# Patient Record
Sex: FEMALE | Race: WHITE | NOT HISPANIC OR LATINO | Employment: OTHER | URBAN - METROPOLITAN AREA
[De-identification: names, ages, dates, MRNs, and addresses within clinical notes are randomized per-mention and may not be internally consistent; named-entity substitution may affect disease eponyms.]

---

## 2017-01-23 RX ORDER — GLIMEPIRIDE 4 MG/1
TABLET ORAL
Qty: 90 TABLET | Refills: 0 | Status: SHIPPED | OUTPATIENT
Start: 2017-01-23 | End: 2017-04-24 | Stop reason: SDUPTHER

## 2017-01-23 RX ORDER — POTASSIUM CHLORIDE 750 MG/1
TABLET, FILM COATED, EXTENDED RELEASE ORAL
Qty: 90 TABLET | Refills: 0 | Status: SHIPPED | OUTPATIENT
Start: 2017-01-23 | End: 2018-01-16 | Stop reason: SDUPTHER

## 2017-02-07 ENCOUNTER — OFFICE VISIT (OUTPATIENT)
Dept: ENDOCRINOLOGY | Facility: CLINIC | Age: 72
End: 2017-02-07

## 2017-02-07 VITALS
BODY MASS INDEX: 29.25 KG/M2 | OXYGEN SATURATION: 98 % | WEIGHT: 182 LBS | HEART RATE: 67 BPM | SYSTOLIC BLOOD PRESSURE: 124 MMHG | DIASTOLIC BLOOD PRESSURE: 68 MMHG | HEIGHT: 66 IN

## 2017-02-07 DIAGNOSIS — E78.00 PURE HYPERCHOLESTEROLEMIA: ICD-10-CM

## 2017-02-07 DIAGNOSIS — Z79.4 TYPE 2 DIABETES MELLITUS WITHOUT COMPLICATION, WITH LONG-TERM CURRENT USE OF INSULIN (HCC): Primary | ICD-10-CM

## 2017-02-07 DIAGNOSIS — R79.89 ABNORMAL LIVER FUNCTION TESTS: ICD-10-CM

## 2017-02-07 DIAGNOSIS — E55.9 VITAMIN D DEFICIENCY: ICD-10-CM

## 2017-02-07 DIAGNOSIS — E11.9 TYPE 2 DIABETES MELLITUS WITHOUT COMPLICATION, WITH LONG-TERM CURRENT USE OF INSULIN (HCC): Primary | ICD-10-CM

## 2017-02-07 DIAGNOSIS — I10 BENIGN ESSENTIAL HYPERTENSION: ICD-10-CM

## 2017-02-07 LAB
ALBUMIN SERPL-MCNC: 4.7 G/DL (ref 3.2–4.8)
ALBUMIN/GLOB SERPL: 1.9 G/DL (ref 1.5–2.5)
ALP SERPL-CCNC: 117 U/L (ref 25–100)
ALT SERPL W P-5'-P-CCNC: 31 U/L (ref 7–40)
ANION GAP SERPL CALCULATED.3IONS-SCNC: 7 MMOL/L (ref 3–11)
ARTICHOKE IGE QN: 56 MG/DL (ref 0–130)
AST SERPL-CCNC: 29 U/L (ref 0–33)
BILIRUB SERPL-MCNC: 0.5 MG/DL (ref 0.3–1.2)
BUN BLD-MCNC: 25 MG/DL (ref 9–23)
BUN/CREAT SERPL: 20.8 (ref 7–25)
CALCIUM SPEC-SCNC: 10.8 MG/DL (ref 8.7–10.4)
CHLORIDE SERPL-SCNC: 105 MMOL/L (ref 99–109)
CHOLEST SERPL-MCNC: 130 MG/DL (ref 0–200)
CO2 SERPL-SCNC: 31 MMOL/L (ref 20–31)
CREAT BLD-MCNC: 1.2 MG/DL (ref 0.6–1.3)
EXPIRATION DATE: NORMAL
GFR SERPL CREATININE-BSD FRML MDRD: 44 ML/MIN/1.73
GLOBULIN UR ELPH-MCNC: 2.5 GM/DL
GLUCOSE BLD-MCNC: 54 MG/DL (ref 70–100)
GLUCOSE BLDC GLUCOMTR-MCNC: 99 MG/DL (ref 70–130)
HBA1C MFR BLD: 7.4 %
HDLC SERPL-MCNC: 48 MG/DL (ref 40–60)
Lab: NORMAL
POTASSIUM BLD-SCNC: 4.6 MMOL/L (ref 3.5–5.5)
PROT SERPL-MCNC: 7.2 G/DL (ref 5.7–8.2)
SODIUM BLD-SCNC: 143 MMOL/L (ref 132–146)
T4 FREE SERPL-MCNC: 1.23 NG/DL (ref 0.89–1.76)
TRIGL SERPL-MCNC: 112 MG/DL (ref 0–150)
TSH SERPL DL<=0.05 MIU/L-ACNC: 1.66 MIU/ML (ref 0.35–5.35)

## 2017-02-07 PROCEDURE — 84443 ASSAY THYROID STIM HORMONE: CPT | Performed by: INTERNAL MEDICINE

## 2017-02-07 PROCEDURE — 80053 COMPREHEN METABOLIC PANEL: CPT | Performed by: INTERNAL MEDICINE

## 2017-02-07 PROCEDURE — 99214 OFFICE O/P EST MOD 30 MIN: CPT | Performed by: INTERNAL MEDICINE

## 2017-02-07 PROCEDURE — 82947 ASSAY GLUCOSE BLOOD QUANT: CPT | Performed by: INTERNAL MEDICINE

## 2017-02-07 PROCEDURE — 83036 HEMOGLOBIN GLYCOSYLATED A1C: CPT | Performed by: INTERNAL MEDICINE

## 2017-02-07 PROCEDURE — 80061 LIPID PANEL: CPT | Performed by: INTERNAL MEDICINE

## 2017-02-07 PROCEDURE — 84439 ASSAY OF FREE THYROXINE: CPT | Performed by: INTERNAL MEDICINE

## 2017-02-07 RX ORDER — ATORVASTATIN CALCIUM 10 MG/1
TABLET, FILM COATED ORAL
Qty: 90 TABLET | Refills: 1 | Status: SHIPPED | OUTPATIENT
Start: 2017-02-07 | End: 2017-07-27 | Stop reason: SDUPTHER

## 2017-02-07 NOTE — ASSESSMENT & PLAN NOTE
Blood sugar and 90 day average sugar discussed  Results for orders placed or performed in visit on 02/07/17   POC Glucose Fingerstick   Result Value Ref Range    Glucose 99 70 - 130 mg/dL   POC Glycated Hemoglobin, Total   Result Value Ref Range    Hemoglobin A1C 7.4 %    Lot Number [id number]     Expiration Date 9/2018      Average sugar 160   Is up to date with eye exam, foot exam   Has neuropathy with aashish- discussed good choice of footwear with kamari soares due   F/u 3-4 months

## 2017-02-07 NOTE — PROGRESS NOTES
"Chinyere Olvera 71 y.o.  [facility]:Follow-up; Diabetes (Type II); Hyperlipidemia; and Hypothyroidism    [place]: Follow-up; Diabetes (Type II); Hyperlipidemia; and Hypothyroidism    Blood sugar and 90 day average sugar reviewed  Results for orders placed or performed in visit on 02/07/17   POC Glucose Fingerstick   Result Value Ref Range    Glucose 99 70 - 130 mg/dL   POC Glycated Hemoglobin, Total   Result Value Ref Range    Hemoglobin A1C 7.4 %    Lot Number [id number]     Expiration Date 9/2018      Average sugar 160   Higher sugars over holidays  She is up to date with eye exam  Neuropathy is stable  Ur alb due  She is working on diet  She has in interim celebrated 50th wedding anniversary  Going to [facility] this spring  She is using vaporub on feet for fungal nail infection    Allergies   Allergen Reactions   • Penicillins        Current Outpatient Prescriptions:   •  amLODIPine (NORVASC) 5 MG tablet, TAKE ONE TABLET BY MOUTH DAILY, Disp: 30 tablet, Rfl: 2  •  aspirin 81 MG tablet, Take  by mouth., Disp: , Rfl:   •  atorvastatin (LIPITOR) 10 MG tablet, TAKE ONE TABLET BY MOUTH DAILY, Disp: 90 tablet, Rfl: 1  •  buPROPion XL (WELLBUTRIN XL) 300 MG 24 hr tablet, Take  by mouth daily., Disp: , Rfl:   •  Ferrous Fumarate (IRON) 18 MG tablet controlled-release, Take  by mouth daily., Disp: , Rfl:   •  glimepiride (AMARYL) 4 MG tablet, TAKE ONE TABLET BY MOUTH DAILY, Disp: 90 tablet, Rfl: 0  •  glucose blood (ONE TOUCH ULTRA TEST) test strip, Test BID, Disp: 100 each, Rfl: 5  •  Insulin Lispro (HUMALOG KWIKPEN) 100 UNIT/ML solution pen-injector, Inject 10 Units under the skin 3 (Three) Times a Day With Meals., Disp: 15 pen, Rfl: 1  •  Insulin Pen Needle (B-D ULTRAFINE III SHORT PEN) 31G X 8 MM misc, Use QID or as directed, Disp: 100 each, Rfl: 5  •  Insulin Syringe-Needle U-100 (BD INSULIN SYRINGE ULTRAFINE) 31G X 5/16\" 0.5 ML misc, , Disp: , Rfl:   •  lansoprazole (PREVACID) 30 MG capsule, Take  by mouth., Disp: , Rfl:   •  LANTUS " SOLOSTAR 100 UNIT/ML injection pen, INJECT 30 UNITS UNDER THE SKIN AT BEDTIME, Disp: 10 pen, Rfl: 5  •  losartan-hydrochlorothiazide (HYZAAR) 100-12.5 MG per tablet, TAKE ONE TABLET BY MOUTH DAILY, Disp: 30 tablet, Rfl: 2  •  metoprolol tartrate (LOPRESSOR) 25 MG tablet, Take 0.25 tablets by mouth 2 (two) times a day., Disp: , Rfl:   •  Omega-3 Fatty Acids (FISH OIL) 1000 MG capsule capsule, Take  by mouth daily., Disp: , Rfl:   •  ONETOUCH DELICA LANCETS 33G misc, , Disp: , Rfl:   •  potassium chloride (K-DUR) 10 MEQ CR tablet, TAKE ONE TABLET BY MOUTH DAILY WITH FOOD, Disp: 90 tablet, Rfl: 0  •  potassium chloride (KLOR-CON) 10 MEQ CR tablet, Take  by mouth daily., Disp: , Rfl:   Patient Active Problem List    Diagnosis   • Obstructive sleep apnea [G47.33]   • Peripheral neuropathy, idiopathic [G60.9]   • Abnormal liver function tests [R79.89]     Overview Note:     Impression: 11/12/2015 - check cmp  Impression: 03/24/2015 - check cmp  Impression: 08/29/2014 - reviewed last lab work - good results  Impression: 05/09/2014 - update; Description: viral- resolved 3/13  normal hepatic u/s 3/13     • Anemia [D64.9]     Overview Note:     Impression: 03/24/2015 - check cbc  Impression: 11/28/2014 - check cbc;      • Benign essential hypertension [I10]     Overview Note:     Impression: 02/26/2016 - bp high- titrate medication  Impression: 11/12/2015 - bp is good  Impression: 06/25/2015 - bp is good  Impression: 03/24/2015 - bp is good  Impression: 11/28/2014 - bp is good  Impression: 08/29/2014 - bp is good   continue to monitor  Impression: 05/09/2014 - bp is good  Impression: 02/05/2014 - Blood pressure is good, continue to monitor and continue current medication.; Description: normal renal u/s, no evidence of renal artery stenosis 3/13     • Diabetes mellitus [E11.9]     Overview Note:     Impression: 02/26/2016 - blood sugar 113, hgn a1c 7.4%   is up to date with eye exam    neuropathy stable - callus better   ur  alb  - titrate losartan  Impression: 11/12/2015 - blood sugar is 136, hgn a1c 6.6%  doing well overall   is up to date with preventive care   bp is good   is stepping up diet and exercise, discussed reducing glimepiride by 1/2  Impression: 06/25/2015 - blood sugar is 151, hgn a1c 6.9%  average sugar is 150  is up to date with eye exam  ur alb 3/15 - on arb  neuropathy and hammertoe with callus stable  f/u 4 months or prn  Impression: 03/24/2015 - blood sugar 147, hgn a1c 7.3% (average 160)  is up to date with eye exam  neuropathy stable- healed blister right gt toe  ur alb due  Impression: 11/28/2014 - blood sugar and hgn a1c both in range- reviewed blood sugar average 6.8% (average 140)  is not haviing significant lows  is up to date with preventive care- commended efforts  will f/u when back from [place]  Impression: 08/29/2014 - blood sugar 207, hgn a1c 6.9%  average 150  is up to date with preventive care  good shoes for foot/risk of callus and ulcer discussed  f/u 3-4 months, no change in medication currently  Impression: 05/09/2014 - blood sugar is 256, hgn a1c 6.7% (good control overall)  is up to date on eye exam, no neuropathy but foot deformity and hammertoe - discussed [employer] for better foot wear  f/u 3-4 months  Impression: 02/05/2014 - II Diabetes Mellitus- blood sugars are better overall.  current blood sugar is 129, hgn a1c 7.4 (up from 6.9).  Is not having low blood sugars, is up to date on eye exam, no neuropathy.  Has nephropathy, bp is good and she is on an ace/arb.  Continue to monitor blood sugars and watch diet, stay active.  Discussed foot care and the need for her to get arch supports due to high arch.;      • Gastroesophageal reflux disease [K21.9]   • Hyperlipidemia [E78.5]     Overview Note:     Impression: 02/26/2016 - good ldl last check  Impression: 11/12/2015 - check flp  Impression: 06/25/2015 - recent flp good  Impression: 03/24/2015 - check flp  Impression:  11/28/2014 - check flp  Impression: 08/29/2014 - reviewed last lab work, update next ov  good results last visit  Impression: 05/09/2014 - check flp  Impression: 02/05/2014 - good lipid control on lipitor. Update next visit.;      • Hypokalemia [E87.6]   • Calculus of kidney [N20.0]   • Cyst of ovary [N83.209]     Overview Note:     Description: calcification 2/13- by u/s 0.92x0.95cm  thought to be calcified cyst     • Renal insufficiency [N28.9]     Overview Note:     Impression: 11/12/2015 - check cmp  Impression: 06/25/2015 - improved function overall  Impression: 03/24/2015 - check cmp  Impression: 11/28/2014 - stable last check- update today  Impression: 08/29/2014 - stable function;      • Solitary pulmonary nodule [R91.1]     Overview Note:     Description: [facility] CAT scan 2/13 stable repeat yearly     • Vitamin D deficiency [E55.9]     Overview Note:     Impression: 11/12/2015 - last check good   f/u yearly  Impression: 06/25/2015 - on supplement  Impression: 03/24/2015 - check vitamin D level  Impression: 11/28/2014 - update levels;      • Abnormal weight loss [R63.4]     Review of Systems   Constitutional: Negative for activity change, appetite change, chills, diaphoresis, fatigue, fever and unexpected weight change.   HENT: Negative for congestion, dental problem, drooling, ear discharge, ear pain, facial swelling, hearing loss, mouth sores, nosebleeds, postnasal drip, rhinorrhea, sinus pressure, sneezing, sore throat, tinnitus, trouble swallowing and voice change.    Eyes: Negative for photophobia, pain, discharge, redness, itching and visual disturbance.   Respiratory: Negative for apnea, cough, choking, chest tightness, shortness of breath, wheezing and stridor.    Cardiovascular: Negative for chest pain, palpitations and leg swelling.   Gastrointestinal: Negative for abdominal distention, abdominal pain, anal bleeding, blood in stool, constipation, diarrhea, nausea, rectal pain and vomiting.  "  Endocrine: Negative for cold intolerance, heat intolerance, polydipsia, polyphagia and polyuria.   Genitourinary: Negative for decreased urine volume, difficulty urinating, dysuria, enuresis, flank pain, frequency, genital sores, hematuria and urgency.   Musculoskeletal: Negative for arthralgias, back pain, gait problem, joint swelling, myalgias, neck pain and neck stiffness.   Skin: Negative for color change, pallor, rash and wound.   Allergic/Immunologic: Negative for environmental allergies, food allergies and immunocompromised state.   Neurological: Negative for dizziness, tremors, seizures, syncope, facial asymmetry, speech difficulty, weakness, light-headedness, numbness and headaches.   Hematological: Negative for adenopathy. Does not bruise/bleed easily.   Psychiatric/Behavioral: Negative for agitation, behavioral problems, confusion, decreased concentration, dysphoric mood, hallucinations, self-injury, sleep disturbance and suicidal ideas. The patient is not nervous/anxious and is not hyperactive.      Social History     Social History   • Marital status:      Spouse name: N/A   • Number of children: N/A   • Years of education: N/A     Occupational History   • Not on file.     Social History Main Topics   • Smoking status: Former Smoker   • Smokeless tobacco: Not on file   • Alcohol use 4.2 oz/week     7 Glasses of wine per week   • Drug use: No   • Sexual activity: Defer     Other Topics Concern   • Not on file     Social History Narrative     Family History   Problem Relation Age of Onset   • Diabetes Father    • Diabetes Maternal Grandfather    • Coronary artery disease Other      Visit Vitals   • /68   • Pulse 67   • Ht 66\" (167.6 cm)   • Wt 182 lb (82.6 kg)   • SpO2 98%   • BMI 29.38 kg/m2     Physical Exam   Constitutional: She is oriented to person, place, and time. She appears well-developed and well-nourished.   HENT:   Head: Normocephalic and atraumatic.   Nose: Nose normal. "   Mouth/Throat: Oropharynx is clear and moist.   Eyes: Conjunctivae, EOM and lids are normal. Pupils are equal, round, and reactive to light.   Neck: Trachea normal and normal range of motion. Neck supple. Carotid bruit is not present. No tracheal deviation present. No thyroid mass and no thyromegaly present.   Cardiovascular: Normal rate, regular rhythm, normal heart sounds and intact distal pulses.  Exam reveals no gallop and no friction rub.    No murmur heard.  Pulmonary/Chest: Effort normal and breath sounds normal. No respiratory distress. She has no wheezes.   Musculoskeletal: Normal range of motion. She exhibits no edema or deformity.   Lymphadenopathy:     She has no cervical adenopathy.   Neurological: She is alert and oriented to person, place, and time. She has normal reflexes. She displays normal reflexes. No cranial nerve deficit.   Skin: Skin is warm and dry. No rash noted. No cyanosis or erythema. Nails show no clubbing.   Psychiatric: She has a normal mood and affect. Her speech is normal and behavior is normal. Judgment and thought content normal. Cognition and memory are normal.   Nursing note and vitals reviewed.    Results for orders placed or performed in visit on 10/03/16   POC Glucose Fingerstick   Result Value Ref Range    Glucose 117 70 - 130 mg/dL   POC Glycosylated Hemoglobin (Hb A1C)   Result Value Ref Range    Hemoglobin A1C 6.9 %     Problem List Items Addressed This Visit        Cardiovascular and Mediastinum    Benign essential hypertension     bp is good          Hyperlipidemia     Check flp          Relevant Orders    TSH    T4, Free    Comprehensive Metabolic Panel    Lipid Panel       Digestive    Vitamin D deficiency     Continue supplement, check level             Endocrine    Diabetes mellitus - Primary     Blood sugar and 90 day average sugar discussed  Results for orders placed or performed in visit on 02/07/17   POC Glucose Fingerstick   Result Value Ref Range    Glucose 99  70 - 130 mg/dL   POC Glycated Hemoglobin, Total   Result Value Ref Range    Hemoglobin A1C 7.4 %    Lot Number [id number]     Expiration Date 9/2018      Average sugar 160   Is up to date with eye exam, foot exam   Has neuropathy with aashish- discussed good choice of footwear with kamari soares due   F/u 3-4 months          Relevant Medications    glucose blood (ONE TOUCH ULTRA TEST) test strip    Other Relevant Orders    POC Glucose Fingerstick (Completed)    POC Glycated Hemoglobin, Total (Completed)    Microalbumin / Creatinine Urine Ratio       Other    Abnormal liver function tests     Update cmp            due for colonoscopy  8/16 mamm  dexa same time- stable  Has had pneumovax and prevnar- not sure where  No chest pain or pressure  Some martinez (feels like she is out of shape )   Return in about 4 months (around 6/7/2017) for Recheck 30 min .      Renetta Rosario MA

## 2017-03-27 RX ORDER — LOSARTAN POTASSIUM AND HYDROCHLOROTHIAZIDE 12.5; 1 MG/1; MG/1
TABLET ORAL
Qty: 30 TABLET | Refills: 1 | Status: SHIPPED | OUTPATIENT
Start: 2017-03-27 | End: 2017-05-26 | Stop reason: SDUPTHER

## 2017-03-27 RX ORDER — AMLODIPINE BESYLATE 5 MG/1
TABLET ORAL
Qty: 30 TABLET | Refills: 1 | Status: SHIPPED | OUTPATIENT
Start: 2017-03-27 | End: 2017-05-26 | Stop reason: SDUPTHER

## 2017-04-21 RX ORDER — PEN NEEDLE, DIABETIC 31 GX5/16"
NEEDLE, DISPOSABLE MISCELLANEOUS
Qty: 200 EACH | Refills: 5 | Status: SHIPPED | OUTPATIENT
Start: 2017-04-21 | End: 2018-05-04 | Stop reason: SDUPTHER

## 2017-04-24 RX ORDER — POTASSIUM CHLORIDE 750 MG/1
TABLET, FILM COATED, EXTENDED RELEASE ORAL
Qty: 90 TABLET | Refills: 0 | Status: SHIPPED | OUTPATIENT
Start: 2017-04-24 | End: 2017-06-08

## 2017-04-24 RX ORDER — GLIMEPIRIDE 4 MG/1
TABLET ORAL
Qty: 90 TABLET | Refills: 0 | Status: SHIPPED | OUTPATIENT
Start: 2017-04-24 | End: 2017-07-27 | Stop reason: SDUPTHER

## 2017-05-26 RX ORDER — AMLODIPINE BESYLATE 5 MG/1
TABLET ORAL
Qty: 30 TABLET | Refills: 3 | Status: SHIPPED | OUTPATIENT
Start: 2017-05-26 | End: 2017-09-29 | Stop reason: SDUPTHER

## 2017-05-26 RX ORDER — LOSARTAN POTASSIUM AND HYDROCHLOROTHIAZIDE 12.5; 1 MG/1; MG/1
TABLET ORAL
Qty: 30 TABLET | Refills: 3 | Status: SHIPPED | OUTPATIENT
Start: 2017-05-26 | End: 2017-10-01 | Stop reason: SDUPTHER

## 2017-06-08 ENCOUNTER — OFFICE VISIT (OUTPATIENT)
Dept: ENDOCRINOLOGY | Facility: CLINIC | Age: 72
End: 2017-06-08

## 2017-06-08 VITALS
BODY MASS INDEX: 27.16 KG/M2 | WEIGHT: 169 LBS | OXYGEN SATURATION: 98 % | DIASTOLIC BLOOD PRESSURE: 60 MMHG | SYSTOLIC BLOOD PRESSURE: 118 MMHG | HEIGHT: 66 IN | HEART RATE: 60 BPM

## 2017-06-08 DIAGNOSIS — G60.9 PERIPHERAL NEUROPATHY, IDIOPATHIC: ICD-10-CM

## 2017-06-08 DIAGNOSIS — I10 BENIGN ESSENTIAL HYPERTENSION: ICD-10-CM

## 2017-06-08 DIAGNOSIS — E11.9 TYPE 2 DIABETES MELLITUS WITHOUT COMPLICATION, UNSPECIFIED LONG TERM INSULIN USE STATUS: Primary | ICD-10-CM

## 2017-06-08 DIAGNOSIS — E78.00 PURE HYPERCHOLESTEROLEMIA: ICD-10-CM

## 2017-06-08 LAB
GLUCOSE BLDC GLUCOMTR-MCNC: 139 MG/DL (ref 70–130)
HBA1C MFR BLD: 6.9 %

## 2017-06-08 PROCEDURE — 82947 ASSAY GLUCOSE BLOOD QUANT: CPT | Performed by: INTERNAL MEDICINE

## 2017-06-08 PROCEDURE — 83036 HEMOGLOBIN GLYCOSYLATED A1C: CPT | Performed by: INTERNAL MEDICINE

## 2017-06-08 PROCEDURE — 99214 OFFICE O/P EST MOD 30 MIN: CPT | Performed by: INTERNAL MEDICINE

## 2017-06-08 RX ORDER — POTASSIUM CHLORIDE 750 MG/1
TABLET, EXTENDED RELEASE ORAL
COMMUNITY
Start: 2017-04-24 | End: 2018-01-16 | Stop reason: SDUPTHER

## 2017-06-08 RX ORDER — BLOOD-GLUCOSE METER
EACH MISCELLANEOUS
Qty: 1 EACH | Refills: 0 | Status: SHIPPED | OUTPATIENT
Start: 2017-06-08 | End: 2019-06-18 | Stop reason: CLARIF

## 2017-06-08 NOTE — PROGRESS NOTES
Chinyere Olvera 72 y.o.  CC:Follow-up; Diabetes (Type II, last eye exam was March 2017, by Dr Fink, also sees Dr Cruz); Hyperlipidemia; and Hypothyroidism (weight loss of 13 lbs, intentional)    [place]: Follow-up; Diabetes (Type II, last eye exam was March 2017, by Dr Fink, also sees Dr Cruz); Hyperlipidemia; and Hypothyroidism (weight loss of 13 lbs, intentional)    Blood sugar and 90 day average sugar reviewed  Results for orders placed or performed in visit on 06/08/17   POC Glycosylated Hemoglobin (Hb A1C)   Result Value Ref Range    Hemoglobin A1C 6.9 %   POC Glucose Fingerstick   Result Value Ref Range    Glucose 139 (A) 70 - 130 mg/dL       She is up to date with eye exam  Neuropathy stable with foot deformity/ hammer toes   Due ur alb next visit  Reviewed recent lab work with her - ldl and kidney function, thyroid function normal  Goal to lose 25 lbs    Allergies   Allergen Reactions   • Penicillins        Current Outpatient Prescriptions:   •  amLODIPine (NORVASC) 5 MG tablet, TAKE ONE TABLET BY MOUTH DAILY, Disp: 30 tablet, Rfl: 3  •  aspirin 81 MG tablet, Take  by mouth., Disp: , Rfl:   •  atorvastatin (LIPITOR) 10 MG tablet, TAKE ONE TABLET BY MOUTH DAILY, Disp: 90 tablet, Rfl: 1  •  buPROPion XL (WELLBUTRIN XL) 300 MG 24 hr tablet, Take  by mouth daily., Disp: , Rfl:   •  Ferrous Fumarate (IRON) 18 MG tablet controlled-release, Take  by mouth daily., Disp: , Rfl:   •  glimepiride (AMARYL) 4 MG tablet, TAKE ONE TABLET BY MOUTH DAILY, Disp: 90 tablet, Rfl: 0  •  losartan-hydrochlorothiazide (HYZAAR) 100-12.5 MG per tablet, TAKE ONE TABLET BY MOUTH DAILY, Disp: 30 tablet, Rfl: 3  •  metoprolol tartrate (LOPRESSOR) 25 MG tablet, Take 0.25 tablets by mouth 2 (two) times a day., Disp: , Rfl:   •  Omega-3 Fatty Acids (FISH OIL) 1000 MG capsule capsule, Take  by mouth daily., Disp: , Rfl:   •  potassium chloride (K-DUR) 10 MEQ CR tablet, TAKE ONE TABLET BY MOUTH DAILY WITH FOOD, Disp: 90 tablet, Rfl:  "0  •  B-D ULTRAFINE III SHORT PEN 31G X 8 MM misc, USE FOUR TIMES A DAY OR AS DIRECTED, Disp: 200 each, Rfl: 5  •  glucose blood (ONE TOUCH ULTRA TEST) test strip, Test TID DX:  E11.9, Disp: 100 each, Rfl: 11  •  Insulin Lispro (HUMALOG KWIKPEN) 100 UNIT/ML solution pen-injector, Inject 10 Units under the skin 3 (Three) Times a Day With Meals. (Patient taking differently: Inject  under the skin 3 (Three) Times a Day With Meals. Inject 8 TID with meals), Disp: 15 pen, Rfl: 1  •  Insulin Syringe-Needle U-100 (BD INSULIN SYRINGE ULTRAFINE) 31G X 5/16\" 0.5 ML misc, , Disp: , Rfl:   •  lansoprazole (PREVACID) 30 MG capsule, Take  by mouth., Disp: , Rfl:   •  LANTUS SOLOSTAR 100 UNIT/ML injection pen, INJECT 30 UNITS UNDER THE SKIN AT BEDTIME (Patient taking differently: Injec 28 UNITS UNDER THE SKIN AT BEDTIME), Disp: 10 pen, Rfl: 5  •  ONETOUCH DELICA LANCETS 33G misc, , Disp: , Rfl:   Patient Active Problem List    Diagnosis   • Obstructive sleep apnea [G47.33]   • Peripheral neuropathy, idiopathic [G60.9]   • Abnormal liver function tests [R79.89]     Overview Note:     Impression: 11/12/2015 - check cmp  Impression: 03/24/2015 - check cmp  Impression: 08/29/2014 - reviewed last lab work - good results  Impression: 05/09/2014 - update; Description: viral- resolved 3/13  normal hepatic u/s 3/13     • Anemia [D64.9]     Overview Note:     Impression: 03/24/2015 - check cbc  Impression: 11/28/2014 - check cbc;      • Benign essential hypertension [I10]     Overview Note:     Impression: 02/26/2016 - bp high- titrate medication  Impression: 11/12/2015 - bp is good  Impression: 06/25/2015 - bp is good  Impression: 03/24/2015 - bp is good  Impression: 11/28/2014 - bp is good  Impression: 08/29/2014 - bp is good   continue to monitor  Impression: 05/09/2014 - bp is good  Impression: 02/05/2014 - Blood pressure is good, continue to monitor and continue current medication.; Description: normal renal u/s, no evidence of renal " artery stenosis 3/13     • Diabetes mellitus [E11.9]     Overview Note:     Impression: 02/26/2016 - blood sugar 113, hgn a1c 7.4%   is up to date with eye exam    neuropathy stable - callus better   ur alb  - titrate losartan  Impression: 11/12/2015 - blood sugar is 136, hgn a1c 6.6%  doing well overall   is up to date with preventive care   bp is good   is stepping up diet and exercise, discussed reducing glimepiride by 1/2  Impression: 06/25/2015 - blood sugar is 151, hgn a1c 6.9%  average sugar is 150  is up to date with eye exam  ur alb 3/15 - on arb  neuropathy and hammertoe with callus stable  f/u 4 months or prn  Impression: 03/24/2015 - blood sugar 147, hgn a1c 7.3% (average 160)  is up to date with eye exam  neuropathy stable- healed blister right gt toe  ur alb due  Impression: 11/28/2014 - blood sugar and hgn a1c both in range- reviewed blood sugar average 6.8% (average 140)  is not haviing significant lows  is up to date with preventive care- commended efforts  will f/u when back from [place]  Impression: 08/29/2014 - blood sugar 207, hgn a1c 6.9%  average 150  is up to date with preventive care  good shoes for foot/risk of callus and ulcer discussed  f/u 3-4 months, no change in medication currently  Impression: 05/09/2014 - blood sugar is 256, hgn a1c 6.7% (good control overall)  is up to date on eye exam, no neuropathy but foot deformity and hammertoe - discussed [employer] for better foot wear  f/u 3-4 months  Impression: 02/05/2014 - II Diabetes Mellitus- blood sugars are better overall.  current blood sugar is 129, hgn a1c 7.4 (up from 6.9).  Is not having low blood sugars, is up to date on eye exam, no neuropathy.  Has nephropathy, bp is good and she is on an ace/arb.  Continue to monitor blood sugars and watch diet, stay active.  Discussed foot care and the need for her to get arch supports due to high arch.;      • Gastroesophageal reflux disease [K21.9]   • Hyperlipidemia  [E78.5]     Overview Note:     Impression: 02/26/2016 - good ldl last check  Impression: 11/12/2015 - check flp  Impression: 06/25/2015 - recent flp good  Impression: 03/24/2015 - check flp  Impression: 11/28/2014 - check flp  Impression: 08/29/2014 - reviewed last lab work, update next ov  good results last visit  Impression: 05/09/2014 - check flp  Impression: 02/05/2014 - good lipid control on lipitor. Update next visit.;      • Hypokalemia [E87.6]   • Calculus of kidney [N20.0]   • Cyst of ovary [N83.209]     Overview Note:     Description: calcification 2/13- by u/s 0.92x0.95cm  thought to be calcified cyst     • Renal insufficiency [N28.9]     Overview Note:     Impression: 11/12/2015 - check cmp  Impression: 06/25/2015 - improved function overall  Impression: 03/24/2015 - check cmp  Impression: 11/28/2014 - stable last check- update today  Impression: 08/29/2014 - stable function;      • Solitary pulmonary nodule [R91.1]     Overview Note:     Description: [facility] CAT scan 2/13 stable repeat yearly     • Vitamin D deficiency [E55.9]     Overview Note:     Impression: 11/12/2015 - last check good   f/u yearly  Impression: 06/25/2015 - on supplement  Impression: 03/24/2015 - check vitamin D level  Impression: 11/28/2014 - update levels;      • Abnormal weight loss [R63.4]     Review of Systems   Constitutional: Negative for activity change, appetite change, chills, diaphoresis, fatigue, fever and unexpected weight change.   HENT: Negative for congestion, dental problem, drooling, ear discharge, ear pain, facial swelling, hearing loss, mouth sores, nosebleeds, postnasal drip, rhinorrhea, sinus pressure, sneezing, sore throat, tinnitus, trouble swallowing and voice change.    Eyes: Negative for photophobia, pain, discharge, redness, itching and visual disturbance.   Respiratory: Negative for apnea, cough, choking, chest tightness, shortness of breath, wheezing and stridor.    Cardiovascular: Negative for  "chest pain, palpitations and leg swelling.   Gastrointestinal: Negative for abdominal distention, abdominal pain, anal bleeding, blood in stool, constipation, diarrhea, nausea, rectal pain and vomiting.   Endocrine: Negative for cold intolerance, heat intolerance, polydipsia, polyphagia and polyuria.   Genitourinary: Negative for decreased urine volume, difficulty urinating, dysuria, enuresis, flank pain, frequency, genital sores, hematuria and urgency.   Musculoskeletal: Negative for arthralgias, back pain, gait problem, joint swelling, myalgias, neck pain and neck stiffness.   Skin: Negative for color change, pallor, rash and wound.   Allergic/Immunologic: Negative for environmental allergies, food allergies and immunocompromised state.   Neurological: Negative for dizziness, tremors, seizures, syncope, facial asymmetry, speech difficulty, weakness, light-headedness, numbness and headaches.   Hematological: Negative for adenopathy. Does not bruise/bleed easily.   Psychiatric/Behavioral: Negative for agitation, behavioral problems, confusion, decreased concentration, dysphoric mood, hallucinations, self-injury, sleep disturbance and suicidal ideas. The patient is not nervous/anxious and is not hyperactive.      Social History     Social History   • Marital status:      Spouse name: N/A   • Number of children: N/A   • Years of education: N/A     Occupational History   • Not on file.     Social History Main Topics   • Smoking status: Former Smoker   • Smokeless tobacco: Not on file   • Alcohol use 4.2 oz/week     7 Glasses of wine per week   • Drug use: No   • Sexual activity: Defer     Other Topics Concern   • Not on file     Social History Narrative     Family History   Problem Relation Age of Onset   • Diabetes Father    • Diabetes Maternal Grandfather    • Coronary artery disease Other      /60  Pulse 60  Ht 66\" (167.6 cm)  Wt 169 lb (76.7 kg)  SpO2 98%  BMI 27.28 kg/m2  Physical Exam "   Constitutional: She is oriented to person, place, and time. She appears well-developed and well-nourished.   HENT:   Head: Normocephalic and atraumatic.   Nose: Nose normal.   Mouth/Throat: Oropharynx is clear and moist.   Eyes: Conjunctivae, EOM and lids are normal. Pupils are equal, round, and reactive to light.   Neck: Trachea normal and normal range of motion. Neck supple. Carotid bruit is not present. No tracheal deviation present. No thyroid mass and no thyromegaly present.   Cardiovascular: Normal rate, regular rhythm, normal heart sounds and intact distal pulses.  Exam reveals no gallop and no friction rub.    No murmur heard.  Pulmonary/Chest: Effort normal and breath sounds normal. No respiratory distress. She has no wheezes.   Musculoskeletal: Normal range of motion. She exhibits no edema or deformity.   Bilateral hammertoes        Neurological Sensory Findings - Altered hot/cold right ankle/foot discrimination and altered hot/cold left ankle/foot discrimination. Altered sharp/dull right ankle/foot discrimination and altered sharp/dull left ankle/foot discrimination. Right ankle/foot altered proprioception and left ankle/foot altered proprioception.    Vascular Status -  Her exam exhibits right foot vasculature normal. Her exam exhibits no right foot edema. Her exam exhibits left foot vasculature normal. Her exam exhibits no left foot edema.   Skin Integrity  -  Her right foot skin is intact.     Chinyere 's left foot skin is intact. .  Lymphadenopathy:     She has no cervical adenopathy.   Neurological: She is alert and oriented to person, place, and time. She has normal reflexes. She displays normal reflexes. No cranial nerve deficit.   Skin: Skin is warm and dry. No rash noted. No cyanosis or erythema. Nails show no clubbing.   Psychiatric: She has a normal mood and affect. Her speech is normal and behavior is normal. Judgment and thought content normal. Cognition and memory are normal.   Nursing note and  vitals reviewed.    Results for orders placed or performed in visit on 02/07/17   TSH   Result Value Ref Range    TSH 1.657 0.350 - 5.350 mIU/mL   T4, Free   Result Value Ref Range    Free T4 1.23 0.89 - 1.76 ng/dL   Comprehensive Metabolic Panel   Result Value Ref Range    Glucose 54 (L) 70 - 100 mg/dL    BUN 25 (H) 9 - 23 mg/dL    Creatinine 1.20 0.60 - 1.30 mg/dL    Sodium 143 132 - 146 mmol/L    Potassium 4.6 3.5 - 5.5 mmol/L    Chloride 105 99 - 109 mmol/L    CO2 31.0 20.0 - 31.0 mmol/L    Calcium 10.8 (H) 8.7 - 10.4 mg/dL    Total Protein 7.2 5.7 - 8.2 g/dL    Albumin 4.70 3.20 - 4.80 g/dL    ALT (SGPT) 31 7 - 40 U/L    AST (SGOT) 29 0 - 33 U/L    Alkaline Phosphatase 117 (H) 25 - 100 U/L    Total Bilirubin 0.5 0.3 - 1.2 mg/dL    eGFR Non African Amer 44 (L) >60 mL/min/1.73    Globulin 2.5 gm/dL    A/G Ratio 1.9 1.5 - 2.5 g/dL    BUN/Creatinine Ratio 20.8 7.0 - 25.0    Anion Gap 7.0 3.0 - 11.0 mmol/L   Lipid Panel   Result Value Ref Range    Total Cholesterol 130 0 - 200 mg/dL    Triglycerides 112 0 - 150 mg/dL    HDL Cholesterol 48 40 - 60 mg/dL    LDL Cholesterol  56 0 - 130 mg/dL   POC Glucose Fingerstick   Result Value Ref Range    Glucose 99 70 - 130 mg/dL   POC Glycated Hemoglobin, Total   Result Value Ref Range    Hemoglobin A1C 7.4 %    Lot Number [id number]     Expiration Date 9/2018      Chinyere was seen today for follow-up, diabetes, hyperlipidemia and hypothyroidism.    Diagnoses and all orders for this visit:    Type 2 diabetes mellitus without complication, unspecified long term insulin use status  -     POC Glycosylated Hemoglobin (Hb A1C)  -     POC Glucose Fingerstick      Problem List Items Addressed This Visit        Cardiovascular and Mediastinum    Benign essential hypertension     bp is good- on arb          Hyperlipidemia     Ldl is normal             Endocrine    Diabetes mellitus - Primary     Blood sugar and 90 day average sugar reviewed  Results for orders placed or performed in visit on  06/08/17   POC Glycosylated Hemoglobin (Hb A1C)   Result Value Ref Range    Hemoglobin A1C 6.9 %   POC Glucose Fingerstick   Result Value Ref Range    Glucose 139 (A) 70 - 130 mg/dL     Doing well overall- no changes recommended  She has lost 13 lbs   Commended weight loss, current efforts   Is up to date with preventive care   Ur alb due next ov          Relevant Orders    POC Glycosylated Hemoglobin (Hb A1C) (Completed)    POC Glucose Fingerstick (Completed)       Nervous and Auditory    Peripheral neuropathy, idiopathic     No foot lesion              Return in about 3 months (around 9/8/2017) for Recheck 30 min .    Renetta Rosario MA  Signed Maria Eugenia Lawrence MD

## 2017-06-08 NOTE — ASSESSMENT & PLAN NOTE
Blood sugar and 90 day average sugar reviewed  Results for orders placed or performed in visit on 06/08/17   POC Glycosylated Hemoglobin (Hb A1C)   Result Value Ref Range    Hemoglobin A1C 6.9 %   POC Glucose Fingerstick   Result Value Ref Range    Glucose 139 (A) 70 - 130 mg/dL     Doing well overall- no changes recommended  She has lost 13 lbs   Commended weight loss, current efforts   Is up to date with preventive care   Ur alb due next ov

## 2017-07-24 RX ORDER — INSULIN GLARGINE 100 [IU]/ML
INJECTION, SOLUTION SUBCUTANEOUS
Qty: 15 ML | Refills: 4 | Status: SHIPPED | OUTPATIENT
Start: 2017-07-24 | End: 2018-04-27 | Stop reason: SDUPTHER

## 2017-07-28 RX ORDER — POTASSIUM CHLORIDE 750 MG/1
TABLET, EXTENDED RELEASE ORAL
Qty: 90 TABLET | Refills: 0 | Status: SHIPPED | OUTPATIENT
Start: 2017-07-28 | End: 2018-01-16 | Stop reason: SDUPTHER

## 2017-07-28 RX ORDER — GLIMEPIRIDE 4 MG/1
TABLET ORAL
Qty: 90 TABLET | Refills: 0 | Status: SHIPPED | OUTPATIENT
Start: 2017-07-28 | End: 2017-10-29 | Stop reason: SDUPTHER

## 2017-07-28 RX ORDER — ATORVASTATIN CALCIUM 10 MG/1
TABLET, FILM COATED ORAL
Qty: 90 TABLET | Refills: 0 | Status: SHIPPED | OUTPATIENT
Start: 2017-07-28 | End: 2017-11-14 | Stop reason: SDUPTHER

## 2017-08-01 ENCOUNTER — TELEPHONE (OUTPATIENT)
Dept: INTERNAL MEDICINE | Facility: CLINIC | Age: 72
End: 2017-08-01

## 2017-08-22 RX ORDER — INSULIN LISPRO 100 [IU]/ML
INJECTION, SOLUTION INTRAVENOUS; SUBCUTANEOUS
Qty: 1 PEN | Refills: 4 | Status: SHIPPED | OUTPATIENT
Start: 2017-08-22 | End: 2018-06-15 | Stop reason: SDUPTHER

## 2017-09-29 RX ORDER — AMLODIPINE BESYLATE 5 MG/1
TABLET ORAL
Qty: 30 TABLET | Refills: 2 | Status: SHIPPED | OUTPATIENT
Start: 2017-09-29 | End: 2018-01-01 | Stop reason: SDUPTHER

## 2017-10-01 RX ORDER — LOSARTAN POTASSIUM AND HYDROCHLOROTHIAZIDE 12.5; 1 MG/1; MG/1
TABLET ORAL
Qty: 30 TABLET | Refills: 2 | Status: SHIPPED | OUTPATIENT
Start: 2017-10-01 | End: 2018-01-01 | Stop reason: SDUPTHER

## 2017-10-02 ENCOUNTER — OFFICE VISIT (OUTPATIENT)
Dept: ENDOCRINOLOGY | Facility: CLINIC | Age: 72
End: 2017-10-02

## 2017-10-02 VITALS
DIASTOLIC BLOOD PRESSURE: 70 MMHG | OXYGEN SATURATION: 99 % | WEIGHT: 173 LBS | SYSTOLIC BLOOD PRESSURE: 124 MMHG | BODY MASS INDEX: 30.65 KG/M2 | HEART RATE: 74 BPM | HEIGHT: 63 IN

## 2017-10-02 DIAGNOSIS — G60.9 PERIPHERAL NEUROPATHY, IDIOPATHIC: ICD-10-CM

## 2017-10-02 DIAGNOSIS — E11.9 TYPE 2 DIABETES MELLITUS WITHOUT COMPLICATION, UNSPECIFIED LONG TERM INSULIN USE STATUS: Primary | ICD-10-CM

## 2017-10-02 DIAGNOSIS — E55.9 VITAMIN D DEFICIENCY: ICD-10-CM

## 2017-10-02 DIAGNOSIS — I10 BENIGN ESSENTIAL HYPERTENSION: ICD-10-CM

## 2017-10-02 DIAGNOSIS — R79.89 ABNORMAL LIVER FUNCTION TESTS: ICD-10-CM

## 2017-10-02 DIAGNOSIS — D64.9 ANEMIA, UNSPECIFIED TYPE: ICD-10-CM

## 2017-10-02 DIAGNOSIS — E78.00 PURE HYPERCHOLESTEROLEMIA: ICD-10-CM

## 2017-10-02 DIAGNOSIS — N28.9 RENAL INSUFFICIENCY: ICD-10-CM

## 2017-10-02 LAB
ALBUMIN SERPL-MCNC: 4.8 G/DL (ref 3.2–4.8)
ALBUMIN/GLOB SERPL: 2 G/DL (ref 1.5–2.5)
ALP SERPL-CCNC: 110 U/L (ref 25–100)
ALT SERPL W P-5'-P-CCNC: 36 U/L (ref 7–40)
ANION GAP SERPL CALCULATED.3IONS-SCNC: 5 MMOL/L (ref 3–11)
ARTICHOKE IGE QN: 79 MG/DL (ref 0–130)
AST SERPL-CCNC: 28 U/L (ref 0–33)
BASOPHILS # BLD AUTO: 0.03 10*3/MM3 (ref 0–0.2)
BASOPHILS NFR BLD AUTO: 0.4 % (ref 0–1)
BILIRUB SERPL-MCNC: 0.7 MG/DL (ref 0.3–1.2)
BUN BLD-MCNC: 24 MG/DL (ref 9–23)
BUN/CREAT SERPL: 21.8 (ref 7–25)
CALCIUM SPEC-SCNC: 10.4 MG/DL (ref 8.7–10.4)
CHLORIDE SERPL-SCNC: 108 MMOL/L (ref 99–109)
CHOLEST SERPL-MCNC: 145 MG/DL (ref 0–200)
CO2 SERPL-SCNC: 31 MMOL/L (ref 20–31)
CREAT BLD-MCNC: 1.1 MG/DL (ref 0.6–1.3)
DEPRECATED RDW RBC AUTO: 43.1 FL (ref 37–54)
EOSINOPHIL # BLD AUTO: 0.07 10*3/MM3 (ref 0–0.3)
EOSINOPHIL NFR BLD AUTO: 1 % (ref 0–3)
ERYTHROCYTE [DISTWIDTH] IN BLOOD BY AUTOMATED COUNT: 12.7 % (ref 11.3–14.5)
GFR SERPL CREATININE-BSD FRML MDRD: 49 ML/MIN/1.73
GLOBULIN UR ELPH-MCNC: 2.4 GM/DL
GLUCOSE BLD-MCNC: 81 MG/DL (ref 70–100)
GLUCOSE BLDC GLUCOMTR-MCNC: 139 MG/DL (ref 70–130)
HBA1C MFR BLD: 7.5 %
HCT VFR BLD AUTO: 39.8 % (ref 34.5–44)
HCV AB SER DONR QL: NORMAL
HDLC SERPL-MCNC: 55 MG/DL (ref 40–60)
HGB BLD-MCNC: 13.3 G/DL (ref 11.5–15.5)
IMM GRANULOCYTES # BLD: 0.01 10*3/MM3 (ref 0–0.03)
IMM GRANULOCYTES NFR BLD: 0.1 % (ref 0–0.6)
LYMPHOCYTES # BLD AUTO: 1.69 10*3/MM3 (ref 0.6–4.8)
LYMPHOCYTES NFR BLD AUTO: 24 % (ref 24–44)
MCH RBC QN AUTO: 30.8 PG (ref 27–31)
MCHC RBC AUTO-ENTMCNC: 33.4 G/DL (ref 32–36)
MCV RBC AUTO: 92.1 FL (ref 80–99)
MONOCYTES # BLD AUTO: 0.44 10*3/MM3 (ref 0–1)
MONOCYTES NFR BLD AUTO: 6.2 % (ref 0–12)
NEUTROPHILS # BLD AUTO: 4.81 10*3/MM3 (ref 1.5–8.3)
NEUTROPHILS NFR BLD AUTO: 68.3 % (ref 41–71)
PLATELET # BLD AUTO: 239 10*3/MM3 (ref 150–450)
PMV BLD AUTO: 10.6 FL (ref 6–12)
POTASSIUM BLD-SCNC: 4.7 MMOL/L (ref 3.5–5.5)
PROT SERPL-MCNC: 7.2 G/DL (ref 5.7–8.2)
RBC # BLD AUTO: 4.32 10*6/MM3 (ref 3.89–5.14)
SODIUM BLD-SCNC: 144 MMOL/L (ref 132–146)
T4 FREE SERPL-MCNC: 1.22 NG/DL (ref 0.89–1.76)
TRIGL SERPL-MCNC: 85 MG/DL (ref 0–150)
TSH SERPL DL<=0.05 MIU/L-ACNC: 1.54 MIU/ML (ref 0.35–5.35)
WBC NRBC COR # BLD: 7.05 10*3/MM3 (ref 3.5–10.8)

## 2017-10-02 PROCEDURE — 99214 OFFICE O/P EST MOD 30 MIN: CPT | Performed by: INTERNAL MEDICINE

## 2017-10-02 PROCEDURE — 83036 HEMOGLOBIN GLYCOSYLATED A1C: CPT | Performed by: INTERNAL MEDICINE

## 2017-10-02 PROCEDURE — 90662 IIV NO PRSV INCREASED AG IM: CPT | Performed by: INTERNAL MEDICINE

## 2017-10-02 PROCEDURE — 85025 COMPLETE CBC W/AUTO DIFF WBC: CPT | Performed by: INTERNAL MEDICINE

## 2017-10-02 PROCEDURE — 84439 ASSAY OF FREE THYROXINE: CPT | Performed by: INTERNAL MEDICINE

## 2017-10-02 PROCEDURE — 82570 ASSAY OF URINE CREATININE: CPT | Performed by: INTERNAL MEDICINE

## 2017-10-02 PROCEDURE — 82043 UR ALBUMIN QUANTITATIVE: CPT | Performed by: INTERNAL MEDICINE

## 2017-10-02 PROCEDURE — 80053 COMPREHEN METABOLIC PANEL: CPT | Performed by: INTERNAL MEDICINE

## 2017-10-02 PROCEDURE — G0008 ADMIN INFLUENZA VIRUS VAC: HCPCS | Performed by: INTERNAL MEDICINE

## 2017-10-02 PROCEDURE — 86803 HEPATITIS C AB TEST: CPT | Performed by: INTERNAL MEDICINE

## 2017-10-02 PROCEDURE — 84443 ASSAY THYROID STIM HORMONE: CPT | Performed by: INTERNAL MEDICINE

## 2017-10-02 PROCEDURE — 82947 ASSAY GLUCOSE BLOOD QUANT: CPT | Performed by: INTERNAL MEDICINE

## 2017-10-02 PROCEDURE — 80061 LIPID PANEL: CPT | Performed by: INTERNAL MEDICINE

## 2017-10-02 NOTE — PROGRESS NOTES
Chinyere Olvera 72 y.o.  CC:Follow-up; Diabetes (TYpe II, last eye exam was March 2017 by Dr Fink); Hyperlipidemia; and Hypothyroidism    [place]: Follow-up; Diabetes (TYpe II, last eye exam was March 2017 by Dr Fink); Hyperlipidemia; and Hypothyroidism    Blood sugar and 90 day average sugar reviewed  Results for orders placed or performed in visit on 10/02/17   POC Glycosylated Hemoglobin (Hb A1C)   Result Value Ref Range    Hemoglobin A1C 7.5 %   POC Glucose Fingerstick   Result Value Ref Range    Glucose 139 (A) 70 - 130 mg/dL     Average sugar is 165  Is following a low fat diet   utd eye exam- 3/17 Dr Fink  Energy is good overall  Ur alb and hep c due today    ran over toes with roller chair  Has been on vacation for 3 weeks    Allergies   Allergen Reactions   • Penicillins        Current Outpatient Prescriptions:   •  amLODIPine (NORVASC) 5 MG tablet, TAKE ONE TABLET BY MOUTH DAILY, Disp: 30 tablet, Rfl: 2  •  aspirin 81 MG tablet, Take  by mouth., Disp: , Rfl:   •  atorvastatin (LIPITOR) 10 MG tablet, TAKE ONE TABLET BY MOUTH DAILY, Disp: 90 tablet, Rfl: 0  •  B-D ULTRAFINE III SHORT PEN 31G X 8 MM misc, USE FOUR TIMES A DAY OR AS DIRECTED, Disp: 200 each, Rfl: 5  •  Blood Glucose Monitoring Suppl (ONE TOUCH ULTRA 2) W/DEVICE kit, Use daily to test blood sugars  Dx:  E11.9, Disp: 1 each, Rfl: 0  •  buPROPion XL (WELLBUTRIN XL) 300 MG 24 hr tablet, Take  by mouth daily., Disp: , Rfl:   •  Ferrous Fumarate (IRON) 18 MG tablet controlled-release, Take  by mouth daily., Disp: , Rfl:   •  glimepiride (AMARYL) 4 MG tablet, TAKE ONE TABLET BY MOUTH DAILY, Disp: 90 tablet, Rfl: 0  •  glucose blood (ONE TOUCH ULTRA TEST) test strip, Test TID DX:  E11.9, Disp: 100 each, Rfl: 11  •  HUMALOG KWIKPEN 100 UNIT/ML solution pen-injector, INJECT 10 UNITS UNDER THE SKIN THREE TIMES A DAY WITH MEALS OR AS DIRECTED, Disp: 1 pen, Rfl: 4  •  Insulin Lispro (HUMALOG KWIKPEN) 100 UNIT/ML solution pen-injector, Inject 10  "Units under the skin 3 (Three) Times a Day With Meals. (Patient taking differently: Inject  under the skin 3 (Three) Times a Day With Meals. Inject 8 TID with meals), Disp: 15 pen, Rfl: 1  •  Insulin Syringe-Needle U-100 (BD INSULIN SYRINGE ULTRAFINE) 31G X 5/16\" 0.5 ML misc, , Disp: , Rfl:   •  KLOR-CON 10 MEQ CR tablet, , Disp: , Rfl:   •  KLOR-CON 10 MEQ CR tablet, TAKE ONE TABLET BY MOUTH DAILY WITH FOOD, Disp: 90 tablet, Rfl: 0  •  lansoprazole (PREVACID) 30 MG capsule, Take  by mouth., Disp: , Rfl:   •  LANTUS SOLOSTAR 100 UNIT/ML injection pen, INJECT 30 UNITS UNDER THE SKIN EVERY NIGHT AT BEDTIME, Disp: 15 mL, Rfl: 4  •  losartan-hydrochlorothiazide (HYZAAR) 100-12.5 MG per tablet, TAKE ONE TABLET BY MOUTH DAILY, Disp: 30 tablet, Rfl: 2  •  metoprolol tartrate (LOPRESSOR) 25 MG tablet, Take 0.25 tablets by mouth 2 (two) times a day., Disp: , Rfl:   •  Omega-3 Fatty Acids (FISH OIL) 1000 MG capsule capsule, Take  by mouth daily., Disp: , Rfl:   •  ONETOUCH DELICA LANCETS 33G misc, , Disp: , Rfl:   •  potassium chloride (K-DUR) 10 MEQ CR tablet, TAKE ONE TABLET BY MOUTH DAILY WITH FOOD, Disp: 90 tablet, Rfl: 0  Patient Active Problem List    Diagnosis   • Obstructive sleep apnea [G47.33]   • Peripheral neuropathy, idiopathic [G60.9]   • Abnormal liver function tests [R79.89]     Overview Note:     Impression: 11/12/2015 - check cmp  Impression: 03/24/2015 - check cmp  Impression: 08/29/2014 - reviewed last lab work - good results  Impression: 05/09/2014 - update; Description: viral- resolved 3/13  normal hepatic u/s 3/13     • Anemia [D64.9]     Overview Note:     Impression: 03/24/2015 - check cbc  Impression: 11/28/2014 - check cbc;      • Benign essential hypertension [I10]     Overview Note:     Impression: 02/26/2016 - bp high- titrate medication  Impression: 11/12/2015 - bp is good  Impression: 06/25/2015 - bp is good  Impression: 03/24/2015 - bp is good  Impression: 11/28/2014 - bp is good  Impression: " 08/29/2014 - bp is good   continue to monitor  Impression: 05/09/2014 - bp is good  Impression: 02/05/2014 - Blood pressure is good, continue to monitor and continue current medication.; Description: normal renal u/s, no evidence of renal artery stenosis 3/13     • Diabetes mellitus [E11.9]     Overview Note:     Impression: 02/26/2016 - blood sugar 113, hgn a1c 7.4%   is up to date with eye exam    neuropathy stable - callus better   ur alb  - titrate losartan  Impression: 11/12/2015 - blood sugar is 136, hgn a1c 6.6%  doing well overall   is up to date with preventive care   bp is good   is stepping up diet and exercise, discussed reducing glimepiride by 1/2  Impression: 06/25/2015 - blood sugar is 151, hgn a1c 6.9%  average sugar is 150  is up to date with eye exam  ur alb 3/15 - on arb  neuropathy and hammertoe with callus stable  f/u 4 months or prn  Impression: 03/24/2015 - blood sugar 147, hgn a1c 7.3% (average 160)  is up to date with eye exam  neuropathy stable- healed blister right gt toe  ur alb due  Impression: 11/28/2014 - blood sugar and hgn a1c both in range- reviewed blood sugar average 6.8% (average 140)  is not haviing significant lows  is up to date with preventive care- commended efforts  will f/u when back from [place]  Impression: 08/29/2014 - blood sugar 207, hgn a1c 6.9%  average 150  is up to date with preventive care  good shoes for foot/risk of callus and ulcer discussed  f/u 3-4 months, no change in medication currently  Impression: 05/09/2014 - blood sugar is 256, hgn a1c 6.7% (good control overall)  is up to date on eye exam, no neuropathy but foot deformity and hammertoe - discussed [employer] for better foot wear  f/u 3-4 months  Impression: 02/05/2014 - II Diabetes Mellitus- blood sugars are better overall.  current blood sugar is 129, hgn a1c 7.4 (up from 6.9).  Is not having low blood sugars, is up to date on eye exam, no neuropathy.  Has nephropathy, bp is  good and she is on an ace/arb.  Continue to monitor blood sugars and watch diet, stay active.  Discussed foot care and the need for her to get arch supports due to high arch.;      • Gastroesophageal reflux disease [K21.9]   • Hyperlipidemia [E78.5]     Overview Note:     Impression: 02/26/2016 - good ldl last check  Impression: 11/12/2015 - check flp  Impression: 06/25/2015 - recent flp good  Impression: 03/24/2015 - check flp  Impression: 11/28/2014 - check flp  Impression: 08/29/2014 - reviewed last lab work, update next ov  good results last visit  Impression: 05/09/2014 - check flp  Impression: 02/05/2014 - good lipid control on lipitor. Update next visit.;      • Hypokalemia [E87.6]   • Calculus of kidney [N20.0]   • Cyst of ovary [N83.209]     Overview Note:     Description: calcification 2/13- by u/s 0.92x0.95cm  thought to be calcified cyst     • Renal insufficiency [N28.9]     Overview Note:     Impression: 11/12/2015 - check cmp  Impression: 06/25/2015 - improved function overall  Impression: 03/24/2015 - check cmp  Impression: 11/28/2014 - stable last check- update today  Impression: 08/29/2014 - stable function;      • Solitary pulmonary nodule [R91.1]     Overview Note:     Description: [facility] CAT scan 2/13 stable repeat yearly     • Vitamin D deficiency [E55.9]     Overview Note:     Impression: 11/12/2015 - last check good   f/u yearly  Impression: 06/25/2015 - on supplement  Impression: 03/24/2015 - check vitamin D level  Impression: 11/28/2014 - update levels;      • Abnormal weight loss [R63.4]     Review of Systems   Constitutional: Negative for activity change, appetite change, chills, diaphoresis, fatigue, fever and unexpected weight change.   HENT: Negative for congestion, dental problem, drooling, ear discharge, ear pain, facial swelling, hearing loss, mouth sores, nosebleeds, postnasal drip, rhinorrhea, sinus pressure, sneezing, sore throat, tinnitus, trouble swallowing and voice  change.    Eyes: Negative for photophobia, pain, discharge, redness, itching and visual disturbance.   Respiratory: Negative for apnea, cough, choking, chest tightness, shortness of breath, wheezing and stridor.    Cardiovascular: Negative for chest pain, palpitations and leg swelling.   Gastrointestinal: Negative for abdominal distention, abdominal pain, anal bleeding, blood in stool, constipation, diarrhea, nausea, rectal pain and vomiting.   Endocrine: Negative for cold intolerance, heat intolerance, polydipsia, polyphagia and polyuria.   Genitourinary: Negative for decreased urine volume, difficulty urinating, dysuria, enuresis, flank pain, frequency, genital sores, hematuria and urgency.   Musculoskeletal: Negative for arthralgias, back pain, gait problem, joint swelling, myalgias, neck pain and neck stiffness.   Skin: Negative for color change, pallor, rash and wound.        Change in mole upper back    Allergic/Immunologic: Negative for environmental allergies, food allergies and immunocompromised state.   Neurological: Negative for dizziness, tremors, seizures, syncope, facial asymmetry, speech difficulty, weakness, light-headedness, numbness and headaches.   Hematological: Negative for adenopathy. Does not bruise/bleed easily.   Psychiatric/Behavioral: Negative for agitation, behavioral problems, confusion, decreased concentration, dysphoric mood, hallucinations, self-injury, sleep disturbance and suicidal ideas. The patient is not nervous/anxious and is not hyperactive.      Social History     Social History   • Marital status:      Spouse name: N/A   • Number of children: N/A   • Years of education: N/A     Occupational History   • Not on file.     Social History Main Topics   • Smoking status: Former Smoker   • Smokeless tobacco: Not on file   • Alcohol use 4.2 oz/week     7 Glasses of wine per week   • Drug use: No   • Sexual activity: Defer     Other Topics Concern   • Not on file     Social  "History Narrative     Family History   Problem Relation Age of Onset   • Diabetes Father    • Diabetes Maternal Grandfather    • Coronary artery disease Other      /70  Pulse 74  Ht 63\" (160 cm)  Wt 173 lb (78.5 kg)  SpO2 99%  BMI 30.65 kg/m2  Physical Exam   Constitutional: She is oriented to person, place, and time. She appears well-developed and well-nourished.   HENT:   Head: Normocephalic and atraumatic.   Nose: Nose normal.   Mouth/Throat: Oropharynx is clear and moist.   Eyes: Conjunctivae, EOM and lids are normal. Pupils are equal, round, and reactive to light.   Neck: Trachea normal and normal range of motion. Neck supple. Carotid bruit is not present. No tracheal deviation present. No thyroid mass and no thyromegaly present.   Cardiovascular: Normal rate, regular rhythm, normal heart sounds and intact distal pulses.  Exam reveals no gallop and no friction rub.    No murmur heard.  Pulmonary/Chest: Effort normal and breath sounds normal. No respiratory distress. She has no wheezes.   Musculoskeletal: Normal range of motion. She exhibits no edema or deformity.   Bilateral hammer toes 2nd toe   oa changes   Callus medial gt toe    Lymphadenopathy:     She has no cervical adenopathy.   Neurological: She is alert and oriented to person, place, and time. She has normal reflexes. She displays normal reflexes. No cranial nerve deficit.   Skin: Skin is warm and dry. No rash noted. No cyanosis or erythema. Nails show no clubbing.   Psychiatric: She has a normal mood and affect. Her speech is normal and behavior is normal. Judgment and thought content normal. Cognition and memory are normal.   Nursing note and vitals reviewed.    Results for orders placed or performed in visit on 06/08/17   POC Glycosylated Hemoglobin (Hb A1C)   Result Value Ref Range    Hemoglobin A1C 6.9 %   POC Glucose Fingerstick   Result Value Ref Range    Glucose 139 (A) 70 - 130 mg/dL     Problem List Items Addressed This Visit     "    Cardiovascular and Mediastinum    Benign essential hypertension     bp is good          Hyperlipidemia     Check flp             Digestive    Vitamin D deficiency     Continue supplement and check yearly             Endocrine    Diabetes mellitus - Primary     Blood sugar and 90 day average sugar reviewed  Results for orders placed or performed in visit on 10/02/17   POC Glycosylated Hemoglobin (Hb A1C)   Result Value Ref Range    Hemoglobin A1C 7.5 %   POC Glucose Fingerstick   Result Value Ref Range    Glucose 139 (A) 70 - 130 mg/dL     Average sugar 165 - has been travelling and eating more  Is up to date with eye exam  preulcerative callus - medial gt toe   Bilateral foot deformity   Ur alb due today   F/u 3-4 months          Relevant Orders    POC Glycosylated Hemoglobin (Hb A1C) (Completed)    POC Glucose Fingerstick (Completed)       Nervous and Auditory    Peripheral neuropathy, idiopathic     See note above             Hematopoietic and Hemostatic    Anemia     Check cbc             Other    Abnormal liver function tests     Check cmp          Renal insufficiency     Check cmp            change in appearance of mole on back= recommended prompt evaluation via derm   Return in about 3 months (around 1/2/2018) for Recheck 30 min .    Renetta Rosario MA  Signed Maria Eugenia Lawrence MD

## 2017-10-02 NOTE — ASSESSMENT & PLAN NOTE
Blood sugar and 90 day average sugar reviewed  Results for orders placed or performed in visit on 10/02/17   POC Glycosylated Hemoglobin (Hb A1C)   Result Value Ref Range    Hemoglobin A1C 7.5 %   POC Glucose Fingerstick   Result Value Ref Range    Glucose 139 (A) 70 - 130 mg/dL     Average sugar 165 - has been travelling and eating more  Is up to date with eye exam  preulcerative callus - medial gt toe   Bilateral foot deformity   Ur alb due today   F/u 3-4 months

## 2017-10-03 LAB
CREAT 24H UR-MCNC: 64.2 MG/DL
MICROALBUMIN UR-MCNC: 12.4 UG/ML
MICROALBUMIN/CREAT UR: 19.3 MG/G CREAT (ref 0–30)

## 2017-10-30 RX ORDER — GLIMEPIRIDE 4 MG/1
TABLET ORAL
Qty: 90 TABLET | Refills: 0 | Status: SHIPPED | OUTPATIENT
Start: 2017-10-30 | End: 2018-01-27 | Stop reason: SDUPTHER

## 2017-10-30 RX ORDER — POTASSIUM CHLORIDE 750 MG/1
TABLET, EXTENDED RELEASE ORAL
Qty: 90 TABLET | Refills: 0 | Status: SHIPPED | OUTPATIENT
Start: 2017-10-30 | End: 2018-01-27 | Stop reason: SDUPTHER

## 2017-11-14 RX ORDER — ATORVASTATIN CALCIUM 10 MG/1
TABLET, FILM COATED ORAL
Qty: 90 TABLET | Refills: 0 | Status: SHIPPED | OUTPATIENT
Start: 2017-11-14 | End: 2018-02-26 | Stop reason: SDUPTHER

## 2018-01-01 RX ORDER — AMLODIPINE BESYLATE 5 MG/1
TABLET ORAL
Qty: 30 TABLET | Refills: 1 | Status: SHIPPED | OUTPATIENT
Start: 2018-01-01 | End: 2018-03-05 | Stop reason: SDUPTHER

## 2018-01-01 RX ORDER — LOSARTAN POTASSIUM AND HYDROCHLOROTHIAZIDE 12.5; 1 MG/1; MG/1
TABLET ORAL
Qty: 30 TABLET | Refills: 1 | Status: SHIPPED | OUTPATIENT
Start: 2018-01-01 | End: 2018-02-27 | Stop reason: SDUPTHER

## 2018-01-16 ENCOUNTER — OFFICE VISIT (OUTPATIENT)
Dept: ENDOCRINOLOGY | Facility: CLINIC | Age: 73
End: 2018-01-16

## 2018-01-16 VITALS
DIASTOLIC BLOOD PRESSURE: 70 MMHG | HEIGHT: 66 IN | BODY MASS INDEX: 28.93 KG/M2 | SYSTOLIC BLOOD PRESSURE: 118 MMHG | OXYGEN SATURATION: 98 % | WEIGHT: 180 LBS | HEART RATE: 71 BPM

## 2018-01-16 DIAGNOSIS — R79.89 ABNORMAL LIVER FUNCTION TESTS: ICD-10-CM

## 2018-01-16 DIAGNOSIS — E10.42 TYPE 1 DIABETES MELLITUS WITH DIABETIC POLYNEUROPATHY (HCC): ICD-10-CM

## 2018-01-16 DIAGNOSIS — E55.9 VITAMIN D DEFICIENCY: ICD-10-CM

## 2018-01-16 DIAGNOSIS — I10 BENIGN ESSENTIAL HYPERTENSION: ICD-10-CM

## 2018-01-16 DIAGNOSIS — E78.00 PURE HYPERCHOLESTEROLEMIA: ICD-10-CM

## 2018-01-16 DIAGNOSIS — E11.9 TYPE 2 DIABETES MELLITUS WITHOUT COMPLICATION, UNSPECIFIED LONG TERM INSULIN USE STATUS: Primary | ICD-10-CM

## 2018-01-16 LAB
GLUCOSE BLDC GLUCOMTR-MCNC: 93 MG/DL (ref 70–130)
HBA1C MFR BLD: 8 %

## 2018-01-16 PROCEDURE — 83036 HEMOGLOBIN GLYCOSYLATED A1C: CPT | Performed by: INTERNAL MEDICINE

## 2018-01-16 PROCEDURE — 82947 ASSAY GLUCOSE BLOOD QUANT: CPT | Performed by: INTERNAL MEDICINE

## 2018-01-16 PROCEDURE — 99214 OFFICE O/P EST MOD 30 MIN: CPT | Performed by: INTERNAL MEDICINE

## 2018-01-16 NOTE — ASSESSMENT & PLAN NOTE
Blood sugar and 90 day average sugar reviewed  Results for orders placed or performed in visit on 01/16/18   POC Glycosylated Hemoglobin (Hb A1C)   Result Value Ref Range    Hemoglobin A1C 8.0 %   POC Glucose Fingerstick   Result Value Ref Range    Glucose 93 70 - 130 mg/dL     Average sugar is higher- discussed with patient and she will get back on track with diet  Not active due to fracture  Is up to date with eye exam  No neuropathy   Ur alb neg 10/17   F/u 3-4 months

## 2018-01-16 NOTE — PROGRESS NOTES
Chinyere Olvera 72 y.o.  CC:Follow-up; Diabetes (Type II, eye exam was 3/2017 by Dr Fink); Hyperlipidemia; and Hypothyroidism      [place]: Follow-up; Diabetes (Type II, eye exam was 3/2017 by Dr Fink); Hyperlipidemia; and Hypothyroidism    Blood sugar and 90 day average sugar reviewed  Results for orders placed or performed in visit on 01/16/18   POC Glycosylated Hemoglobin (Hb A1C)   Result Value Ref Range    Hemoglobin A1C 8.0 %   POC Glucose Fingerstick   Result Value Ref Range    Glucose 93 70 - 130 mg/dL     Discussed average sugar, higher compared to last ov   Is on lantus and humalog, also on amaryl daily  Broke foot right before xmas- same break as on other foot (transverse fracture base of 5th metatarsal  Has had dexa and is effective, is taking vitamin D   She has not been watching diet  No neuropathy   utd with eye exam3/17  Callus right gt toe with bleeding underneath - new lesion   Ur alb neg 10/17    Allergies   Allergen Reactions   • Penicillins        Current Outpatient Prescriptions:   •  amLODIPine (NORVASC) 5 MG tablet, TAKE ONE TABLET BY MOUTH DAILY, Disp: 30 tablet, Rfl: 1  •  aspirin 81 MG tablet, Take  by mouth., Disp: , Rfl:   •  atorvastatin (LIPITOR) 10 MG tablet, TAKE ONE TABLET BY MOUTH DAILY, Disp: 90 tablet, Rfl: 0  •  B-D ULTRAFINE III SHORT PEN 31G X 8 MM misc, USE FOUR TIMES A DAY OR AS DIRECTED, Disp: 200 each, Rfl: 5  •  Blood Glucose Monitoring Suppl (ONE TOUCH ULTRA 2) W/DEVICE kit, Use daily to test blood sugars  Dx:  E11.9, Disp: 1 each, Rfl: 0  •  Ferrous Fumarate (IRON) 18 MG tablet controlled-release, Take  by mouth daily., Disp: , Rfl:   •  glimepiride (AMARYL) 4 MG tablet, TAKE ONE TABLET BY MOUTH DAILY, Disp: 90 tablet, Rfl: 0  •  glucose blood (ONE TOUCH ULTRA TEST) test strip, Test TID DX:  E11.9, Disp: 100 each, Rfl: 11  •  HUMALOG KWIKPEN 100 UNIT/ML solution pen-injector, INJECT 10 UNITS UNDER THE SKIN THREE TIMES A DAY WITH MEALS OR AS DIRECTED (Patient taking  "differently: INJECT 10-12 UNITS UNDER THE SKIN THREE TIMES A DAY WITH MEALS OR AS DIRECTED), Disp: 1 pen, Rfl: 4  •  Insulin Syringe-Needle U-100 (BD INSULIN SYRINGE ULTRAFINE) 31G X 5/16\" 0.5 ML misc, , Disp: , Rfl:   •  KLOR-CON 10 MEQ CR tablet, TAKE ONE TABLET BY MOUTH DAILY WITH FOOD, Disp: 90 tablet, Rfl: 0  •  lansoprazole (PREVACID) 30 MG capsule, Take  by mouth., Disp: , Rfl:   •  LANTUS SOLOSTAR 100 UNIT/ML injection pen, INJECT 30 UNITS UNDER THE SKIN EVERY NIGHT AT BEDTIME, Disp: 15 mL, Rfl: 4  •  losartan-hydrochlorothiazide (HYZAAR) 100-12.5 MG per tablet, TAKE ONE TABLET BY MOUTH DAILY, Disp: 30 tablet, Rfl: 1  •  metoprolol tartrate (LOPRESSOR) 25 MG tablet, Take 0.25 tablets by mouth 2 (two) times a day., Disp: , Rfl:   •  Omega-3 Fatty Acids (FISH OIL) 1000 MG capsule capsule, Take  by mouth daily., Disp: , Rfl:   •  ONETOUCH DELICA LANCETS 33G misc, , Disp: , Rfl:   Patient Active Problem List    Diagnosis   • Obstructive sleep apnea [G47.33]   • Peripheral neuropathy, idiopathic [G60.9]   • Abnormal liver function tests [R79.89]     Overview Note:     Impression: 11/12/2015 - check cmp  Impression: 03/24/2015 - check cmp  Impression: 08/29/2014 - reviewed last lab work - good results  Impression: 05/09/2014 - update; Description: viral- resolved 3/13  normal hepatic u/s 3/13     • Anemia [D64.9]     Overview Note:     Impression: 03/24/2015 - check cbc  Impression: 11/28/2014 - check cbc;      • Benign essential hypertension [I10]     Overview Note:     Impression: 02/26/2016 - bp high- titrate medication  Impression: 11/12/2015 - bp is good  Impression: 06/25/2015 - bp is good  Impression: 03/24/2015 - bp is good  Impression: 11/28/2014 - bp is good  Impression: 08/29/2014 - bp is good   continue to monitor  Impression: 05/09/2014 - bp is good  Impression: 02/05/2014 - Blood pressure is good, continue to monitor and continue current medication.; Description: normal renal u/s, no evidence of " renal artery stenosis 3/13     • Diabetes mellitus [E11.9]     Overview Note:     Impression: 02/26/2016 - blood sugar 113, hgn a1c 7.4%   is up to date with eye exam    neuropathy stable - callus better   ur alb  - titrate losartan  Impression: 11/12/2015 - blood sugar is 136, hgn a1c 6.6%  doing well overall   is up to date with preventive care   bp is good   is stepping up diet and exercise, discussed reducing glimepiride by 1/2  Impression: 06/25/2015 - blood sugar is 151, hgn a1c 6.9%  average sugar is 150  is up to date with eye exam  ur alb 3/15 - on arb  neuropathy and hammertoe with callus stable  f/u 4 months or prn  Impression: 03/24/2015 - blood sugar 147, hgn a1c 7.3% (average 160)  is up to date with eye exam  neuropathy stable- healed blister right gt toe  ur alb due  Impression: 11/28/2014 - blood sugar and hgn a1c both in range- reviewed blood sugar average 6.8% (average 140)  is not haviing significant lows  is up to date with preventive care- commended efforts  will f/u when back from [place]  Impression: 08/29/2014 - blood sugar 207, hgn a1c 6.9%  average 150  is up to date with preventive care  good shoes for foot/risk of callus and ulcer discussed  f/u 3-4 months, no change in medication currently  Impression: 05/09/2014 - blood sugar is 256, hgn a1c 6.7% (good control overall)  is up to date on eye exam, no neuropathy but foot deformity and hammertoe - discussed [employer] for better foot wear  f/u 3-4 months  Impression: 02/05/2014 - II Diabetes Mellitus- blood sugars are better overall.  current blood sugar is 129, hgn a1c 7.4 (up from 6.9).  Is not having low blood sugars, is up to date on eye exam, no neuropathy.  Has nephropathy, bp is good and she is on an ace/arb.  Continue to monitor blood sugars and watch diet, stay active.  Discussed foot care and the need for her to get arch supports due to high arch.;      • Gastroesophageal reflux disease [K21.9]   •  Hyperlipidemia [E78.5]     Overview Note:     Impression: 02/26/2016 - good ldl last check  Impression: 11/12/2015 - check flp  Impression: 06/25/2015 - recent flp good  Impression: 03/24/2015 - check flp  Impression: 11/28/2014 - check flp  Impression: 08/29/2014 - reviewed last lab work, update next ov  good results last visit  Impression: 05/09/2014 - check flp  Impression: 02/05/2014 - good lipid control on lipitor. Update next visit.;      • Hypokalemia [E87.6]   • Calculus of kidney [N20.0]   • Cyst of ovary [N83.209]     Overview Note:     Description: calcification 2/13- by u/s 0.92x0.95cm  thought to be calcified cyst     • Renal insufficiency [N28.9]     Overview Note:     Impression: 11/12/2015 - check cmp  Impression: 06/25/2015 - improved function overall  Impression: 03/24/2015 - check cmp  Impression: 11/28/2014 - stable last check- update today  Impression: 08/29/2014 - stable function;      • Solitary pulmonary nodule [R91.1]     Overview Note:     Description: [facility] CAT scan 2/13 stable repeat yearly     • Vitamin D deficiency [E55.9]     Overview Note:     Impression: 11/12/2015 - last check good   f/u yearly  Impression: 06/25/2015 - on supplement  Impression: 03/24/2015 - check vitamin D level  Impression: 11/28/2014 - update levels;      • Abnormal weight loss [R63.4]     Review of Systems   Constitutional: Negative for activity change, appetite change, chills, diaphoresis, fatigue, fever and unexpected weight change.   HENT: Negative for congestion, dental problem, drooling, ear discharge, ear pain, facial swelling, hearing loss, mouth sores, nosebleeds, postnasal drip, rhinorrhea, sinus pressure, sneezing, sore throat, tinnitus, trouble swallowing and voice change.    Eyes: Negative for photophobia, pain, discharge, redness, itching and visual disturbance.   Respiratory: Negative for apnea, cough, choking, chest tightness, shortness of breath, wheezing and stridor.    Cardiovascular:  "Negative for chest pain, palpitations and leg swelling.   Gastrointestinal: Negative for abdominal distention, abdominal pain, anal bleeding, blood in stool, constipation, diarrhea, nausea, rectal pain and vomiting.   Endocrine: Negative for cold intolerance, heat intolerance, polydipsia, polyphagia and polyuria.   Genitourinary: Negative for decreased urine volume, difficulty urinating, dysuria, enuresis, flank pain, frequency, genital sores, hematuria and urgency.   Musculoskeletal: Positive for arthralgias. Negative for back pain, gait problem, joint swelling, myalgias, neck pain and neck stiffness.        Foot fracture   Skin: Negative for color change, pallor, rash and wound.   Allergic/Immunologic: Negative for environmental allergies, food allergies and immunocompromised state.   Neurological: Negative for dizziness, tremors, seizures, syncope, facial asymmetry, speech difficulty, weakness, light-headedness, numbness and headaches.   Hematological: Negative for adenopathy. Does not bruise/bleed easily.   Psychiatric/Behavioral: Negative for agitation, behavioral problems, confusion, decreased concentration, dysphoric mood, hallucinations, self-injury, sleep disturbance and suicidal ideas. The patient is not nervous/anxious and is not hyperactive.      Social History     Social History   • Marital status:      Spouse name: N/A   • Number of children: N/A   • Years of education: N/A     Occupational History   • Not on file.     Social History Main Topics   • Smoking status: Former Smoker   • Smokeless tobacco: Never Used   • Alcohol use 4.2 oz/week     7 Glasses of wine per week   • Drug use: No   • Sexual activity: Defer     Other Topics Concern   • Not on file     Social History Narrative     Family History   Problem Relation Age of Onset   • Diabetes Father    • Diabetes Maternal Grandfather    • Coronary artery disease Other      /70  Pulse 71  Ht 167.6 cm (66\")  Wt 81.6 kg (180 lb)  SpO2 " 98%  BMI 29.05 kg/m2  Physical Exam   Constitutional: She is oriented to person, place, and time. She appears well-developed and well-nourished.   HENT:   Head: Normocephalic and atraumatic.   Nose: Nose normal.   Mouth/Throat: Oropharynx is clear and moist.   Eyes: Conjunctivae, EOM and lids are normal. Pupils are equal, round, and reactive to light.   Neck: Trachea normal and normal range of motion. Neck supple. Carotid bruit is not present. No tracheal deviation present. No thyroid mass and no thyromegaly present.   Cardiovascular: Normal rate, regular rhythm, normal heart sounds and intact distal pulses.  Exam reveals no gallop and no friction rub.    No murmur heard.  Pulmonary/Chest: Effort normal and breath sounds normal. No respiratory distress. She has no wheezes.   Musculoskeletal: Normal range of motion. She exhibits no edema or deformity.    Chinyere had a diabetic foot exam performed today.   During the foot exam she had a monofilament test performed.    Neurological Sensory Findings - Unaltered hot/cold right ankle/foot discrimination and unaltered hot/cold left ankle/foot discrimination. Unaltered sharp/dull right ankle/foot discrimination and unaltered sharp/dull left ankle/foot discrimination. Right ankle/foot altered proprioception and left ankle/foot altered proprioception.    Vascular Status -  Her exam exhibits right foot vasculature normal. Her exam exhibits no right foot edema. Her exam exhibits left foot vasculature normal. Her exam exhibits no left foot edema.   Skin Integrity  -  Her right foot skin is intact.     Chinyere 's left foot skin is intact. .     Lymphadenopathy:     She has no cervical adenopathy.   Neurological: She is alert and oriented to person, place, and time. She has normal reflexes. She displays normal reflexes. No cranial nerve deficit.   Skin: Skin is warm and dry. No rash noted. No cyanosis or erythema. Nails show no clubbing.   Psychiatric: She has a normal mood and affect.  Her speech is normal and behavior is normal. Judgment and thought content normal. Cognition and memory are normal.   Nursing note and vitals reviewed.    Results for orders placed or performed in visit on 10/02/17   TSH   Result Value Ref Range    TSH 1.541 0.350 - 5.350 mIU/mL   T4, Free   Result Value Ref Range    Free T4 1.22 0.89 - 1.76 ng/dL   Comprehensive Metabolic Panel   Result Value Ref Range    Glucose 81 70 - 100 mg/dL    BUN 24 (H) 9 - 23 mg/dL    Creatinine 1.10 0.60 - 1.30 mg/dL    Sodium 144 132 - 146 mmol/L    Potassium 4.7 3.5 - 5.5 mmol/L    Chloride 108 99 - 109 mmol/L    CO2 31.0 20.0 - 31.0 mmol/L    Calcium 10.4 8.7 - 10.4 mg/dL    Total Protein 7.2 5.7 - 8.2 g/dL    Albumin 4.80 3.20 - 4.80 g/dL    ALT (SGPT) 36 7 - 40 U/L    AST (SGOT) 28 0 - 33 U/L    Alkaline Phosphatase 110 (H) 25 - 100 U/L    Total Bilirubin 0.7 0.3 - 1.2 mg/dL    eGFR Non African Amer 49 (L) >60 mL/min/1.73    Globulin 2.4 gm/dL    A/G Ratio 2.0 1.5 - 2.5 g/dL    BUN/Creatinine Ratio 21.8 7.0 - 25.0    Anion Gap 5.0 3.0 - 11.0 mmol/L   Microalbumin / Creatinine Urine Ratio - Urine, Clean Catch   Result Value Ref Range    Creatinine, Urine 64.2 Not Estab. mg/dL    Microalbumin, Urine 12.4 Not Estab. ug/mL    Microalbumin/Creatinine Ratio 19.3 0.0 - 30.0 mg/g creat   Lipid Panel   Result Value Ref Range    Total Cholesterol 145 0 - 200 mg/dL    Triglycerides 85 0 - 150 mg/dL    HDL Cholesterol 55 40 - 60 mg/dL    LDL Cholesterol  79 0 - 130 mg/dL   Hepatitis C Antibody   Result Value Ref Range    Hepatitis C Ab Non-Reactive Non-Reactive   CBC Auto Differential   Result Value Ref Range    WBC 7.05 3.50 - 10.80 10*3/mm3    RBC 4.32 3.89 - 5.14 10*6/mm3    Hemoglobin 13.3 11.5 - 15.5 g/dL    Hematocrit 39.8 34.5 - 44.0 %    MCV 92.1 80.0 - 99.0 fL    MCH 30.8 27.0 - 31.0 pg    MCHC 33.4 32.0 - 36.0 g/dL    RDW 12.7 11.3 - 14.5 %    RDW-SD 43.1 37.0 - 54.0 fl    MPV 10.6 6.0 - 12.0 fL    Platelets 239 150 - 450 10*3/mm3     Neutrophil % 68.3 41.0 - 71.0 %    Lymphocyte % 24.0 24.0 - 44.0 %    Monocyte % 6.2 0.0 - 12.0 %    Eosinophil % 1.0 0.0 - 3.0 %    Basophil % 0.4 0.0 - 1.0 %    Immature Grans % 0.1 0.0 - 0.6 %    Neutrophils, Absolute 4.81 1.50 - 8.30 10*3/mm3    Lymphocytes, Absolute 1.69 0.60 - 4.80 10*3/mm3    Monocytes, Absolute 0.44 0.00 - 1.00 10*3/mm3    Eosinophils, Absolute 0.07 0.00 - 0.30 10*3/mm3    Basophils, Absolute 0.03 0.00 - 0.20 10*3/mm3    Immature Grans, Absolute 0.01 0.00 - 0.03 10*3/mm3   POC Glycosylated Hemoglobin (Hb A1C)   Result Value Ref Range    Hemoglobin A1C 7.5 %   POC Glucose Fingerstick   Result Value Ref Range    Glucose 139 (A) 70 - 130 mg/dL     Chinyere was seen today for follow-up, diabetes, hyperlipidemia and hypothyroidism.    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     Blood sugar and 90 day average sugar reviewed  Results for orders placed or performed in visit on 01/16/18   POC Glycosylated Hemoglobin (Hb A1C)   Result Value Ref Range    Hemoglobin A1C 8.0 %   POC Glucose Fingerstick   Result Value Ref Range    Glucose 93 70 - 130 mg/dL     Average sugar is higher- discussed with patient and she will get back on track with diet  Not active due to fracture  Is up to date with eye exam  No neuropathy   Ur alb neg 10/17   F/u 3-4 months             Other    Abnormal liver function tests     Recent lfts discussed              Return in about 3 months (around 4/16/2018).    Renetta Rosario,  MA  Signed Maria Eugenia Lawrence MD

## 2018-01-29 RX ORDER — GLIMEPIRIDE 4 MG/1
TABLET ORAL
Qty: 90 TABLET | Refills: 0 | Status: SHIPPED | OUTPATIENT
Start: 2018-01-29 | End: 2018-05-01 | Stop reason: SDUPTHER

## 2018-01-29 RX ORDER — POTASSIUM CHLORIDE 750 MG/1
TABLET, EXTENDED RELEASE ORAL
Qty: 90 TABLET | Refills: 0 | Status: SHIPPED | OUTPATIENT
Start: 2018-01-29 | End: 2018-05-01 | Stop reason: SDUPTHER

## 2018-02-26 DIAGNOSIS — Z79.4 TYPE 2 DIABETES MELLITUS WITHOUT COMPLICATION, WITH LONG-TERM CURRENT USE OF INSULIN (HCC): ICD-10-CM

## 2018-02-26 DIAGNOSIS — E11.9 TYPE 2 DIABETES MELLITUS WITHOUT COMPLICATION, WITH LONG-TERM CURRENT USE OF INSULIN (HCC): ICD-10-CM

## 2018-02-26 RX ORDER — ATORVASTATIN CALCIUM 10 MG/1
TABLET, FILM COATED ORAL
Qty: 90 TABLET | Refills: 0 | Status: SHIPPED | OUTPATIENT
Start: 2018-02-26 | End: 2018-05-30 | Stop reason: SDUPTHER

## 2018-02-27 RX ORDER — LOSARTAN POTASSIUM AND HYDROCHLOROTHIAZIDE 12.5; 1 MG/1; MG/1
TABLET ORAL
Qty: 90 TABLET | Refills: 1 | Status: SHIPPED | OUTPATIENT
Start: 2018-02-27 | End: 2018-08-30 | Stop reason: SDUPTHER

## 2018-03-05 RX ORDER — AMLODIPINE BESYLATE 5 MG/1
TABLET ORAL
Qty: 30 TABLET | Refills: 0 | Status: SHIPPED | OUTPATIENT
Start: 2018-03-05 | End: 2018-04-03 | Stop reason: SDUPTHER

## 2018-03-09 NOTE — TELEPHONE ENCOUNTER
Pricila sent notice to send rx for losartan hct for 90 day supply to [facility] however this rx was sent 2-7 for 90 day supply so nothing further necessary at this time

## 2018-04-03 RX ORDER — AMLODIPINE BESYLATE 5 MG/1
TABLET ORAL
Qty: 30 TABLET | Refills: 2 | Status: SHIPPED | OUTPATIENT
Start: 2018-04-03 | End: 2018-07-06 | Stop reason: SDUPTHER

## 2018-04-27 RX ORDER — INSULIN GLARGINE 100 [IU]/ML
INJECTION, SOLUTION SUBCUTANEOUS
Qty: 3 PEN | Refills: 0 | Status: SHIPPED | OUTPATIENT
Start: 2018-04-27 | End: 2018-06-25 | Stop reason: SDUPTHER

## 2018-05-02 RX ORDER — GLIMEPIRIDE 4 MG/1
TABLET ORAL
Qty: 90 TABLET | Refills: 0 | Status: SHIPPED | OUTPATIENT
Start: 2018-05-02 | End: 2018-07-30 | Stop reason: SDUPTHER

## 2018-05-02 RX ORDER — POTASSIUM CHLORIDE 750 MG/1
TABLET, EXTENDED RELEASE ORAL
Qty: 90 TABLET | Refills: 0 | Status: SHIPPED | OUTPATIENT
Start: 2018-05-02 | End: 2018-08-11 | Stop reason: SDUPTHER

## 2018-05-04 ENCOUNTER — OFFICE VISIT (OUTPATIENT)
Dept: ENDOCRINOLOGY | Facility: CLINIC | Age: 73
End: 2018-05-04

## 2018-05-04 VITALS
HEIGHT: 66 IN | OXYGEN SATURATION: 99 % | DIASTOLIC BLOOD PRESSURE: 70 MMHG | BODY MASS INDEX: 29.25 KG/M2 | WEIGHT: 182 LBS | SYSTOLIC BLOOD PRESSURE: 124 MMHG | HEART RATE: 73 BPM

## 2018-05-04 DIAGNOSIS — E78.00 PURE HYPERCHOLESTEROLEMIA: ICD-10-CM

## 2018-05-04 DIAGNOSIS — I10 BENIGN ESSENTIAL HYPERTENSION: ICD-10-CM

## 2018-05-04 DIAGNOSIS — G47.33 OBSTRUCTIVE SLEEP APNEA: ICD-10-CM

## 2018-05-04 DIAGNOSIS — E10.42 TYPE 1 DIABETES MELLITUS WITH DIABETIC POLYNEUROPATHY (HCC): Primary | ICD-10-CM

## 2018-05-04 LAB
GLUCOSE BLDC GLUCOMTR-MCNC: 159 MG/DL (ref 70–130)
HBA1C MFR BLD: 7.2 %

## 2018-05-04 PROCEDURE — 82947 ASSAY GLUCOSE BLOOD QUANT: CPT | Performed by: INTERNAL MEDICINE

## 2018-05-04 PROCEDURE — 83036 HEMOGLOBIN GLYCOSYLATED A1C: CPT | Performed by: INTERNAL MEDICINE

## 2018-05-04 PROCEDURE — 99214 OFFICE O/P EST MOD 30 MIN: CPT | Performed by: INTERNAL MEDICINE

## 2018-05-04 RX ORDER — BUPROPION HYDROCHLORIDE 150 MG/1
TABLET ORAL
COMMUNITY
Start: 2018-03-06

## 2018-05-04 NOTE — ASSESSMENT & PLAN NOTE
Blood sugar and 90 day average sugar reviewed  Results for orders placed or performed in visit on 05/04/18   POC Glycosylated Hemoglobin (Hb A1C)   Result Value Ref Range    Hemoglobin A1C 7.2 %   POC Glucose Fingerstick   Result Value Ref Range    Glucose 159 (A) 70 - 130 mg/dL     Is utd with eye exam - Dr Fink  No foot lesion but risk reviewed due to hammertoes, heel callus   Ur alb neg 10/18  Good glycemic awaredness  Nosebleeds- stopped asa   Commended improvement in average sugar   She is wearing cpap nightly   No changes for now- recheck 3-4 months

## 2018-05-04 NOTE — PROGRESS NOTES
"Chinyere Olvera 73 y.o.  [facility]:Follow-up; Diabetes (Type II, eye exam was 4-2018 by Dr Fink); Hyperlipidemia; and Hypothyroidism      [place]: Follow-up; Diabetes (Type II, eye exam was 4-2018 by Dr Fink); Hyperlipidemia; and Hypothyroidism    Blood sugar and 90 day average sugar reviewed  Results for orders placed or performed in visit on 05/04/18   POC Glycosylated Hemoglobin (Hb A1C)   Result Value Ref Range    Hemoglobin A1C 7.2 %   POC Glucose Fingerstick   Result Value Ref Range    Glucose 159 (A) 70 - 130 mg/dL     Average sugar is 150   Is utd with eye exam  No neuropathy   Ur alb neg 10/17  LDL 79 on lipitor   On ca ch blocker for bp along with arb   No severe low sugars   Energy is good     Allergies   Allergen Reactions   • Penicillins        Current Outpatient Prescriptions:   •  amLODIPine (NORVASC) 5 MG tablet, TAKE ONE TABLET BY MOUTH DAILY, Disp: 30 tablet, Rfl: 2  •  atorvastatin (LIPITOR) 10 MG tablet, TAKE ONE TABLET BY MOUTH DAILY, Disp: 90 tablet, Rfl: 0  •  Blood Glucose Monitoring Suppl (ONE TOUCH ULTRA 2) W/DEVICE kit, Use daily to test blood sugars  Dx:  E11.9, Disp: 1 each, Rfl: 0  •  Ferrous Fumarate (IRON) 18 MG tablet controlled-release, Take  by mouth daily., Disp: , Rfl:   •  glimepiride (AMARYL) 4 MG tablet, TAKE ONE TABLET BY MOUTH DAILY, Disp: 90 tablet, Rfl: 0  •  HUMALOG KWIKPEN 100 UNIT/ML solution pen-injector, INJECT 10 UNITS UNDER THE SKIN THREE TIMES A DAY WITH MEALS OR AS DIRECTED (Patient taking differently: INJECT 10-12 UNITS UNDER THE SKIN THREE TIMES A DAY WITH MEALS OR AS DIRECTED), Disp: 1 pen, Rfl: 4  •  Insulin Pen Needle (B-D ULTRAFINE III SHORT PEN) 31G X 8 MM misc, Use QID to inject insulin, Disp: 400 each, Rfl: 3  •  Insulin Syringe-Needle U-100 (BD INSULIN SYRINGE ULTRAFINE) 31G X 5/16\" 0.5 ML misc, , Disp: , Rfl:   •  lansoprazole (PREVACID) 30 MG capsule, Take  by mouth., Disp: , Rfl:   •  LANTUS SOLOSTAR 100 UNIT/ML injection pen, INJECT 30 UNITS UNDER THE SKIN " EVERY NIGHT AT BEDTIME, Disp: 3 pen, Rfl: 0  •  losartan-hydrochlorothiazide (HYZAAR) 100-12.5 MG per tablet, 1 TABLET DAILY, Disp: 90 tablet, Rfl: 1  •  metoprolol tartrate (LOPRESSOR) 25 MG tablet, Take 0.25 tablets by mouth 2 (two) times a day., Disp: , Rfl:   •  Omega-3 Fatty Acids (FISH OIL) 1000 MG capsule capsule, Take  by mouth daily., Disp: , Rfl:   •  ONE TOUCH ULTRA TEST test strip, USE 1 STRIP TO TEST BLOOD GLUCOSE THREE TIMES A DAY, Disp: 100 each, Rfl: 10  •  ONETOUCH DELICA LANCETS 33G misc, , Disp: , Rfl:   •  potassium chloride (K-DUR,KLOR-CON) 10 MEQ CR tablet, TAKE ONE TABLET BY MOUTH DAILY WITH FOOD, Disp: 90 tablet, Rfl: 0  Patient Active Problem List    Diagnosis   • Obstructive sleep apnea [G47.33]   • Peripheral neuropathy, idiopathic [G60.9]   • Abnormal liver function tests [R79.89]     Overview Note:     Impression: 11/12/2015 - check cmp  Impression: 03/24/2015 - check cmp  Impression: 08/29/2014 - reviewed last lab work - good results  Impression: 05/09/2014 - update; Description: viral- resolved 3/13  normal hepatic u/s 3/13     • Anemia [D64.9]     Overview Note:     Impression: 03/24/2015 - check cbc  Impression: 11/28/2014 - check cbc;      • Benign essential hypertension [I10]     Overview Note:     Impression: 02/26/2016 - bp high- titrate medication  Impression: 11/12/2015 - bp is good  Impression: 06/25/2015 - bp is good  Impression: 03/24/2015 - bp is good  Impression: 11/28/2014 - bp is good  Impression: 08/29/2014 - bp is good   continue to monitor  Impression: 05/09/2014 - bp is good  Impression: 02/05/2014 - Blood pressure is good, continue to monitor and continue current medication.; Description: normal renal u/s, no evidence of renal artery stenosis 3/13     • 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]     Overview Note:     Impression: 02/26/2016 - blood sugar 113, hgn a1c 7.4%   is up to date with eye exam    neuropathy stable - callus better   ur  alb  - titrate losartan  Impression: 11/12/2015 - blood sugar is 136, hgn a1c 6.6%  doing well overall   is up to date with preventive care   bp is good   is stepping up diet and exercise, discussed reducing glimepiride by 1/2  Impression: 06/25/2015 - blood sugar is 151, hgn a1c 6.9%  average sugar is 150  is up to date with eye exam  ur alb 3/15 - on arb  neuropathy and hammertoe with callus stable  f/u 4 months or prn  Impression: 03/24/2015 - blood sugar 147, hgn a1c 7.3% (average 160)  is up to date with eye exam  neuropathy stable- healed blister right gt toe  ur alb due  Impression: 11/28/2014 - blood sugar and hgn a1c both in range- reviewed blood sugar average 6.8% (average 140)  is not haviing significant lows  is up to date with preventive care- commended efforts  will f/u when back from [place]  Impression: 08/29/2014 - blood sugar 207, hgn a1c 6.9%  average 150  is up to date with preventive care  good shoes for foot/risk of callus and ulcer discussed  f/u 3-4 months, no change in medication currently  Impression: 05/09/2014 - blood sugar is 256, hgn a1c 6.7% (good control overall)  is up to date on eye exam, no neuropathy but foot deformity and hammertoe - discussed [employer] for better foot wear  f/u 3-4 months  Impression: 02/05/2014 - II Diabetes Mellitus- blood sugars are better overall.  current blood sugar is 129, hgn a1c 7.4 (up from 6.9).  Is not having low blood sugars, is up to date on eye exam, no neuropathy.  Has nephropathy, bp is good and she is on an ace/arb.  Continue to monitor blood sugars and watch diet, stay active.  Discussed foot care and the need for her to get arch supports due to high arch.;      • Gastroesophageal reflux disease [K21.9]   • Hyperlipidemia [E78.5]     Overview Note:     Impression: 02/26/2016 - good ldl last check  Impression: 11/12/2015 - check flp  Impression: 06/25/2015 - recent flp good  Impression: 03/24/2015 - check flp  Impression:  11/28/2014 - check flp  Impression: 08/29/2014 - reviewed last lab work, update next ov  good results last visit  Impression: 05/09/2014 - check flp  Impression: 02/05/2014 - good lipid control on lipitor. Update next visit.;      • Hypokalemia [E87.6]   • Calculus of kidney [N20.0]   • Cyst of ovary [N83.209]     Overview Note:     Description: calcification 2/13- by u/s 0.92x0.95cm  thought to be calcified cyst     • Renal insufficiency [N28.9]     Overview Note:     Impression: 11/12/2015 - check cmp  Impression: 06/25/2015 - improved function overall  Impression: 03/24/2015 - check cmp  Impression: 11/28/2014 - stable last check- update today  Impression: 08/29/2014 - stable function;      • Solitary pulmonary nodule [R91.1]     Overview Note:     Description: [facility] CAT scan 2/13 stable repeat yearly     • Vitamin D deficiency [E55.9]     Overview Note:     Impression: 11/12/2015 - last check good   f/u yearly  Impression: 06/25/2015 - on supplement  Impression: 03/24/2015 - check vitamin D level  Impression: 11/28/2014 - update levels;      • Abnormal weight loss [R63.4]     Review of Systems   Constitutional: Negative for activity change, appetite change, chills, diaphoresis, fatigue, fever and unexpected weight change.   HENT: Negative for congestion, dental problem, drooling, ear discharge, ear pain, facial swelling, hearing loss, mouth sores, nosebleeds, postnasal drip, rhinorrhea, sinus pressure, sneezing, sore throat, tinnitus, trouble swallowing and voice change.    Eyes: Negative for photophobia, pain, discharge, redness, itching and visual disturbance.   Respiratory: Negative for apnea, cough, choking, chest tightness, shortness of breath, wheezing and stridor.    Cardiovascular: Negative for chest pain, palpitations and leg swelling.   Gastrointestinal: Negative for abdominal distention, abdominal pain, anal bleeding, blood in stool, constipation, diarrhea, nausea, rectal pain and vomiting.  "  Endocrine: Negative for cold intolerance, heat intolerance, polydipsia, polyphagia and polyuria.   Genitourinary: Negative for decreased urine volume, difficulty urinating, dysuria, enuresis, flank pain, frequency, genital sores, hematuria and urgency.   Musculoskeletal: Negative for arthralgias, back pain, gait problem, joint swelling, myalgias, neck pain and neck stiffness.   Skin: Negative for color change, pallor, rash and wound.   Allergic/Immunologic: Negative for environmental allergies, food allergies and immunocompromised state.   Neurological: Negative for dizziness, tremors, seizures, syncope, facial asymmetry, speech difficulty, weakness, light-headedness, numbness and headaches.   Hematological: Negative for adenopathy. Does not bruise/bleed easily.   Psychiatric/Behavioral: Negative for agitation, behavioral problems, confusion, decreased concentration, dysphoric mood, hallucinations, self-injury, sleep disturbance and suicidal ideas. The patient is not nervous/anxious and is not hyperactive.      Social History     Social History   • Marital status:      Spouse name: N/A   • Number of children: N/A   • Years of education: N/A     Occupational History   • Not on file.     Social History Main Topics   • Smoking status: Former Smoker   • Smokeless tobacco: Never Used   • Alcohol use 4.2 oz/week     7 Glasses of wine per week   • Drug use: No   • Sexual activity: Defer     Other Topics Concern   • Not on file     Social History Narrative   • No narrative on file     Family History   Problem Relation Age of Onset   • Diabetes Father    • Diabetes Maternal Grandfather    • Coronary artery disease Other      /70   Pulse 73   Ht 167.6 cm (66\")   Wt 82.6 kg (182 lb)   SpO2 99%   BMI 29.38 kg/m²   Physical Exam   Constitutional: She is oriented to person, place, and time. She appears well-developed and well-nourished.   HENT:   Head: Normocephalic and atraumatic.   Nose: Nose normal. "   Mouth/Throat: Oropharynx is clear and moist.   Eyes: Conjunctivae, EOM and lids are normal. Pupils are equal, round, and reactive to light.   Neck: Trachea normal and normal range of motion. Neck supple. Carotid bruit is not present. No tracheal deviation present. No thyroid mass and no thyromegaly present.   Cardiovascular: Normal rate, regular rhythm, normal heart sounds and intact distal pulses.  Exam reveals no gallop and no friction rub.    No murmur heard.  Pulmonary/Chest: Effort normal and breath sounds normal. No respiratory distress. She has no wheezes.   Musculoskeletal: Normal range of motion. She exhibits no edema or deformity.   Lymphadenopathy:     She has no cervical adenopathy.   Neurological: She is alert and oriented to person, place, and time. She has normal reflexes. She displays normal reflexes. No cranial nerve deficit.   Skin: Skin is warm and dry. No rash noted. No cyanosis or erythema. Nails show no clubbing.   Psychiatric: She has a normal mood and affect. Her speech is normal and behavior is normal. Judgment and thought content normal. Cognition and memory are normal.   Nursing note and vitals reviewed.    Results for orders placed or performed in visit on 01/16/18   POC Glycosylated Hemoglobin (Hb A1C)   Result Value Ref Range    Hemoglobin A1C 8.0 %   POC Glucose Fingerstick   Result Value Ref Range    Glucose 93 70 - 130 mg/dL     Chinyere was seen today for follow-up, diabetes, hyperlipidemia and hypothyroidism.    Diagnoses and all orders for this visit:    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  -     POC Glycosylated Hemoglobin (Hb A1C)  -     POC Glucose Fingerstick    Other orders  -     Insulin Pen Needle (B-D ULTRAFINE III SHORT PEN) 31G X 8 MM misc; Use QID to inject insulin      Problem List Items Addressed This Visit        Cardiovascular and Mediastinum    Hyperlipidemia     Continue current medications- reviewed last lab work   Update next ov          Benign  essential hypertension     bp is normal - continue current medications             Respiratory    Obstructive sleep apnea     Wearing cpap             Endocrine    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 - Primary     Blood sugar and 90 day average sugar reviewed  Results for orders placed or performed in visit on 05/04/18   POC Glycosylated Hemoglobin (Hb A1C)   Result Value Ref Range    Hemoglobin A1C 7.2 %   POC Glucose Fingerstick   Result Value Ref Range    Glucose 159 (A) 70 - 130 mg/dL     Is utd with eye exam - Dr Fink  No foot lesion but risk reviewed due to hammertoes, heel callus   Ur alb neg 10/18  Good glycemic awaredness  Nosebleeds- stopped asa   Commended improvement in average sugar   She is wearing cpap nightly   No changes for now- recheck 3-4 months          Relevant Orders    POC Glycosylated Hemoglobin (Hb A1C) (Completed)    POC Glucose Fingerstick (Completed)      Other Visit Diagnoses    None.       Return in about 3 months (around 8/4/2018) for Recheck 30 min .    Renetta Rosario MA  Signed Maria Eugenia Lawrence MD

## 2018-05-31 RX ORDER — ATORVASTATIN CALCIUM 10 MG/1
TABLET, FILM COATED ORAL
Qty: 90 TABLET | Refills: 0 | Status: SHIPPED | OUTPATIENT
Start: 2018-05-31 | End: 2018-09-09 | Stop reason: SDUPTHER

## 2018-06-15 RX ORDER — INSULIN LISPRO 100 [IU]/ML
INJECTION, SOLUTION INTRAVENOUS; SUBCUTANEOUS
Qty: 3 PEN | Refills: 3 | Status: SHIPPED | OUTPATIENT
Start: 2018-06-15 | End: 2018-12-27 | Stop reason: SDUPTHER

## 2018-06-25 RX ORDER — INSULIN GLARGINE 100 [IU]/ML
INJECTION, SOLUTION SUBCUTANEOUS
Qty: 15 PEN | Refills: 0 | Status: SHIPPED | OUTPATIENT
Start: 2018-06-25 | End: 2018-08-20 | Stop reason: SDUPTHER

## 2018-07-06 RX ORDER — AMLODIPINE BESYLATE 5 MG/1
TABLET ORAL
Qty: 30 TABLET | Refills: 1 | Status: SHIPPED | OUTPATIENT
Start: 2018-07-06 | End: 2018-09-09 | Stop reason: SDUPTHER

## 2018-07-30 RX ORDER — GLIMEPIRIDE 4 MG/1
TABLET ORAL
Qty: 90 TABLET | Refills: 0 | Status: SHIPPED | OUTPATIENT
Start: 2018-07-30 | End: 2018-10-29 | Stop reason: SDUPTHER

## 2018-08-12 RX ORDER — POTASSIUM CHLORIDE 750 MG/1
TABLET, EXTENDED RELEASE ORAL
Qty: 90 TABLET | Refills: 0 | Status: SHIPPED | OUTPATIENT
Start: 2018-08-12 | End: 2018-11-08 | Stop reason: SDUPTHER

## 2018-08-20 NOTE — TELEPHONE ENCOUNTER
PT CALLED AND STATED SHE CALLED SEVERAL DAYS AGO REGARDING REFILL OF LANTUS; SHE IS DOWN TO HER LAST DOSE AND NEEDS IT CALLED IN TODAY AT [facility] [phone]

## 2018-08-30 RX ORDER — LOSARTAN POTASSIUM AND HYDROCHLOROTHIAZIDE 12.5; 1 MG/1; MG/1
TABLET ORAL
Qty: 90 TABLET | Refills: 0 | Status: SHIPPED | OUTPATIENT
Start: 2018-08-30 | End: 2018-11-27 | Stop reason: SDUPTHER

## 2018-09-10 RX ORDER — AMLODIPINE BESYLATE 5 MG/1
TABLET ORAL
Qty: 30 TABLET | Refills: 0 | Status: SHIPPED | OUTPATIENT
Start: 2018-09-10 | End: 2018-12-27 | Stop reason: SDUPTHER

## 2018-09-10 RX ORDER — ATORVASTATIN CALCIUM 10 MG/1
TABLET, FILM COATED ORAL
Qty: 90 TABLET | Refills: 0 | Status: SHIPPED | OUTPATIENT
Start: 2018-09-10 | End: 2018-11-27 | Stop reason: SDUPTHER

## 2018-09-14 ENCOUNTER — OFFICE VISIT (OUTPATIENT)
Dept: ENDOCRINOLOGY | Facility: CLINIC | Age: 73
End: 2018-09-14

## 2018-09-14 VITALS
SYSTOLIC BLOOD PRESSURE: 138 MMHG | OXYGEN SATURATION: 99 % | DIASTOLIC BLOOD PRESSURE: 70 MMHG | HEIGHT: 65 IN | HEART RATE: 75 BPM | WEIGHT: 181 LBS | BODY MASS INDEX: 30.16 KG/M2

## 2018-09-14 DIAGNOSIS — E10.42 TYPE 1 DIABETES MELLITUS WITH DIABETIC POLYNEUROPATHY (HCC): Primary | ICD-10-CM

## 2018-09-14 DIAGNOSIS — E78.00 PURE HYPERCHOLESTEROLEMIA: ICD-10-CM

## 2018-09-14 DIAGNOSIS — E87.6 HYPOKALEMIA: ICD-10-CM

## 2018-09-14 DIAGNOSIS — R79.89 ABNORMAL LIVER FUNCTION TESTS: ICD-10-CM

## 2018-09-14 DIAGNOSIS — I10 BENIGN ESSENTIAL HYPERTENSION: ICD-10-CM

## 2018-09-14 DIAGNOSIS — E55.9 VITAMIN D DEFICIENCY: ICD-10-CM

## 2018-09-14 LAB
ALBUMIN SERPL-MCNC: 4.71 G/DL (ref 3.2–4.8)
ALBUMIN/GLOB SERPL: 2.6 G/DL (ref 1.5–2.5)
ALP SERPL-CCNC: 91 U/L (ref 25–100)
ALT SERPL W P-5'-P-CCNC: 30 U/L (ref 7–40)
ANION GAP SERPL CALCULATED.3IONS-SCNC: 8 MMOL/L (ref 3–11)
ARTICHOKE IGE QN: 87 MG/DL (ref 0–130)
AST SERPL-CCNC: 24 U/L (ref 0–33)
BILIRUB SERPL-MCNC: 0.4 MG/DL (ref 0.3–1.2)
BUN BLD-MCNC: 25 MG/DL (ref 9–23)
BUN/CREAT SERPL: 22.3 (ref 7–25)
CALCIUM SPEC-SCNC: 9.6 MG/DL (ref 8.7–10.4)
CHLORIDE SERPL-SCNC: 104 MMOL/L (ref 99–109)
CHOLEST SERPL-MCNC: 149 MG/DL (ref 0–200)
CO2 SERPL-SCNC: 27 MMOL/L (ref 20–31)
CREAT BLD-MCNC: 1.12 MG/DL (ref 0.6–1.3)
GFR SERPL CREATININE-BSD FRML MDRD: 48 ML/MIN/1.73
GLOBULIN UR ELPH-MCNC: 1.8 GM/DL
GLUCOSE BLD-MCNC: 146 MG/DL (ref 70–100)
GLUCOSE BLDC GLUCOMTR-MCNC: 180 MG/DL (ref 70–130)
HBA1C MFR BLD: 7.2 %
HDLC SERPL-MCNC: 47 MG/DL (ref 40–60)
POTASSIUM BLD-SCNC: 3.8 MMOL/L (ref 3.5–5.5)
PROT SERPL-MCNC: 6.5 G/DL (ref 5.7–8.2)
SODIUM BLD-SCNC: 139 MMOL/L (ref 132–146)
TRIGL SERPL-MCNC: 132 MG/DL (ref 0–150)
TSH SERPL DL<=0.05 MIU/L-ACNC: 2.13 MIU/ML (ref 0.35–5.35)

## 2018-09-14 PROCEDURE — 82947 ASSAY GLUCOSE BLOOD QUANT: CPT | Performed by: INTERNAL MEDICINE

## 2018-09-14 PROCEDURE — 80053 COMPREHEN METABOLIC PANEL: CPT | Performed by: INTERNAL MEDICINE

## 2018-09-14 PROCEDURE — 80061 LIPID PANEL: CPT | Performed by: INTERNAL MEDICINE

## 2018-09-14 PROCEDURE — 99214 OFFICE O/P EST MOD 30 MIN: CPT | Performed by: INTERNAL MEDICINE

## 2018-09-14 PROCEDURE — 83036 HEMOGLOBIN GLYCOSYLATED A1C: CPT | Performed by: INTERNAL MEDICINE

## 2018-09-14 PROCEDURE — 84443 ASSAY THYROID STIM HORMONE: CPT | Performed by: INTERNAL MEDICINE

## 2018-09-14 RX ORDER — ZINC GLUCONATE 50 MG
TABLET ORAL
COMMUNITY

## 2018-09-14 RX ORDER — AMLODIPINE BESYLATE 5 MG/1
TABLET ORAL
COMMUNITY
End: 2018-12-27 | Stop reason: SDUPTHER

## 2018-09-14 NOTE — PROGRESS NOTES
Chinyere Olvera 73 y.o.  CC:Follow-up; Diabetes (Type II, eye exam was 4/18 Dr Fink); Hyperlipidemia; and Hypothyroidism    [place]: Follow-up; Diabetes (Type II, eye exam was 4/18 Dr Fink); Hyperlipidemia; and Hypothyroidism    Blood sugar and 90 day average sugar reviewed  Results for orders placed or performed in visit on 09/14/18   POC Glycosylated Hemoglobin (Hb A1C)   Result Value Ref Range    Hemoglobin A1C 7.2 %   POC Glucose Fingerstick   Result Value Ref Range    Glucose 180 (A) 70 - 130 mg/dL   is on amaryl, lantus and humalog   Is on low fat diet   bp is slightly high- is on lopressor and norvasc, hyzaar   Had a lot of traffic coming - stress  Is utd with eye exam   No neuropathy- foot care reviewed- high risk with aashish  bp recheck is still 138- discussed low salt diet and she is going to start riding bicycle    Allergies   Allergen Reactions   • Penicillins Rash       Current Outpatient Prescriptions:   •  amLODIPine (NORVASC) 5 MG tablet, TAKE ONE TABLET BY MOUTH DAILY, Disp: 30 tablet, Rfl: 0  •  atorvastatin (LIPITOR) 10 MG tablet, TAKE ONE TABLET BY MOUTH DAILY, Disp: 90 tablet, Rfl: 0  •  Blood Glucose Monitoring Suppl (ONE TOUCH ULTRA 2) W/DEVICE kit, Use daily to test blood sugars  Dx:  E11.9, Disp: 1 each, Rfl: 0  •  buPROPion XL (WELLBUTRIN XL) 150 MG 24 hr tablet, , Disp: , Rfl:   •  Ferrous Fumarate (IRON) 18 MG tablet controlled-release, Take  by mouth daily., Disp: , Rfl:   •  glimepiride (AMARYL) 4 MG tablet, TAKE ONE TABLET BY MOUTH DAILY, Disp: 90 tablet, Rfl: 0  •  HUMALOG KWIKPEN 100 UNIT/ML solution pen-injector, INJECT 10 UNITS UNDER THE SKIN THREE TIMES A DAY WITH MEALS OR AS DIRECTED, Disp: 3 pen, Rfl: 3  •  Insulin Glargine (LANTUS SOLOSTAR) 100 UNIT/ML injection pen, Inject 30 Units under the skin into the appropriate area as directed Every Night. (Patient taking differently: Inject 28 Units under the skin into the appropriate area as directed Every Night.), Disp: 15 pen,  "Rfl: 0  •  Insulin Pen Needle (B-D ULTRAFINE III SHORT PEN) 31G X 8 MM misc, Use QID to inject insulin, Disp: 400 each, Rfl: 3  •  Insulin Syringe-Needle U-100 (BD INSULIN SYRINGE ULTRAFINE) 31G X 5/16\" 0.5 ML misc, , Disp: , Rfl:   •  lansoprazole (PREVACID) 30 MG capsule, Take  by mouth., Disp: , Rfl:   •  losartan-hydrochlorothiazide (HYZAAR) 100-12.5 MG per tablet, TAKE ONE TABLET BY MOUTH DAILY, Disp: 90 tablet, Rfl: 0  •  metoprolol tartrate (LOPRESSOR) 25 MG tablet, Take 0.25 tablets by mouth 2 (two) times a day., Disp: , Rfl:   •  Omega-3 Fatty Acids (FISH OIL) 1000 MG capsule capsule, Take  by mouth daily., Disp: , Rfl:   •  ONE TOUCH ULTRA TEST test strip, USE 1 STRIP TO TEST BLOOD GLUCOSE THREE TIMES A DAY, Disp: 100 each, Rfl: 10  •  ONETOUCH DELICA LANCETS 33G misc, , Disp: , Rfl:   •  potassium chloride (K-DUR,KLOR-CON) 10 MEQ CR tablet, TAKE ONE TABLET BY MOUTH DAILY WITH FOOD, Disp: 90 tablet, Rfl: 0  Patient Active Problem List    Diagnosis   • Obstructive sleep apnea [G47.33]   • Peripheral neuropathy, idiopathic [G60.9]   • Abnormal liver function tests [R79.89]     Overview Note:     Impression: 11/12/2015 - check cmp  Impression: 03/24/2015 - check cmp  Impression: 08/29/2014 - reviewed last lab work - good results  Impression: 05/09/2014 - update; Description: viral- resolved 3/13  normal hepatic u/s 3/13     • Anemia [D64.9]     Overview Note:     Impression: 03/24/2015 - check cbc  Impression: 11/28/2014 - check cbc;      • Benign essential hypertension [I10]     Overview Note:     Impression: 02/26/2016 - bp high- titrate medication  Impression: 11/12/2015 - bp is good  Impression: 06/25/2015 - bp is good  Impression: 03/24/2015 - bp is good  Impression: 11/28/2014 - bp is good  Impression: 08/29/2014 - bp is good   continue to monitor  Impression: 05/09/2014 - bp is good  Impression: 02/05/2014 - Blood pressure is good, continue to monitor and continue current medication.; Description: " normal renal u/s, no evidence of renal artery stenosis 3/13     • 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 (CMS/[facility]) [E10.49]     Overview Note:     Impression: 02/26/2016 - blood sugar 113, hgn a1c 7.4%   is up to date with eye exam    neuropathy stable - callus better   ur alb  - titrate losartan  Impression: 11/12/2015 - blood sugar is 136, hgn a1c 6.6%  doing well overall   is up to date with preventive care   bp is good   is stepping up diet and exercise, discussed reducing glimepiride by 1/2  Impression: 06/25/2015 - blood sugar is 151, hgn a1c 6.9%  average sugar is 150  is up to date with eye exam  ur alb 3/15 - on arb  neuropathy and hammertoe with callus stable  f/u 4 months or prn  Impression: 03/24/2015 - blood sugar 147, hgn a1c 7.3% (average 160)  is up to date with eye exam  neuropathy stable- healed blister right gt toe  ur alb due  Impression: 11/28/2014 - blood sugar and hgn a1c both in range- reviewed blood sugar average 6.8% (average 140)  is not haviing significant lows  is up to date with preventive care- commended efforts  will f/u when back from [place]  Impression: 08/29/2014 - blood sugar 207, hgn a1c 6.9%  average 150  is up to date with preventive care  good shoes for foot/risk of callus and ulcer discussed  f/u 3-4 months, no change in medication currently  Impression: 05/09/2014 - blood sugar is 256, hgn a1c 6.7% (good control overall)  is up to date on eye exam, no neuropathy but foot deformity and hammertoe - discussed [employer] for better foot wear  f/u 3-4 months  Impression: 02/05/2014 - II Diabetes Mellitus- blood sugars are better overall.  current blood sugar is 129, hgn a1c 7.4 (up from 6.9).  Is not having low blood sugars, is up to date on eye exam, no neuropathy.  Has nephropathy, bp is good and she is on an ace/arb.  Continue to monitor blood sugars and watch diet, stay active.  Discussed foot care and the need for her to get arch  supports due to high arch.;      • Gastroesophageal reflux disease [K21.9]   • Hyperlipidemia [E78.5]     Overview Note:     Impression: 02/26/2016 - good ldl last check  Impression: 11/12/2015 - check flp  Impression: 06/25/2015 - recent flp good  Impression: 03/24/2015 - check flp  Impression: 11/28/2014 - check flp  Impression: 08/29/2014 - reviewed last lab work, update next ov  good results last visit  Impression: 05/09/2014 - check flp  Impression: 02/05/2014 - good lipid control on lipitor. Update next visit.;      • Hypokalemia [E87.6]   • Calculus of kidney [N20.0]   • Cyst of ovary [N83.209]     Overview Note:     Description: calcification 2/13- by u/s 0.92x0.95cm  thought to be calcified cyst     • Renal insufficiency [N28.9]     Overview Note:     Impression: 11/12/2015 - check cmp  Impression: 06/25/2015 - improved function overall  Impression: 03/24/2015 - check cmp  Impression: 11/28/2014 - stable last check- update today  Impression: 08/29/2014 - stable function;      • Solitary pulmonary nodule [R91.1]     Overview Note:     Description: [facility] CAT scan 2/13 stable repeat yearly     • Vitamin D deficiency [E55.9]     Overview Note:     Impression: 11/12/2015 - last check good   f/u yearly  Impression: 06/25/2015 - on supplement  Impression: 03/24/2015 - check vitamin D level  Impression: 11/28/2014 - update levels;      • Abnormal weight loss [R63.4]     Review of Systems   Constitutional: Negative for activity change, appetite change, chills, diaphoresis, fatigue, fever and unexpected weight change.   HENT: Negative for congestion, dental problem, drooling, ear discharge, ear pain, facial swelling, hearing loss, mouth sores, nosebleeds, postnasal drip, rhinorrhea, sinus pressure, sneezing, sore throat, tinnitus, trouble swallowing and voice change.    Eyes: Negative for photophobia, pain, discharge, redness, itching and visual disturbance.   Respiratory: Negative for apnea, cough, choking,  chest tightness, shortness of breath, wheezing and stridor.    Cardiovascular: Negative for chest pain, palpitations and leg swelling.   Gastrointestinal: Negative for abdominal distention, abdominal pain, anal bleeding, blood in stool, constipation, diarrhea, nausea, rectal pain and vomiting.   Endocrine: Negative for cold intolerance, heat intolerance, polydipsia, polyphagia and polyuria.   Genitourinary: Negative for decreased urine volume, difficulty urinating, dysuria, enuresis, flank pain, frequency, genital sores, hematuria and urgency.   Musculoskeletal: Negative for arthralgias, back pain, gait problem, joint swelling, myalgias, neck pain and neck stiffness.   Skin: Negative for color change, pallor, rash and wound.   Allergic/Immunologic: Negative for environmental allergies, food allergies and immunocompromised state.   Neurological: Negative for dizziness, tremors, seizures, syncope, facial asymmetry, speech difficulty, weakness, light-headedness, numbness and headaches.   Hematological: Negative for adenopathy. Does not bruise/bleed easily.   Psychiatric/Behavioral: Negative for agitation, behavioral problems, confusion, decreased concentration, dysphoric mood, hallucinations, self-injury, sleep disturbance and suicidal ideas. The patient is not nervous/anxious and is not hyperactive.      Social History     Social History   • Marital status:      Spouse name: N/A   • Number of children: N/A   • Years of education: N/A     Occupational History   • Not on file.     Social History Main Topics   • Smoking status: Former Smoker   • Smokeless tobacco: Never Used   • Alcohol use 4.2 oz/week     7 Glasses of wine per week   • Drug use: No   • Sexual activity: Defer     Other Topics Concern   • Not on file     Social History Narrative   • No narrative on file     Family History   Problem Relation Age of Onset   • Diabetes Father    • Diabetes Maternal Grandfather    • Coronary artery disease Other   "    /74   Pulse 75   Ht 165.1 cm (65\")   Wt 82.1 kg (181 lb)   SpO2 99%   BMI 30.12 kg/m²   Physical Exam   Constitutional: She is oriented to person, place, and time. She appears well-developed and well-nourished.   HENT:   Head: Normocephalic and atraumatic.   Nose: Nose normal.   Mouth/Throat: Oropharynx is clear and moist.   Eyes: Pupils are equal, round, and reactive to light. Conjunctivae, EOM and lids are normal.   Neck: Trachea normal and normal range of motion. Neck supple. Carotid bruit is not present. No tracheal deviation present. No thyroid mass and no thyromegaly present.   Cardiovascular: Normal rate, regular rhythm, normal heart sounds and intact distal pulses.  Exam reveals no gallop and no friction rub.    No murmur heard.  Pulmonary/Chest: Effort normal and breath sounds normal. No respiratory distress. She has no wheezes.   Musculoskeletal: Normal range of motion. She exhibits no edema or deformity.   Lymphadenopathy:     She has no cervical adenopathy.   Neurological: She is alert and oriented to person, place, and time. She has normal reflexes. She displays normal reflexes. No cranial nerve deficit.   Skin: Skin is warm and dry. No rash noted. No cyanosis or erythema. Nails show no clubbing.   Psychiatric: She has a normal mood and affect. Her speech is normal and behavior is normal. Judgment and thought content normal. Cognition and memory are normal.   Nursing note and vitals reviewed.    Results for orders placed or performed in visit on 05/04/18   POC Glycosylated Hemoglobin (Hb A1C)   Result Value Ref Range    Hemoglobin A1C 7.2 %   POC Glucose Fingerstick   Result Value Ref Range    Glucose 159 (A) 70 - 130 mg/dL     Chinyere was seen today for follow-up, diabetes, hyperlipidemia and hypothyroidism.    Diagnoses and all orders for this visit:    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 (CMS/[facility])  -     POC Glycosylated Hemoglobin (Hb A1C)  -     POC Glucose " Fingerstick      Problem List Items Addressed This Visit        Cardiovascular and Mediastinum    Hyperlipidemia     Is on lipitor and low fat diet   Check flp          Benign essential hypertension     bp is good overall  Recheck improved  Encouraged her to monitor at home and call if over 135/85  Is on arb and potassium             Digestive    Vitamin D deficiency     Continue supplement - update levels yearly             Endocrine    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) - Primary     Blood sugar and 90 day average sugar reviewed  Results for orders placed or performed in visit on 09/14/18   POC Glycosylated Hemoglobin (Hb A1C)   Result Value Ref Range    Hemoglobin A1C 7.2 %   POC Glucose Fingerstick   Result Value Ref Range    Glucose 180 (A) 70 - 130 mg/dL     Is utd with eye exam  No foot ulcer- does have hammertoe second left toe  Ur alb due today   Goal average sugar 150 or less discussed  Rational for goal and risk of higher sugar reviewed  F/u 3-4 months or sooner as needed          Relevant Orders    POC Glycosylated Hemoglobin (Hb A1C) (Completed)    POC Glucose Fingerstick (Completed)    Comprehensive Metabolic Panel (Completed)    Lipid Panel (Completed)    Microalbumin / Creatinine Urine Ratio - Urine, Clean Catch    TSH (Completed)       Other    Hypokalemia     On oral supplement- update levels          Abnormal liver function tests     Check cmp              Return in about 3 months (around 12/14/2018) for Recheck 30 min .    Renetta Rosario MA  Signed Maria Eugenia Lawrence MD

## 2018-09-14 NOTE — ASSESSMENT & PLAN NOTE
bp is good overall  Recheck improved  Encouraged her to monitor at home and call if over 135/85  Is on arb and potassium

## 2018-09-14 NOTE — ASSESSMENT & PLAN NOTE
Blood sugar and 90 day average sugar reviewed  Results for orders placed or performed in visit on 09/14/18   POC Glycosylated Hemoglobin (Hb A1C)   Result Value Ref Range    Hemoglobin A1C 7.2 %   POC Glucose Fingerstick   Result Value Ref Range    Glucose 180 (A) 70 - 130 mg/dL     Is utd with eye exam  No foot ulcer- does have hammertoe second left toe  Ur alb due today   Goal average sugar 150 or less discussed  Rational for goal and risk of higher sugar reviewed  F/u 3-4 months or sooner as needed

## 2018-10-03 RX ORDER — AMLODIPINE BESYLATE 5 MG/1
TABLET ORAL
Qty: 30 TABLET | Refills: 2 | Status: SHIPPED | OUTPATIENT
Start: 2018-10-03 | End: 2018-12-27 | Stop reason: SDUPTHER

## 2018-10-29 RX ORDER — GLIMEPIRIDE 4 MG/1
TABLET ORAL
Qty: 90 TABLET | Refills: 0 | Status: SHIPPED | OUTPATIENT
Start: 2018-10-29 | End: 2019-01-28 | Stop reason: SDUPTHER

## 2018-11-08 RX ORDER — POTASSIUM CHLORIDE 750 MG/1
TABLET, EXTENDED RELEASE ORAL
Qty: 90 TABLET | Refills: 0 | Status: SHIPPED | OUTPATIENT
Start: 2018-11-08 | End: 2019-02-19 | Stop reason: SDUPTHER

## 2018-11-27 RX ORDER — ATORVASTATIN CALCIUM 10 MG/1
TABLET, FILM COATED ORAL
Qty: 90 TABLET | Refills: 0 | Status: SHIPPED | OUTPATIENT
Start: 2018-11-27 | End: 2019-03-18 | Stop reason: SDUPTHER

## 2018-11-27 RX ORDER — LOSARTAN POTASSIUM AND HYDROCHLOROTHIAZIDE 12.5; 1 MG/1; MG/1
TABLET ORAL
Qty: 90 TABLET | Refills: 0 | Status: SHIPPED | OUTPATIENT
Start: 2018-11-27 | End: 2019-02-19 | Stop reason: SDUPTHER

## 2018-12-13 ENCOUNTER — TELEPHONE (OUTPATIENT)
Dept: INTERNAL MEDICINE | Facility: CLINIC | Age: 73
End: 2018-12-13

## 2018-12-13 NOTE — TELEPHONE ENCOUNTER
DAN WITH [employer] IS CALLING AT THE REQUEST OF PATIENTS MEDICARE PLAN TO REVIEW PATIENTS MEDICATIONS AND SEE WHAT IS CURRENT AND REVIEW INFORMATION WITH US. HE WOULD LIKE A CALL BACK [phone]. PHONE CALL WILL ONLY TAKE A FEW MINUTES TO DISCUSS AND REVIEW MEDICATIONS.

## 2018-12-13 NOTE — TELEPHONE ENCOUNTER
PATIENT CALLED TO GET INFORMATION ON A DIABETIC MONITOR AND WOULD LIKE TO GET A CALL BACK FROM DR LE OR HER NURSE ION REGARDS TO THIS AND WOULD LIKE TO GET A CALL BACK [phone]

## 2018-12-14 NOTE — TELEPHONE ENCOUNTER
Patient states she has not given her insurance company or anyone authorization to call here to discuss her medications so pt was advised I will not return this call to Ti

## 2018-12-14 NOTE — TELEPHONE ENCOUNTER
Patient states she is trying to get a DexCom sensor device and was advised she will need to send in her blood sugar readings  She was advised to document the last month's results with testing 4 times per day so it can be forwarded to [employer]  Pt voiced understanding and will start documenting the numbers and will send them in

## 2018-12-27 RX ORDER — INSULIN LISPRO 100 [IU]/ML
INJECTION, SOLUTION INTRAVENOUS; SUBCUTANEOUS
Qty: 3 ML | Refills: 2 | Status: SHIPPED | OUTPATIENT
Start: 2018-12-27 | End: 2019-01-14 | Stop reason: CLARIF

## 2018-12-27 RX ORDER — AMLODIPINE BESYLATE 5 MG/1
TABLET ORAL
Qty: 90 TABLET | Refills: 1 | Status: SHIPPED | OUTPATIENT
Start: 2018-12-27 | End: 2019-05-09 | Stop reason: SDUPTHER

## 2018-12-29 NOTE — TELEPHONE ENCOUNTER
Were these refilled?  I cant tell- patient is within protocol so ok to refill if not.  Thanks, [facility]

## 2019-01-14 ENCOUNTER — OFFICE VISIT (OUTPATIENT)
Dept: ENDOCRINOLOGY | Facility: CLINIC | Age: 74
End: 2019-01-14

## 2019-01-14 VITALS
OXYGEN SATURATION: 99 % | HEART RATE: 69 BPM | HEIGHT: 65 IN | DIASTOLIC BLOOD PRESSURE: 64 MMHG | BODY MASS INDEX: 30.16 KG/M2 | SYSTOLIC BLOOD PRESSURE: 128 MMHG | WEIGHT: 181 LBS

## 2019-01-14 DIAGNOSIS — E78.00 PURE HYPERCHOLESTEROLEMIA: ICD-10-CM

## 2019-01-14 DIAGNOSIS — I10 BENIGN ESSENTIAL HYPERTENSION: ICD-10-CM

## 2019-01-14 DIAGNOSIS — E55.9 VITAMIN D DEFICIENCY: ICD-10-CM

## 2019-01-14 DIAGNOSIS — E10.42 TYPE 1 DIABETES MELLITUS WITH DIABETIC POLYNEUROPATHY (HCC): Primary | ICD-10-CM

## 2019-01-14 LAB
GLUCOSE BLDC GLUCOMTR-MCNC: 172 MG/DL (ref 70–130)
HBA1C MFR BLD: 7.2 %

## 2019-01-14 PROCEDURE — 99214 OFFICE O/P EST MOD 30 MIN: CPT | Performed by: INTERNAL MEDICINE

## 2019-01-14 PROCEDURE — 83036 HEMOGLOBIN GLYCOSYLATED A1C: CPT | Performed by: INTERNAL MEDICINE

## 2019-01-14 PROCEDURE — 82947 ASSAY GLUCOSE BLOOD QUANT: CPT | Performed by: INTERNAL MEDICINE

## 2019-01-14 NOTE — ASSESSMENT & PLAN NOTE
Blood sugar and 90 day average sugar reviewed  Results for orders placed or performed in visit on 01/14/19   POC Glycosylated Hemoglobin (Hb A1C)   Result Value Ref Range    Hemoglobin A1C 7.2 %   POC Glucose Fingerstick   Result Value Ref Range    Glucose 172 (A) 70 - 130 mg/dL     Average sugar is 150   Is utd with eye exam  No neuropathy - no foot ulcer  Does have callus   Ur alb neg 9/18   Reviewed lab work   F/u 3-4 months

## 2019-01-14 NOTE — PROGRESS NOTES
Chinyere Olvera 73 y.o.  [facility]:Follow-up; Diabetes (Type II, eye exam was 4/18 Dr Fink); Hyperlipidemia; and Hypothyroidism      [place]: Follow-up; Diabetes (Type II, eye exam was 4/18 Dr Fink); Hyperlipidemia; and Hypothyroidism    Blood sugar and 90 day average sugar reviewed  Results for orders placed or performed in visit on 01/14/19   POC Glycosylated Hemoglobin (Hb A1C)   Result Value Ref Range    Hemoglobin A1C 7.2 %   POC Glucose Fingerstick   Result Value Ref Range    Glucose 172 (A) 70 - 130 mg/dL     Average sugar is 150   Is on low fat diet and lipitor   Energy is good   bp is good   Is planning to start walking with her sister in law  Discussed sugar and average, goals reviewed  Is utd with eye exam  No neuropathy, callus or ulcer   dropping around lunch time   Discussed taking less novolog with breakfast and before exercising   Ur alb neg 10/17 due with next lab draw    Allergies   Allergen Reactions   • Penicillins Rash       Current Outpatient Medications:   •  amLODIPine (NORVASC) 5 MG tablet, TAKE ONE TABLET BY MOUTH DAILY, Disp: 90 tablet, Rfl: 1  •  atorvastatin (LIPITOR) 10 MG tablet, TAKE ONE TABLET BY MOUTH DAILY, Disp: 90 tablet, Rfl: 0  •  Blood Glucose Monitoring Suppl (ONE TOUCH ULTRA 2) W/DEVICE kit, Use daily to test blood sugars  Dx:  E11.9, Disp: 1 each, Rfl: 0  •  buPROPion XL (WELLBUTRIN XL) 150 MG 24 hr tablet, , Disp: , Rfl:   •  Ferrous Fumarate (IRON) 18 MG tablet controlled-release, Take  by mouth daily., Disp: , Rfl:   •  glimepiride (AMARYL) 4 MG tablet, TAKE ONE TABLET BY MOUTH DAILY, Disp: 90 tablet, Rfl: 0  •  insulin aspart (NOVOLOG FLEXPEN) 100 UNIT/ML solution pen-injector sc pen, Inject 10 Units under the skin into the appropriate area as directed 3 (Three) Times a Day With Meals., Disp: 30 mL, Rfl: 3  •  Insulin Glargine (LANTUS SOLOSTAR) 100 UNIT/ML injection pen, INJECT 30 UNITS UNDER THE SKIN UNDER THE SKIN ONCE NIGHTLY, Disp: 15 mL, Rfl: 0  •  Insulin Pen Needle  "(B-D ULTRAFINE III SHORT PEN) 31G X 8 MM misc, Use QID to inject insulin, Disp: 400 each, Rfl: 3  •  Insulin Syringe-Needle U-100 (BD INSULIN SYRINGE ULTRAFINE) 31G X 5/16\" 0.5 ML misc, , Disp: , Rfl:   •  lansoprazole (PREVACID) 30 MG capsule, Take  by mouth., Disp: , Rfl:   •  losartan-hydrochlorothiazide (HYZAAR) 100-12.5 MG per tablet, TAKE ONE TABLET BY MOUTH DAILY, Disp: 90 tablet, Rfl: 0  •  metoprolol tartrate (LOPRESSOR) 25 MG tablet, Take 0.25 tablets by mouth 2 (two) times a day., Disp: , Rfl:   •  Omega-3 Fatty Acids (FISH OIL) 1000 MG capsule capsule, Take  by mouth daily., Disp: , Rfl:   •  ONE TOUCH ULTRA TEST test strip, USE 1 STRIP TO TEST BLOOD GLUCOSE THREE TIMES A DAY, Disp: 100 each, Rfl: 10  •  ONETOUCH DELICA LANCETS 33G misc, , Disp: , Rfl:   •  potassium chloride (K-DUR,KLOR-CON) 10 MEQ CR tablet, TAKE ONE TABLET BY MOUTH DAILY WITH FOOD, Disp: 90 tablet, Rfl: 0  •  zinc gluconate 50 MG tablet, zinc gluconate 50 mg tablet  Daily, Disp: , Rfl:   Patient Active Problem List    Diagnosis   • Preoperative evaluation to rule out surgical contraindication [Z01.818]   • Obstructive sleep apnea syndrome [G47.33]   • Peripheral neuropathy, idiopathic [G60.9]   • Stress fracture of foot [M84.376A]   • Foot pain [M79.673]   • Pain in left foot [M79.672]   • Abnormal liver function tests [R94.5]   • Anemia [D64.9]   • Benign essential hypertension [I10]   • 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   HENT: Negative for congestion, " dental problem, drooling, ear discharge, ear pain, facial swelling, hearing loss, mouth sores, nosebleeds, postnasal drip, rhinorrhea, sinus pressure, sneezing, sore throat, tinnitus, trouble swallowing and voice change.    Eyes: Negative for photophobia, pain, discharge, redness, itching and visual disturbance.   Respiratory: Negative for apnea, cough, choking, chest tightness, shortness of breath, wheezing and stridor.    Cardiovascular: Negative for chest pain, palpitations and leg swelling.   Gastrointestinal: Negative for abdominal distention, abdominal pain, anal bleeding, blood in stool, constipation, diarrhea, nausea, rectal pain and vomiting.   Endocrine: Negative for cold intolerance, heat intolerance, polydipsia, polyphagia and polyuria.   Genitourinary: Negative for decreased urine volume, difficulty urinating, dysuria, enuresis, flank pain, frequency, genital sores, hematuria and urgency.   Musculoskeletal: Negative for arthralgias, back pain, gait problem, joint swelling, myalgias, neck pain and neck stiffness.   Skin: Negative for color change, pallor, rash and wound.   Allergic/Immunologic: Negative for environmental allergies, food allergies and immunocompromised state.   Neurological: Negative for dizziness, tremors, seizures, syncope, facial asymmetry, speech difficulty, weakness, light-headedness, numbness and headaches.   Hematological: Negative for adenopathy. Does not bruise/bleed easily.   Psychiatric/Behavioral: Negative for agitation, behavioral problems, confusion, decreased concentration, dysphoric mood, hallucinations, self-injury, sleep disturbance and suicidal ideas. The patient is not nervous/anxious and is not hyperactive.      Social History     Socioeconomic History   • Marital status:      Spouse name: Not on file   • Number of children: Not on file   • Years of education: Not on file   • Highest education level: Not on file   Social Needs   • Financial resource strain: Not  "on file   • Food insecurity - worry: Not on file   • Food insecurity - inability: Not on file   • Transportation needs - medical: Not on file   • Transportation needs - non-medical: Not on file   Occupational History   • Not on file   Tobacco Use   • Smoking status: Former Smoker   • Smokeless tobacco: Never Used   Substance and Sexual Activity   • Alcohol use: Yes     Alcohol/week: 4.2 oz     Types: 7 Glasses of wine per week   • Drug use: No   • Sexual activity: Defer   Other Topics Concern   • Not on file   Social History Narrative   • Not on file     Family History   Problem Relation Age of Onset   • Diabetes Father    • Diabetes Maternal Grandfather    • Coronary artery disease Other      /64   Pulse 69   Ht 165.1 cm (65\")   Wt 82.1 kg (181 lb)   SpO2 99%   Breastfeeding? No   BMI 30.12 kg/m²   Physical Exam   Constitutional: She is oriented to person, place, and time. She appears well-developed and well-nourished.   HENT:   Head: Normocephalic and atraumatic.   Nose: Nose normal.   Mouth/Throat: Oropharynx is clear and moist.   Eyes: Conjunctivae, EOM and lids are normal. Pupils are equal, round, and reactive to light.   Neck: Trachea normal and normal range of motion. Neck supple. Carotid bruit is not present. No tracheal deviation present. No thyroid mass and no thyromegaly present.   Cardiovascular: Normal rate, regular rhythm, normal heart sounds and intact distal pulses. Exam reveals no gallop and no friction rub.   No murmur heard.  Pulmonary/Chest: Effort normal and breath sounds normal. No respiratory distress. She has no wheezes.   Musculoskeletal: Normal range of motion. She exhibits no edema or deformity.    Chinyere had a diabetic foot exam performed today.   During the foot exam she had a monofilament test performed.    Neurological Sensory Findings - Unaltered hot/cold right ankle/foot discrimination and unaltered hot/cold left ankle/foot discrimination. Unaltered sharp/dull right " ankle/foot discrimination and unaltered sharp/dull left ankle/foot discrimination. No right ankle/foot altered proprioception and no left ankle/foot altered proprioception  Vascular Status -  Her right foot exhibits normal foot vasculature  and no edema. Her left foot exhibits normal foot vasculature  and no edema.  Skin Integrity  -  Her right foot skin is intact.Her left foot skin is intact..  Lymphadenopathy:     She has no cervical adenopathy.   Neurological: She is alert and oriented to person, place, and time. She has normal reflexes. She displays normal reflexes. No cranial nerve deficit.   Skin: Skin is warm and dry. No rash noted. No cyanosis or erythema. Nails show no clubbing.   Psychiatric: She has a normal mood and affect. Her speech is normal and behavior is normal. Judgment and thought content normal. Cognition and memory are normal.   Nursing note and vitals reviewed.    Results for orders placed or performed in visit on 09/14/18   Comprehensive Metabolic Panel   Result Value Ref Range    Glucose 146 (H) 70 - 100 mg/dL    BUN 25 (H) 9 - 23 mg/dL    Creatinine 1.12 0.60 - 1.30 mg/dL    Sodium 139 132 - 146 mmol/L    Potassium 3.8 3.5 - 5.5 mmol/L    Chloride 104 99 - 109 mmol/L    CO2 27.0 20.0 - 31.0 mmol/L    Calcium 9.6 8.7 - 10.4 mg/dL    Total Protein 6.5 5.7 - 8.2 g/dL    Albumin 4.71 3.20 - 4.80 g/dL    ALT (SGPT) 30 7 - 40 U/L    AST (SGOT) 24 0 - 33 U/L    Alkaline Phosphatase 91 25 - 100 U/L    Total Bilirubin 0.4 0.3 - 1.2 mg/dL    eGFR Non African Amer 48 (L) >60 mL/min/1.73    Globulin 1.8 gm/dL    A/G Ratio 2.6 (H) 1.5 - 2.5 g/dL    BUN/Creatinine Ratio 22.3 7.0 - 25.0    Anion Gap 8.0 3.0 - 11.0 mmol/L   Lipid Panel   Result Value Ref Range    Total Cholesterol 149 0 - 200 mg/dL    Triglycerides 132 0 - 150 mg/dL    HDL Cholesterol 47 40 - 60 mg/dL    LDL Cholesterol  87 0 - 130 mg/dL   TSH   Result Value Ref Range    TSH 2.126 0.350 - 5.350 mIU/mL   POC Glycosylated Hemoglobin (Hb A1C)    Result Value Ref Range    Hemoglobin A1C 7.2 %   POC Glucose Fingerstick   Result Value Ref Range    Glucose 180 (A) 70 - 130 mg/dL     Problem List Items Addressed This Visit        Cardiovascular and Mediastinum    Hyperlipidemia     Continue low fat diet, cholesterol discussed with her          Benign essential hypertension     bp is good- continue medications/ monitoring             Digestive    Vitamin D deficiency     Continue supplement   Update levels yearly             Endocrine    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) - Primary     Blood sugar and 90 day average sugar reviewed  Results for orders placed or performed in visit on 01/14/19   POC Glycosylated Hemoglobin (Hb A1C)   Result Value Ref Range    Hemoglobin A1C 7.2 %   POC Glucose Fingerstick   Result Value Ref Range    Glucose 172 (A) 70 - 130 mg/dL     Average sugar is 150   Is utd with eye exam  No neuropathy - no foot ulcer  Does have callus   Ur alb neg 9/18   Reviewed lab work   F/u 3-4 months          Relevant Medications    insulin aspart (NOVOLOG FLEXPEN) 100 UNIT/ML solution pen-injector sc pen    Other Relevant Orders    POC Glycosylated Hemoglobin (Hb A1C) (Completed)    POC Glucose Fingerstick (Completed)        Return in about 4 months (around 5/14/2019) for Recheck 30 min .    Renetta Rosario MA  Signed Maria Eugenia Lawrence MD

## 2019-01-25 NOTE — TELEPHONE ENCOUNTER
I lmom and requested the pt call back and let me know if she wants an order sent to [facility] for the prodigy twist top lancets  At the last OV she stated she was using [facility] and not [facility] so we need verification before sending this in

## 2019-01-25 NOTE — TELEPHONE ENCOUNTER
DRAKE WITH [facility] IS CALLING ABOUT NEW PRESCRIPTION PRODIGY TWIST TOP LANCETS 28 AMANDA. THEY FAXED US A REQUEST FOR THIS NEW PRESCRIPTION ON 1/17/19 AND HAVE NOT HEARD BACK FROM US ABOUT THIS PRESCRIPTION. THEY WOULD LIKE TO CHECK THE STATUS OF THE REQUEST FOR NEW PRESCRIPTION. PHONE NUMBER [phone]

## 2019-01-28 RX ORDER — LANCETS 33 GAUGE
EACH MISCELLANEOUS
Status: CANCELLED | OUTPATIENT
Start: 2019-01-28

## 2019-01-28 RX ORDER — LANCETS 28 GAUGE
EACH MISCELLANEOUS
Qty: 400 EACH | Refills: 3 | Status: SHIPPED | OUTPATIENT
Start: 2019-01-28

## 2019-01-28 NOTE — TELEPHONE ENCOUNTER
PATIENT CALLED TO INFORM US SHE DOES WANT HER LANCETS PRESCRIPTION TO BE SENT TO [facility]. YOU CAN REACH PATIENT BACK -[phone]

## 2019-01-29 RX ORDER — GLIMEPIRIDE 4 MG/1
TABLET ORAL
Qty: 90 TABLET | Refills: 1 | Status: SHIPPED | OUTPATIENT
Start: 2019-01-29 | End: 2019-05-09 | Stop reason: SDUPTHER

## 2019-02-20 RX ORDER — LOSARTAN POTASSIUM AND HYDROCHLOROTHIAZIDE 12.5; 1 MG/1; MG/1
TABLET ORAL
Qty: 90 TABLET | Refills: 0 | Status: SHIPPED | OUTPATIENT
Start: 2019-02-20 | End: 2019-05-09 | Stop reason: SDUPTHER

## 2019-02-20 RX ORDER — POTASSIUM CHLORIDE 750 MG/1
TABLET, EXTENDED RELEASE ORAL
Qty: 90 TABLET | Refills: 0 | Status: SHIPPED | OUTPATIENT
Start: 2019-02-20 | End: 2019-05-09 | Stop reason: SDUPTHER

## 2019-03-18 DIAGNOSIS — Z79.4 TYPE 2 DIABETES MELLITUS WITHOUT COMPLICATION, WITH LONG-TERM CURRENT USE OF INSULIN (HCC): ICD-10-CM

## 2019-03-18 DIAGNOSIS — E11.9 TYPE 2 DIABETES MELLITUS WITHOUT COMPLICATION, WITH LONG-TERM CURRENT USE OF INSULIN (HCC): ICD-10-CM

## 2019-03-18 RX ORDER — ATORVASTATIN CALCIUM 10 MG/1
TABLET, FILM COATED ORAL
Qty: 90 TABLET | Refills: 0 | Status: SHIPPED | OUTPATIENT
Start: 2019-03-18 | End: 2019-05-09 | Stop reason: SDUPTHER

## 2019-04-04 DIAGNOSIS — E11.9 TYPE 2 DIABETES MELLITUS WITHOUT COMPLICATION, WITH LONG-TERM CURRENT USE OF INSULIN (HCC): ICD-10-CM

## 2019-04-04 DIAGNOSIS — Z79.4 TYPE 2 DIABETES MELLITUS WITHOUT COMPLICATION, WITH LONG-TERM CURRENT USE OF INSULIN (HCC): ICD-10-CM

## 2019-05-09 RX ORDER — GLIMEPIRIDE 4 MG/1
4 TABLET ORAL DAILY
Qty: 90 TABLET | Refills: 1 | Status: SHIPPED | OUTPATIENT
Start: 2019-05-09 | End: 2019-11-13 | Stop reason: SDUPTHER

## 2019-05-09 RX ORDER — LOSARTAN POTASSIUM AND HYDROCHLOROTHIAZIDE 12.5; 1 MG/1; MG/1
1 TABLET ORAL DAILY
Qty: 90 TABLET | Refills: 1 | Status: SHIPPED | OUTPATIENT
Start: 2019-05-09 | End: 2019-08-10 | Stop reason: SDUPTHER

## 2019-05-09 RX ORDER — POTASSIUM CHLORIDE 750 MG/1
10 TABLET, EXTENDED RELEASE ORAL DAILY
Qty: 90 TABLET | Refills: 1 | Status: SHIPPED | OUTPATIENT
Start: 2019-05-09 | End: 2019-08-10 | Stop reason: SDUPTHER

## 2019-05-09 RX ORDER — AMLODIPINE BESYLATE 5 MG/1
5 TABLET ORAL DAILY
Qty: 90 TABLET | Refills: 1 | Status: SHIPPED | OUTPATIENT
Start: 2019-05-09 | End: 2019-08-15 | Stop reason: SDUPTHER

## 2019-05-09 RX ORDER — ATORVASTATIN CALCIUM 10 MG/1
10 TABLET, FILM COATED ORAL DAILY
Qty: 90 TABLET | Refills: 1 | Status: SHIPPED | OUTPATIENT
Start: 2019-05-09 | End: 2019-08-15 | Stop reason: SDUPTHER

## 2019-06-18 ENCOUNTER — OFFICE VISIT (OUTPATIENT)
Dept: ENDOCRINOLOGY | Facility: CLINIC | Age: 74
End: 2019-06-18

## 2019-06-18 ENCOUNTER — LAB REQUISITION (OUTPATIENT)
Dept: LAB | Facility: HOSPITAL | Age: 74
End: 2019-06-18

## 2019-06-18 VITALS
HEIGHT: 65 IN | WEIGHT: 182.4 LBS | SYSTOLIC BLOOD PRESSURE: 152 MMHG | OXYGEN SATURATION: 98 % | DIASTOLIC BLOOD PRESSURE: 74 MMHG | HEART RATE: 81 BPM | BODY MASS INDEX: 30.39 KG/M2

## 2019-06-18 DIAGNOSIS — E78.00 PURE HYPERCHOLESTEROLEMIA: ICD-10-CM

## 2019-06-18 DIAGNOSIS — I10 BENIGN ESSENTIAL HYPERTENSION: ICD-10-CM

## 2019-06-18 DIAGNOSIS — E87.6 HYPOKALEMIA: ICD-10-CM

## 2019-06-18 DIAGNOSIS — Z00.00 ROUTINE GENERAL MEDICAL EXAMINATION AT A HEALTH CARE FACILITY: ICD-10-CM

## 2019-06-18 DIAGNOSIS — E10.42 TYPE 1 DIABETES MELLITUS WITH DIABETIC POLYNEUROPATHY (HCC): Primary | ICD-10-CM

## 2019-06-18 LAB
ALBUMIN SERPL-MCNC: 4.6 G/DL (ref 3.5–5.2)
ALBUMIN/GLOB SERPL: 2.2 G/DL
ALP SERPL-CCNC: 97 U/L (ref 39–117)
ALT SERPL W P-5'-P-CCNC: 25 U/L (ref 1–33)
ANION GAP SERPL CALCULATED.3IONS-SCNC: 11 MMOL/L
AST SERPL-CCNC: 26 U/L (ref 1–32)
BILIRUB SERPL-MCNC: 0.7 MG/DL (ref 0.2–1.2)
BUN BLD-MCNC: 17 MG/DL (ref 8–23)
BUN/CREAT SERPL: 15.2 (ref 7–25)
CALCIUM SPEC-SCNC: 9.9 MG/DL (ref 8.6–10.5)
CHLORIDE SERPL-SCNC: 103 MMOL/L (ref 98–107)
CHOLEST SERPL-MCNC: 137 MG/DL (ref 0–200)
CO2 SERPL-SCNC: 28 MMOL/L (ref 22–29)
CREAT BLD-MCNC: 1.12 MG/DL (ref 0.57–1)
GFR SERPL CREATININE-BSD FRML MDRD: 48 ML/MIN/1.73
GLOBULIN UR ELPH-MCNC: 2.1 GM/DL
GLUCOSE BLD-MCNC: 144 MG/DL (ref 65–99)
GLUCOSE BLDC GLUCOMTR-MCNC: 173 MG/DL (ref 70–130)
HBA1C MFR BLD: 7 %
HDLC SERPL-MCNC: 47 MG/DL (ref 40–60)
LDLC SERPL CALC-MCNC: 67 MG/DL (ref 0–100)
LDLC/HDLC SERPL: 1.42 {RATIO}
POTASSIUM BLD-SCNC: 4.9 MMOL/L (ref 3.5–5.2)
PROT SERPL-MCNC: 6.7 G/DL (ref 6–8.5)
SODIUM BLD-SCNC: 142 MMOL/L (ref 136–145)
T4 FREE SERPL-MCNC: 1.25 NG/DL (ref 0.93–1.7)
TRIGL SERPL-MCNC: 117 MG/DL (ref 0–150)
TSH SERPL DL<=0.05 MIU/L-ACNC: 1.95 MIU/ML (ref 0.27–4.2)
VLDLC SERPL-MCNC: 23.4 MG/DL

## 2019-06-18 PROCEDURE — 80053 COMPREHEN METABOLIC PANEL: CPT

## 2019-06-18 PROCEDURE — 82570 ASSAY OF URINE CREATININE: CPT

## 2019-06-18 PROCEDURE — 80061 LIPID PANEL: CPT

## 2019-06-18 PROCEDURE — 83036 HEMOGLOBIN GLYCOSYLATED A1C: CPT | Performed by: INTERNAL MEDICINE

## 2019-06-18 PROCEDURE — 84443 ASSAY THYROID STIM HORMONE: CPT

## 2019-06-18 PROCEDURE — 82947 ASSAY GLUCOSE BLOOD QUANT: CPT | Performed by: INTERNAL MEDICINE

## 2019-06-18 PROCEDURE — 82043 UR ALBUMIN QUANTITATIVE: CPT

## 2019-06-18 PROCEDURE — 84439 ASSAY OF FREE THYROXINE: CPT

## 2019-06-18 PROCEDURE — 99214 OFFICE O/P EST MOD 30 MIN: CPT | Performed by: INTERNAL MEDICINE

## 2019-06-18 RX ORDER — BLOOD SUGAR DIAGNOSTIC
STRIP MISCELLANEOUS
Qty: 400 EACH | Refills: 3 | Status: SHIPPED | OUTPATIENT
Start: 2019-06-18

## 2019-06-18 RX ORDER — BLOOD-GLUCOSE METER
1 EACH MISCELLANEOUS DAILY
Qty: 1 KIT | Refills: 0 | Status: SHIPPED | OUTPATIENT
Start: 2019-06-18

## 2019-06-18 NOTE — PROGRESS NOTES
Chinyere Olvera 74 y.o.  CC:Follow-up and Diabetes (Type II, eye exam was May 2019 Dr Cruz )      [place]: Follow-up and Diabetes (Type II, eye exam was May 2019 Dr Cruz )    Blood sugar and 90 day average sugar reviewed  Results for orders placed or performed in visit on 06/18/19   POC Glycosylated Hemoglobin (Hb A1C)   Result Value Ref Range    Hemoglobin A1C 7.0 %   POC Glucose Fingerstick   Result Value Ref Range    Glucose 173 (A) 70 - 130 mg/dL     Average sugar is 150  Is utd with eye exam  No neuropathy, callus or ulcer  Ur alb neg 2017  bp is high   Is on norvasc   Has grandchildren now - babysitting for last 2 weeks   Recheck bp 138/64  Is doing intermittent fasting - sleeping better  Weight is stable  In interim has had spider bite dorsal right foot   Blister - slow to heal     Allergies   Allergen Reactions   • Penicillins Rash       Current Outpatient Medications:   •  amLODIPine (NORVASC) 5 MG tablet, Take 1 tablet by mouth Daily., Disp: 90 tablet, Rfl: 1  •  atorvastatin (LIPITOR) 10 MG tablet, Take 1 tablet by mouth Daily., Disp: 90 tablet, Rfl: 1  •  Blood Glucose Monitoring Suppl (ONE TOUCH ULTRA 2) W/DEVICE kit, Use daily to test blood sugars  Dx:  E11.9, Disp: 1 each, Rfl: 0  •  buPROPion XL (WELLBUTRIN XL) 150 MG 24 hr tablet, , Disp: , Rfl:   •  Ferrous Fumarate (IRON) 18 MG tablet controlled-release, Take  by mouth daily., Disp: , Rfl:   •  glimepiride (AMARYL) 4 MG tablet, Take 1 tablet by mouth Daily., Disp: 90 tablet, Rfl: 1  •  glucose blood (ONE TOUCH ULTRA TEST) test strip, Test blood sugars QID, Disp: 400 each, Rfl: 3  •  insulin aspart (NOVOLOG FLEXPEN) 100 UNIT/ML solution pen-injector sc pen, Inject 10 Units under the skin into the appropriate area as directed 3 (Three) Times a Day With Meals., Disp: 30 mL, Rfl: 3  •  Insulin Glargine (LANTUS SOLOSTAR) 100 UNIT/ML injection pen, INJECT 30 UNITS UNDER THE SKIN ONCE NIGHTLY, Disp: 15 mL, Rfl: 2  •  Insulin Pen Needle (B-D ULTRAFINE III  "SHORT PEN) 31G X 8 MM misc, Use QID to inject insulin, Disp: 400 each, Rfl: 3  •  Insulin Syringe-Needle U-100 (BD INSULIN SYRINGE ULTRAFINE) 31G X 5/16\" 0.5 ML misc, , Disp: , Rfl:   •  lansoprazole (PREVACID) 30 MG capsule, Take  by mouth., Disp: , Rfl:   •  losartan-hydrochlorothiazide (HYZAAR) 100-12.5 MG per tablet, Take 1 tablet by mouth Daily., Disp: 90 tablet, Rfl: 1  •  metoprolol tartrate (LOPRESSOR) 25 MG tablet, Take 0.25 tablets by mouth 2 (two) times a day., Disp: , Rfl:   •  Omega-3 Fatty Acids (FISH OIL) 1000 MG capsule capsule, Take  by mouth daily., Disp: , Rfl:   •  potassium chloride (K-DUR,KLOR-CON) 10 MEQ CR tablet, Take 1 tablet by mouth Daily. with food., Disp: 90 tablet, Rfl: 1  •  PRODIGY TWIST TOP LANCETS 28G misc, Use 4 per day to test blood sugars, Disp: 400 each, Rfl: 3  •  zinc gluconate 50 MG tablet, zinc gluconate 50 mg tablet  Daily, Disp: , Rfl:   Patient Active Problem List    Diagnosis   • Preoperative evaluation to rule out surgical contraindication [Z01.818]   • Obstructive sleep apnea syndrome [G47.33]   • Peripheral neuropathy, idiopathic [G60.9]   • Stress fracture of foot [M84.376A]   • Foot pain [M79.673]   • Pain in left foot [M79.672]   • Abnormal liver function tests [R94.5]   • Anemia [D64.9]   • Benign essential hypertension [I10]   • 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   HENT: Negative for congestion, dental problem, drooling, ear discharge, ear pain, facial swelling, hearing loss, mouth " sores, nosebleeds, postnasal drip, rhinorrhea, sinus pressure, sneezing, sore throat, tinnitus, trouble swallowing and voice change.    Eyes: Negative for photophobia, pain, discharge, redness, itching and visual disturbance.   Respiratory: Negative for apnea, cough, choking, chest tightness, shortness of breath, wheezing and stridor.    Cardiovascular: Negative for chest pain, palpitations and leg swelling.   Gastrointestinal: Negative for abdominal distention, abdominal pain, anal bleeding, blood in stool, constipation, diarrhea, nausea, rectal pain and vomiting.   Endocrine: Negative for cold intolerance, heat intolerance, polydipsia, polyphagia and polyuria.   Genitourinary: Negative for decreased urine volume, difficulty urinating, dysuria, enuresis, flank pain, frequency, genital sores, hematuria and urgency.   Musculoskeletal: Negative for arthralgias, back pain, gait problem, joint swelling, myalgias, neck pain and neck stiffness.   Skin: Positive for rash. Negative for color change, pallor and wound.   Allergic/Immunologic: Negative for environmental allergies, food allergies and immunocompromised state.   Neurological: Negative for dizziness, tremors, seizures, syncope, facial asymmetry, speech difficulty, weakness, light-headedness, numbness and headaches.   Hematological: Negative for adenopathy. Does not bruise/bleed easily.   Psychiatric/Behavioral: Negative for agitation, behavioral problems, confusion, decreased concentration, dysphoric mood, hallucinations, self-injury, sleep disturbance and suicidal ideas. The patient is not nervous/anxious and is not hyperactive.      Social History     Socioeconomic History   • Marital status:      Spouse name: Not on file   • Number of children: Not on file   • Years of education: Not on file   • Highest education level: Not on file   Tobacco Use   • Smoking status: Former Smoker   • Smokeless tobacco: Never Used   Substance and Sexual Activity   •  "Alcohol use: Yes     Alcohol/week: 4.2 oz     Types: 7 Glasses of wine per week   • Drug use: No   • Sexual activity: Defer     Family History   Problem Relation Age of Onset   • Diabetes Father    • Diabetes Maternal Grandfather    • Coronary artery disease Other      /74   Pulse 81   Ht 165.7 cm (65.25\")   Wt 82.7 kg (182 lb 6.4 oz)   SpO2 98%   Breastfeeding? No   BMI 30.12 kg/m²   Physical Exam   Constitutional: She is oriented to person, place, and time. She appears well-developed and well-nourished.   HENT:   Head: Normocephalic and atraumatic.   Nose: Nose normal.   Mouth/Throat: Oropharynx is clear and moist.   Eyes: Conjunctivae, EOM and lids are normal. Pupils are equal, round, and reactive to light.   Neck: Trachea normal and normal range of motion. Neck supple. Carotid bruit is not present. No tracheal deviation present. No thyroid mass and no thyromegaly present.   Cardiovascular: Normal rate, regular rhythm, normal heart sounds and intact distal pulses. Exam reveals no gallop and no friction rub.   No murmur heard.  Pulmonary/Chest: Effort normal and breath sounds normal. No respiratory distress. She has no wheezes.   Musculoskeletal: Normal range of motion. She exhibits no edema or deformity.    Chinyere had a diabetic foot exam performed today.   During the foot exam she had a monofilament test performed.    Neurological Sensory Findings - Unaltered hot/cold right ankle/foot discrimination and unaltered hot/cold left ankle/foot discrimination. Unaltered sharp/dull right ankle/foot discrimination and unaltered sharp/dull left ankle/foot discrimination. No right ankle/foot altered proprioception and no left ankle/foot altered proprioception  Vascular Status -  Her right foot exhibits normal foot vasculature  and no edema. Her left foot exhibits normal foot vasculature  and no edema.  Skin Integrity  -  Her right foot skin is intact.Her left foot skin is intact..     Lymphadenopathy:     She has " no cervical adenopathy.   Neurological: She is alert and oriented to person, place, and time. She has normal reflexes. She displays normal reflexes. No cranial nerve deficit.   Skin: Skin is warm and dry. No rash noted. No cyanosis or erythema. Nails show no clubbing.   Psychiatric: She has a normal mood and affect. Her speech is normal and behavior is normal. Judgment and thought content normal. Cognition and memory are normal.   Nursing note and vitals reviewed.    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) - Primary     Blood sugar and 90 day average sugar reviewed  Results for orders placed or performed in visit on 06/18/19   POC Glycosylated Hemoglobin (Hb A1C)   Result Value Ref Range    Hemoglobin A1C 7.0 %   POC Glucose Fingerstick   Result Value Ref Range    Glucose 173 (A) 70 - 130 mg/dL     Average sugar is 150   Is utd with eye exam  No neuropathy but has healing lesion right foot after spider bite  Foot care reviewed  Ur alb due today  F/u 3-4 months          Relevant Orders    POC Glycosylated Hemoglobin (Hb A1C) (Completed)    POC Glucose Fingerstick (Completed)       Other    Hypokalemia     Check cmp - on potassium supplement              Return in about 3 months (around 9/18/2019) for Recheck 30 min .    Renetta Rosario MA  Signed Maria Eugenia Lawrence MD

## 2019-06-19 LAB
ALBUMIN UR-MCNC: 1.4 MG/L
CREAT UR-MCNC: 76.3 MG/DL
MICROALBUMIN/CREAT UR: 18.3 MG/G

## 2019-06-19 NOTE — ASSESSMENT & PLAN NOTE
Check flp - is on lipitor 10 mg daily as well   Try to get 30 min exercise daily and low fat diet

## 2019-06-19 NOTE — ASSESSMENT & PLAN NOTE
Blood sugar and 90 day average sugar reviewed  Results for orders placed or performed in visit on 06/18/19   POC Glycosylated Hemoglobin (Hb A1C)   Result Value Ref Range    Hemoglobin A1C 7.0 %   POC Glucose Fingerstick   Result Value Ref Range    Glucose 173 (A) 70 - 130 mg/dL     Average sugar is 150   Is utd with eye exam  No neuropathy but has healing lesion right foot after spider bite  Foot care reviewed  Ur alb due today  F/u 3-4 months

## 2019-08-10 RX ORDER — POTASSIUM CHLORIDE 750 MG/1
10 TABLET, EXTENDED RELEASE ORAL DAILY
Qty: 90 TABLET | Refills: 1 | Status: SHIPPED | OUTPATIENT
Start: 2019-08-10 | End: 2019-12-09 | Stop reason: SDUPTHER

## 2019-08-10 RX ORDER — LOSARTAN POTASSIUM AND HYDROCHLOROTHIAZIDE 12.5; 1 MG/1; MG/1
TABLET ORAL
Qty: 90 TABLET | Refills: 1 | Status: SHIPPED | OUTPATIENT
Start: 2019-08-10 | End: 2019-12-09 | Stop reason: SDUPTHER

## 2019-08-15 RX ORDER — ATORVASTATIN CALCIUM 10 MG/1
TABLET, FILM COATED ORAL
Qty: 90 TABLET | Refills: 1 | Status: SHIPPED | OUTPATIENT
Start: 2019-08-15 | End: 2020-02-28

## 2019-08-15 RX ORDER — AMLODIPINE BESYLATE 5 MG/1
TABLET ORAL
Qty: 90 TABLET | Refills: 1 | Status: SHIPPED | OUTPATIENT
Start: 2019-08-15 | End: 2020-02-28

## 2019-09-05 LAB
ALBUMIN SERPL-MCNC: 4.6 G/DL (ref 3.5–5.2)
ALBUMIN/CREAT UR: 1.8 MG/G CREAT (ref 0–30)
ALBUMIN/GLOB SERPL: 2.2 G/DL
ALP SERPL-CCNC: 97 U/L (ref 39–117)
ALT SERPL-CCNC: 25 U/L (ref 1–41)
AST SERPL-CCNC: 26 U/L (ref 1–40)
BILIRUB SERPL-MCNC: 0.7 MG/DL (ref 0.2–1.2)
BUN SERPL-MCNC: 17 MG/DL (ref 8–23)
BUN/CREAT SERPL: 15.2 (ref 7–25)
CALCIUM SERPL-MCNC: 9.8 MG/DL (ref 8.6–10.5)
CHLORIDE SERPL-SCNC: 103 MMOL/L (ref 98–107)
CHOLEST SERPL-MCNC: 137 MG/DL (ref 0–200)
CO2 SERPL-SCNC: 28 MMOL/L (ref 22–29)
CREAT SERPL-MCNC: 1.12 MG/DL (ref 0.76–1.27)
CREAT UR-MCNC: 76.3 MG/DL
GLOBULIN SER CALC-MCNC: 2.1 G/DL
GLUCOSE SERPL-MCNC: 144 MG/DL (ref 65–99)
HDLC SERPL-MCNC: 47 MG/DL (ref 40–60)
LDLC SERPL CALC-MCNC: 67 MG/DL (ref 0–99)
MICROALBUMIN UR-MCNC: 1.4 UG/ML
POTASSIUM SERPL-SCNC: 4.9 MMOL/L (ref 3.5–5.2)
PROT SERPL-MCNC: 6.7 G/DL (ref 6–8.5)
SODIUM SERPL-SCNC: 142 MMOL/L (ref 136–145)
T4 FREE SERPL-MCNC: 1.25 NG/DL (ref 0.93–1.7)
TRIGL SERPL-MCNC: 117 MG/DL (ref 0–150)
TSH SERPL DL<=0.005 MIU/L-ACNC: 1.95 MIU/ML (ref 0.27–4.2)
VLDLC SERPL CALC-MCNC: 23 MG/DL

## 2019-10-04 ENCOUNTER — OFFICE VISIT (OUTPATIENT)
Dept: ENDOCRINOLOGY | Facility: CLINIC | Age: 74
End: 2019-10-04

## 2019-10-04 VITALS
DIASTOLIC BLOOD PRESSURE: 80 MMHG | HEART RATE: 87 BPM | HEIGHT: 66 IN | SYSTOLIC BLOOD PRESSURE: 162 MMHG | BODY MASS INDEX: 28.96 KG/M2 | WEIGHT: 180.2 LBS | OXYGEN SATURATION: 94 %

## 2019-10-04 DIAGNOSIS — D64.9 ANEMIA, UNSPECIFIED TYPE: ICD-10-CM

## 2019-10-04 DIAGNOSIS — E78.00 PURE HYPERCHOLESTEROLEMIA: ICD-10-CM

## 2019-10-04 DIAGNOSIS — E55.9 VITAMIN D DEFICIENCY: ICD-10-CM

## 2019-10-04 DIAGNOSIS — R14.0 ABDOMINAL BLOATING: ICD-10-CM

## 2019-10-04 DIAGNOSIS — E10.42 TYPE 1 DIABETES MELLITUS WITH DIABETIC POLYNEUROPATHY (HCC): Primary | ICD-10-CM

## 2019-10-04 DIAGNOSIS — I10 BENIGN ESSENTIAL HYPERTENSION: ICD-10-CM

## 2019-10-04 LAB
GLUCOSE BLDC GLUCOMTR-MCNC: 214 MG/DL (ref 70–130)
HBA1C MFR BLD: 6.6 %

## 2019-10-04 PROCEDURE — G0008 ADMIN INFLUENZA VIRUS VAC: HCPCS | Performed by: INTERNAL MEDICINE

## 2019-10-04 PROCEDURE — 83036 HEMOGLOBIN GLYCOSYLATED A1C: CPT | Performed by: INTERNAL MEDICINE

## 2019-10-04 PROCEDURE — 90653 IIV ADJUVANT VACCINE IM: CPT | Performed by: INTERNAL MEDICINE

## 2019-10-04 PROCEDURE — 99214 OFFICE O/P EST MOD 30 MIN: CPT | Performed by: INTERNAL MEDICINE

## 2019-10-04 PROCEDURE — 82947 ASSAY GLUCOSE BLOOD QUANT: CPT | Performed by: INTERNAL MEDICINE

## 2019-10-04 RX ORDER — PHENOL 1.4 %
AEROSOL, SPRAY (ML) MUCOUS MEMBRANE
COMMUNITY
Start: 2019-07-01

## 2019-10-04 RX ORDER — FLASH GLUCOSE SENSOR
1 KIT MISCELLANEOUS
Qty: 6 EACH | Refills: 3 | Status: SHIPPED | OUTPATIENT
Start: 2019-10-04 | End: 2020-08-10

## 2019-10-04 NOTE — ASSESSMENT & PLAN NOTE
bp is high- discussed repeat checking at home and call if over 145/85  Continue current medications- goals for bp control reviewed  Higher bp 6/19 as well   Is on amlodipine 5 mg daily, losartan hctz 100/12.5 mg and metoprolol 25 mg daily

## 2019-10-04 NOTE — PROGRESS NOTES
Chinyere Olvera 74 y.o.  CC:Follow-up; Diabetes (Type II, eye exam July 2019 Dr Fink); Hyperlipidemia; and Hypothyroidism      [place]: Follow-up; Diabetes (Type II, eye exam July 2019 Dr Fink); Hyperlipidemia; and Hypothyroidism    Blood sugar and 90 day average sugar reviewed  Results for orders placed or performed in visit on 10/04/19   POC Glycosylated Hemoglobin (Hb A1C)   Result Value Ref Range    Hemoglobin A1C 6.6 %   POC Glucose Fingerstick   Result Value Ref Range    Glucose 214 (A) 70 - 130 mg/dL     Average sugar is 135   Is on low fat diet   Blood sugar is high  Is not currently on thyroid supplement   H/o iron deficiency - stopped iron   Some bloating and early satiety- she requests vaginal u/s and we discussed some other causes of early satiety including diabetic gastropathy, frequent small meals     Allergies   Allergen Reactions   • Penicillins Rash       Current Outpatient Medications:   •  ACCU-CHEK GUIDE test strip, Test blood sugars QID, Disp: 400 each, Rfl: 3  •  amLODIPine (NORVASC) 5 MG tablet, TAKE 1 TABLET EVERY DAY, Disp: 90 tablet, Rfl: 1  •  atorvastatin (LIPITOR) 10 MG tablet, TAKE 1 TABLET EVERY DAY, Disp: 90 tablet, Rfl: 1  •  Blood Glucose Monitoring Suppl (ACCU-CHEK GUIDE) w/Device kit, 1 Device Daily., Disp: 1 kit, Rfl: 0  •  buPROPion XL (WELLBUTRIN XL) 150 MG 24 hr tablet, , Disp: , Rfl:   •  Ferrous Fumarate (IRON) 18 MG tablet controlled-release, Take  by mouth daily., Disp: , Rfl:   •  glimepiride (AMARYL) 4 MG tablet, Take 1 tablet by mouth Daily., Disp: 90 tablet, Rfl: 1  •  glucose blood (ONE TOUCH ULTRA TEST) test strip, Test blood sugars QID, Disp: 400 each, Rfl: 3  •  insulin aspart (NOVOLOG FLEXPEN) 100 UNIT/ML solution pen-injector sc pen, Inject 10 Units under the skin into the appropriate area as directed 3 (Three) Times a Day With Meals., Disp: 30 mL, Rfl: 3  •  Insulin Glargine (LANTUS SOLOSTAR) 100 UNIT/ML injection pen, INJECT 30 UNITS UNDER THE SKIN ONCE  "NIGHTLY, Disp: 15 mL, Rfl: 3  •  Insulin Pen Needle (B-D ULTRAFINE III SHORT PEN) 31G X 8 MM misc, USE FOUR TIMES DAILY TO INJECT INSULIN, Disp: 400 each, Rfl: 0  •  Insulin Syringe-Needle U-100 (BD INSULIN SYRINGE ULTRAFINE) 31G X 5/16\" 0.5 ML misc, , Disp: , Rfl:   •  lansoprazole (PREVACID) 30 MG capsule, Take  by mouth., Disp: , Rfl:   •  losartan-hydrochlorothiazide (HYZAAR) 100-12.5 MG per tablet, TAKE 1 TABLET EVERY DAY, Disp: 90 tablet, Rfl: 1  •  metoprolol tartrate (LOPRESSOR) 25 MG tablet, Take 0.25 tablets by mouth 2 (two) times a day., Disp: , Rfl:   •  Omega-3 Fatty Acids (FISH OIL) 1000 MG capsule capsule, Take  by mouth daily., Disp: , Rfl:   •  potassium chloride (K-DUR,KLOR-CON) 10 MEQ CR tablet, TAKE 1 TABLET BY MOUTH DAILY WITH FOOD., Disp: 90 tablet, Rfl: 1  •  PRODIGY TWIST TOP LANCETS 28G misc, Use 4 per day to test blood sugars, Disp: 400 each, Rfl: 3  •  zinc gluconate 50 MG tablet, zinc gluconate 50 mg tablet  Daily, Disp: , Rfl:   Patient Active Problem List    Diagnosis   • Preoperative evaluation to rule out surgical contraindication [Z01.818]   • Obstructive sleep apnea syndrome [G47.33]   • Peripheral neuropathy, idiopathic [G60.9]   • Stress fracture of foot [M84.376A]   • Foot pain [M79.673]   • Pain in left foot [M79.672]   • Abnormal liver function tests [R94.5]   • Anemia [D64.9]   • Benign essential hypertension [I10]   • 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 Negative for activity change, chills, diaphoresis, fatigue, fever and unexpected weight change.   HENT: Negative for congestion, " dental problem, drooling, ear discharge, ear pain, facial swelling, hearing loss, mouth sores, nosebleeds, postnasal drip, rhinorrhea, sinus pressure, sneezing, sore throat, tinnitus, trouble swallowing and voice change.    Eyes: Negative for photophobia, pain, discharge, redness, itching and visual disturbance.   Respiratory: Negative for apnea, cough, choking, chest tightness, shortness of breath, wheezing and stridor.    Cardiovascular: Negative for chest pain, palpitations and leg swelling.   Gastrointestinal: Negative for abdominal distention, abdominal pain, anal bleeding, blood in stool, constipation, diarrhea, nausea, rectal pain and vomiting.        Early satiety with bloating    Endocrine: Negative for cold intolerance, heat intolerance, polydipsia, polyphagia and polyuria.   Genitourinary: Negative for decreased urine volume, difficulty urinating, dysuria, enuresis, flank pain, frequency, genital sores, hematuria and urgency.   Musculoskeletal: Positive for arthralgias. Negative for back pain, gait problem, joint swelling, myalgias, neck pain and neck stiffness.   Skin: Negative for color change, pallor, rash and wound.   Allergic/Immunologic: Negative for environmental allergies, food allergies and immunocompromised state.   Neurological: Negative for dizziness, tremors, seizures, syncope, facial asymmetry, speech difficulty, weakness, light-headedness, numbness and headaches.   Hematological: Negative for adenopathy. Does not bruise/bleed easily.   Psychiatric/Behavioral: Negative for agitation, behavioral problems, confusion, decreased concentration, dysphoric mood, hallucinations, self-injury, sleep disturbance and suicidal ideas. The patient is not nervous/anxious and is not hyperactive.      Social History     Socioeconomic History   • Marital status:      Spouse name: Not on file   • Number of children: Not on file   • Years of education: Not on file   • Highest education level: Not on file  "  Tobacco Use   • Smoking status: Former Smoker   • Smokeless tobacco: Never Used   Substance and Sexual Activity   • Alcohol use: Yes     Alcohol/week: 4.2 oz     Types: 7 Glasses of wine per week   • Drug use: No   • Sexual activity: Defer     Family History   Problem Relation Age of Onset   • Diabetes Father    • Diabetes Maternal Grandfather    • Coronary artery disease Other      /80   Pulse 87   Ht 167.6 cm (66\")   Wt 81.7 kg (180 lb 3.2 oz)   SpO2 94%   BMI 29.09 kg/m²   Physical Exam   Constitutional: She is oriented to person, place, and time. She appears well-developed and well-nourished.   HENT:   Head: Normocephalic and atraumatic.   Nose: Nose normal.   Mouth/Throat: Oropharynx is clear and moist.   Eyes: Conjunctivae, EOM and lids are normal. Pupils are equal, round, and reactive to light.   Neck: Trachea normal and normal range of motion. Neck supple. Carotid bruit is not present. No tracheal deviation present. No thyroid mass and no thyromegaly present.   Cardiovascular: Normal rate, regular rhythm, normal heart sounds and intact distal pulses. Exam reveals no gallop and no friction rub.   No murmur heard.  Pulmonary/Chest: Effort normal and breath sounds normal. No respiratory distress. She has no wheezes.   Musculoskeletal: Normal range of motion. She exhibits no edema or deformity.    Chinyere had a diabetic foot exam performed today.   During the foot exam she had a monofilament test performed.    Neurological Sensory Findings - Unaltered hot/cold right ankle/foot discrimination and unaltered hot/cold left ankle/foot discrimination. Unaltered sharp/dull right ankle/foot discrimination and unaltered sharp/dull left ankle/foot discrimination. No right ankle/foot altered proprioception and no left ankle/foot altered proprioception  Vascular Status -  Her right foot exhibits normal foot vasculature  and no edema. Her left foot exhibits normal foot vasculature  and no edema.  Skin Integrity  -  " Her right foot skin is intact.Her left foot skin is intact..     Lymphadenopathy:     She has no cervical adenopathy.   Neurological: She is alert and oriented to person, place, and time. She has normal reflexes. She displays normal reflexes. No cranial nerve deficit.   Skin: Skin is warm and dry. No rash noted. No cyanosis or erythema. Nails show no clubbing.   Psychiatric: She has a normal mood and affect. Her speech is normal and behavior is normal. Judgment and thought content normal. Cognition and memory are normal.   Nursing note and vitals reviewed.    Results for orders placed or performed in visit on 06/18/19   Comprehensive Metabolic Panel   Result Value Ref Range    Glucose 144 (H) 65 - 99 mg/dL    BUN 17 8 - 23 mg/dL    Creatinine 1.12 0.76 - 1.27 mg/dL    eGFR Non African Am 48 (L) >60 mL/min/1.73    BUN/Creatinine Ratio 15.2 7.0 - 25.0    Sodium 142 136 - 145 mmol/L    Potassium 4.9 3.5 - 5.2 mmol/L    Chloride 103 98 - 107 mmol/L    Total CO2 28.0 22.0 - 29.0 mmol/L    Calcium 9.8 8.6 - 10.5 mg/dL    Total Protein 6.7 6.0 - 8.5 g/dL    Albumin 4.60 3.50 - 5.20 g/dL    Globulin 2.1 g/dL    A/G Ratio 2.2 g/dL    Total Bilirubin 0.7 0.2 - 1.2 mg/dL    Alkaline Phosphatase 97 39 - 117 U/L    AST (SGOT) 26 1 - 40 U/L    ALT (SGPT) 25 1 - 41 U/L   Lipid Panel   Result Value Ref Range    Total Cholesterol 137 0 - 200 mg/dL    Triglycerides 117 0 - 150 mg/dL    HDL Cholesterol 47 40 - 60 mg/dL    VLDL Cholesterol 23 mg/dL    LDL Cholesterol  67 0 - 99 mg/dL   Microalbumin / Creatinine Urine Ratio -   Result Value Ref Range    Creatinine, Urine 76.3 mg/dL    Microalbumin, Urine 1.4 Not Estab. ug/mL    Microalbumin/Creatinine Ratio 1.8 0.0 - 30.0 mg/g creat   TSH   Result Value Ref Range    TSH 1.950 0.270 - 4.200 mIU/mL   T4, Free   Result Value Ref Range    Free T4 1.25 0.93 - 1.70 ng/dL     Chinyere was seen today for follow-up, diabetes, hyperlipidemia and hypothyroidism.    Diagnoses and all orders for this  visit:    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 (CMS/HCC)  -     POC Glycosylated Hemoglobin (Hb A1C)  -     POC Glucose Fingerstick    Other orders  -     Insulin Glargine (LANTUS SOLOSTAR) 100 UNIT/ML injection pen; INJECT 30 UNITS UNDER THE SKIN ONCE NIGHTLY      Problem List Items Addressed This Visit        Cardiovascular and Mediastinum    Hyperlipidemia     Is on low fat diet and lipitor 10 mg daily   Check flp          Relevant Orders    Lipid Panel    TSH    T4, Free    Benign essential hypertension     bp is high- discussed repeat checking at home and call if over 145/85  Continue current medications- goals for bp control reviewed  Higher bp 6/19 as well   Is on amlodipine 5 mg daily, losartan hctz 100/12.5 mg and metoprolol 25 mg daily            Relevant Orders    TSH       Digestive    Vitamin D deficiency     Is on vitamin D supplement   Continue monitoring and supplementation             Endocrine    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 (CMS/[facility]) - Primary     Blood sugar and 90 day average sugar reviewed  Results for orders placed or performed in visit on 10/04/19   POC Glycosylated Hemoglobin (Hb A1C)   Result Value Ref Range    Hemoglobin A1C 6.6 %   POC Glucose Fingerstick   Result Value Ref Range    Glucose 214 (A) 70 - 130 mg/dL     Average sugar is 135   Doing well on lantus and novolog   Is utd with eye exam  Neuropathy with dorsal callus and hammertoe   Ur alb due today   F/u 3-4 months          Relevant Medications    Insulin Glargine (LANTUS SOLOSTAR) 100 UNIT/ML injection pen    Other Relevant Orders    POC Glycosylated Hemoglobin (Hb A1C) (Completed)    POC Glucose Fingerstick (Completed)    Comprehensive Metabolic Panel    Microalbumin / Creatinine Urine Ratio -       Hematopoietic and Hemostatic    Anemia     Off iron because she thought it was causing bloating and early satiety  Check cbc and iron levels          Relevant Orders    CBC Auto  Differential    Iron       Other    Abdominal bloating     Likely due to gastropathy- u/s ordered and patient encouraged to f/u with gyn   ges if neg   Frequent small meals discussed          Relevant Orders    US Pelvis Complete        Return in about 3 months (around 1/4/2020) for Recheck 30 min .    Renetta Rosario MA  Signed Maria Eugenia Lawrence MD

## 2019-10-04 NOTE — ASSESSMENT & PLAN NOTE
Likely due to gastropathy- u/s ordered and patient encouraged to f/u with gyn   ges if neg   Frequent small meals discussed

## 2019-10-04 NOTE — ASSESSMENT & PLAN NOTE
Off iron because she thought it was causing bloating and early satiety  Check cbc and iron levels

## 2019-10-04 NOTE — ASSESSMENT & PLAN NOTE
Blood sugar and 90 day average sugar reviewed  Results for orders placed or performed in visit on 10/04/19   POC Glycosylated Hemoglobin (Hb A1C)   Result Value Ref Range    Hemoglobin A1C 6.6 %   POC Glucose Fingerstick   Result Value Ref Range    Glucose 214 (A) 70 - 130 mg/dL     Average sugar is 135   Doing well on lantus and novolog   Is utd with eye exam  Neuropathy with dorsal callus and hammertoe   Ur alb due today   F/u 3-4 months

## 2019-10-05 LAB
ALBUMIN SERPL-MCNC: 4.9 G/DL (ref 3.5–5.2)
ALBUMIN/CREAT UR: 21.1 MG/G CREAT (ref 0–30)
ALBUMIN/GLOB SERPL: 2.1 G/DL
ALP SERPL-CCNC: 93 U/L (ref 39–117)
ALT SERPL-CCNC: 24 U/L (ref 1–33)
AST SERPL-CCNC: 20 U/L (ref 1–32)
BASOPHILS # BLD AUTO: 0.02 10*3/MM3 (ref 0–0.2)
BASOPHILS NFR BLD AUTO: 0.4 % (ref 0–1.5)
BILIRUB SERPL-MCNC: 0.5 MG/DL (ref 0.2–1.2)
BUN SERPL-MCNC: 17 MG/DL (ref 8–23)
BUN/CREAT SERPL: 15.6 (ref 7–25)
CALCIUM SERPL-MCNC: 9.6 MG/DL (ref 8.6–10.5)
CHLORIDE SERPL-SCNC: 102 MMOL/L (ref 98–107)
CHOLEST SERPL-MCNC: 142 MG/DL (ref 0–200)
CO2 SERPL-SCNC: 25.6 MMOL/L (ref 22–29)
CREAT SERPL-MCNC: 1.09 MG/DL (ref 0.57–1)
CREAT UR-MCNC: 81.9 MG/DL
EOSINOPHIL # BLD AUTO: 0.02 10*3/MM3 (ref 0–0.4)
EOSINOPHIL NFR BLD AUTO: 0.4 % (ref 0.3–6.2)
ERYTHROCYTE [DISTWIDTH] IN BLOOD BY AUTOMATED COUNT: 12.6 % (ref 12.3–15.4)
GLOBULIN SER CALC-MCNC: 2.3 GM/DL
GLUCOSE SERPL-MCNC: 231 MG/DL (ref 65–99)
HCT VFR BLD AUTO: 38.9 % (ref 34–46.6)
HDLC SERPL-MCNC: 52 MG/DL (ref 40–60)
HGB BLD-MCNC: 12.8 G/DL (ref 12–15.9)
IMM GRANULOCYTES # BLD AUTO: 0.02 10*3/MM3 (ref 0–0.05)
IMM GRANULOCYTES NFR BLD AUTO: 0.4 % (ref 0–0.5)
IRON SERPL-MCNC: 59 MCG/DL (ref 37–145)
LDLC SERPL CALC-MCNC: 68 MG/DL (ref 0–100)
LYMPHOCYTES # BLD AUTO: 1.1 10*3/MM3 (ref 0.7–3.1)
LYMPHOCYTES NFR BLD AUTO: 19.6 % (ref 19.6–45.3)
MCH RBC QN AUTO: 30.8 PG (ref 26.6–33)
MCHC RBC AUTO-ENTMCNC: 32.9 G/DL (ref 31.5–35.7)
MCV RBC AUTO: 93.7 FL (ref 79–97)
MICROALBUMIN UR-MCNC: 17.3 UG/ML
MONOCYTES # BLD AUTO: 0.34 10*3/MM3 (ref 0.1–0.9)
MONOCYTES NFR BLD AUTO: 6 % (ref 5–12)
NEUTROPHILS # BLD AUTO: 4.12 10*3/MM3 (ref 1.7–7)
NEUTROPHILS NFR BLD AUTO: 73.2 % (ref 42.7–76)
NRBC BLD AUTO-RTO: 0 /100 WBC (ref 0–0.2)
PLATELET # BLD AUTO: 256 10*3/MM3 (ref 140–450)
POTASSIUM SERPL-SCNC: 4.3 MMOL/L (ref 3.5–5.2)
PROT SERPL-MCNC: 7.2 G/DL (ref 6–8.5)
RBC # BLD AUTO: 4.15 10*6/MM3 (ref 3.77–5.28)
SODIUM SERPL-SCNC: 142 MMOL/L (ref 136–145)
T4 FREE SERPL-MCNC: 1.37 NG/DL (ref 0.93–1.7)
TRIGL SERPL-MCNC: 112 MG/DL (ref 0–150)
TSH SERPL DL<=0.005 MIU/L-ACNC: 1.96 UIU/ML (ref 0.27–4.2)
VLDLC SERPL CALC-MCNC: 22.4 MG/DL
WBC # BLD AUTO: 5.62 10*3/MM3 (ref 3.4–10.8)

## 2019-10-23 ENCOUNTER — HOSPITAL ENCOUNTER (OUTPATIENT)
Dept: ULTRASOUND IMAGING | Facility: HOSPITAL | Age: 74
Discharge: HOME | End: 2019-10-23
Admitting: INTERNAL MEDICINE

## 2019-10-23 DIAGNOSIS — R14.0 ABDOMINAL BLOATING: ICD-10-CM

## 2019-10-23 PROCEDURE — 76856 US EXAM PELVIC COMPLETE: CPT

## 2019-11-13 RX ORDER — GLIMEPIRIDE 4 MG/1
TABLET ORAL
Qty: 90 TABLET | Refills: 1 | Status: SHIPPED | OUTPATIENT
Start: 2019-11-13 | End: 2020-02-28

## 2019-12-10 RX ORDER — LOSARTAN POTASSIUM AND HYDROCHLOROTHIAZIDE 12.5; 1 MG/1; MG/1
TABLET ORAL
Qty: 90 TABLET | Refills: 0 | Status: SHIPPED | OUTPATIENT
Start: 2019-12-10 | End: 2020-02-28

## 2019-12-10 RX ORDER — POTASSIUM CHLORIDE 750 MG/1
TABLET, EXTENDED RELEASE ORAL
Qty: 90 TABLET | Refills: 0 | Status: SHIPPED | OUTPATIENT
Start: 2019-12-10 | End: 2020-02-28

## 2020-01-01 NOTE — ASSESSMENT & PLAN NOTE
Is on vitamin D supplement   Continue monitoring and supplementation    Spoke with Dad patient has had no bowel movement since yesterday 830 am, more than 24 hours, urinating ok, drinking ok, other wise acting normal, please advise. Mom was updated earlier today on normal  screening.

## 2020-01-31 ENCOUNTER — OFFICE VISIT (OUTPATIENT)
Dept: ENDOCRINOLOGY | Facility: CLINIC | Age: 75
End: 2020-01-31

## 2020-01-31 VITALS
BODY MASS INDEX: 30.16 KG/M2 | WEIGHT: 181 LBS | SYSTOLIC BLOOD PRESSURE: 112 MMHG | OXYGEN SATURATION: 98 % | HEART RATE: 69 BPM | DIASTOLIC BLOOD PRESSURE: 70 MMHG | HEIGHT: 65 IN

## 2020-01-31 DIAGNOSIS — E78.00 PURE HYPERCHOLESTEROLEMIA: ICD-10-CM

## 2020-01-31 DIAGNOSIS — E10.42 TYPE 1 DIABETES MELLITUS WITH DIABETIC POLYNEUROPATHY (HCC): Primary | ICD-10-CM

## 2020-01-31 DIAGNOSIS — I10 BENIGN ESSENTIAL HYPERTENSION: ICD-10-CM

## 2020-01-31 LAB
GLUCOSE BLDC GLUCOMTR-MCNC: 170 MG/DL (ref 70–130)
HBA1C MFR BLD: 6.4 %

## 2020-01-31 PROCEDURE — 83036 HEMOGLOBIN GLYCOSYLATED A1C: CPT | Performed by: INTERNAL MEDICINE

## 2020-01-31 PROCEDURE — 99214 OFFICE O/P EST MOD 30 MIN: CPT | Performed by: INTERNAL MEDICINE

## 2020-01-31 PROCEDURE — 95251 CONT GLUC MNTR ANALYSIS I&R: CPT | Performed by: INTERNAL MEDICINE

## 2020-01-31 RX ORDER — ONDANSETRON 4 MG/1
4 TABLET, FILM COATED ORAL DAILY PRN
Qty: 30 TABLET | Refills: 1 | Status: SHIPPED | OUTPATIENT
Start: 2020-01-31 | End: 2021-01-30

## 2020-01-31 NOTE — ASSESSMENT & PLAN NOTE
Is on low fat diet and atorvastatin 10 mg daily   LDL is 68   No change in medication currently (at goal)

## 2020-01-31 NOTE — ASSESSMENT & PLAN NOTE
Current bp is well controlled   No change in management currently   Continue monitoring and current medication

## 2020-01-31 NOTE — ASSESSMENT & PLAN NOTE
Blood sugar and 90 day average sugar reviewed  Results for orders placed or performed in visit on 01/31/20   POC Glycosylated Hemoglobin (Hb A1C)   Result Value Ref Range    Hemoglobin A1C 6.4 %   POC Glucose Fingerstick   Result Value Ref Range    Glucose 170 (A) 70 - 130 mg/dL     Average sugar is 135   Is utd with eye exam  No neuropathy but does have risk related to deformity  Ur alb neg and utd   Average sugar is good  Kike downloaded and reviewed 14 days of data (worn 88% of the time)  Higher daytime sugars with drop in glucose overnight, alcides at 3-6 am with significant low sugar  Discussed reducing lantus based on this by 2 units now with continued monitoring and further adjustment if she does not find that this corrects the issue  Is in target 72% of the time, doing very well with this device and it has made a significant impact on average sugar   F/u 3-4 months

## 2020-02-25 ENCOUNTER — TELEPHONE (OUTPATIENT)
Dept: ENDOCRINOLOGY | Facility: CLINIC | Age: 75
End: 2020-02-25

## 2020-02-28 RX ORDER — LOSARTAN POTASSIUM AND HYDROCHLOROTHIAZIDE 12.5; 1 MG/1; MG/1
TABLET ORAL
Qty: 90 TABLET | Refills: 0 | Status: SHIPPED | OUTPATIENT
Start: 2020-02-28 | End: 2020-04-08

## 2020-02-28 RX ORDER — GLIMEPIRIDE 4 MG/1
TABLET ORAL
Qty: 90 TABLET | Refills: 1 | Status: SHIPPED | OUTPATIENT
Start: 2020-02-28 | End: 2020-08-10

## 2020-02-28 RX ORDER — ATORVASTATIN CALCIUM 10 MG/1
TABLET, FILM COATED ORAL
Qty: 90 TABLET | Refills: 1 | Status: SHIPPED | OUTPATIENT
Start: 2020-02-28 | End: 2020-08-10

## 2020-02-28 RX ORDER — POTASSIUM CHLORIDE 750 MG/1
TABLET, EXTENDED RELEASE ORAL
Qty: 90 TABLET | Refills: 0 | Status: SHIPPED | OUTPATIENT
Start: 2020-02-28 | End: 2020-04-08

## 2020-02-28 RX ORDER — AMLODIPINE BESYLATE 5 MG/1
TABLET ORAL
Qty: 90 TABLET | Refills: 1 | Status: SHIPPED | OUTPATIENT
Start: 2020-02-28 | End: 2020-08-10

## 2020-04-08 RX ORDER — LOSARTAN POTASSIUM AND HYDROCHLOROTHIAZIDE 12.5; 1 MG/1; MG/1
TABLET ORAL
Qty: 90 TABLET | Refills: 0 | Status: SHIPPED | OUTPATIENT
Start: 2020-04-08 | End: 2020-08-10

## 2020-04-08 RX ORDER — POTASSIUM CHLORIDE 750 MG/1
TABLET, EXTENDED RELEASE ORAL
Qty: 90 TABLET | Refills: 0 | Status: SHIPPED | OUTPATIENT
Start: 2020-04-08 | End: 2021-01-13 | Stop reason: SDUPTHER

## 2020-06-02 ENCOUNTER — OFFICE VISIT (OUTPATIENT)
Dept: ENDOCRINOLOGY | Facility: CLINIC | Age: 75
End: 2020-06-02

## 2020-06-02 VITALS
HEART RATE: 77 BPM | HEIGHT: 65 IN | BODY MASS INDEX: 30.39 KG/M2 | SYSTOLIC BLOOD PRESSURE: 160 MMHG | DIASTOLIC BLOOD PRESSURE: 80 MMHG | OXYGEN SATURATION: 99 % | WEIGHT: 182.4 LBS

## 2020-06-02 DIAGNOSIS — E55.9 VITAMIN D DEFICIENCY: ICD-10-CM

## 2020-06-02 DIAGNOSIS — E78.00 PURE HYPERCHOLESTEROLEMIA: ICD-10-CM

## 2020-06-02 DIAGNOSIS — I10 BENIGN ESSENTIAL HYPERTENSION: ICD-10-CM

## 2020-06-02 DIAGNOSIS — E10.42 TYPE 1 DIABETES MELLITUS WITH DIABETIC POLYNEUROPATHY (HCC): Primary | ICD-10-CM

## 2020-06-02 LAB
GLUCOSE BLDC GLUCOMTR-MCNC: 172 MG/DL (ref 70–130)
HBA1C MFR BLD: 7 %

## 2020-06-02 PROCEDURE — 83036 HEMOGLOBIN GLYCOSYLATED A1C: CPT | Performed by: INTERNAL MEDICINE

## 2020-06-02 PROCEDURE — 99214 OFFICE O/P EST MOD 30 MIN: CPT | Performed by: INTERNAL MEDICINE

## 2020-06-02 PROCEDURE — 95251 CONT GLUC MNTR ANALYSIS I&R: CPT | Performed by: INTERNAL MEDICINE

## 2020-06-02 RX ORDER — ASPIRIN 81 MG/1
81 TABLET ORAL DAILY
COMMUNITY

## 2020-06-02 NOTE — PROGRESS NOTES
Chinyere Olvera 75 y.o.  CC:Follow-up; Diabetes (Type I, eye exam was 7-); Hyperlipidemia; and Hypothyroidism      [place]: Follow-up; Diabetes (Type I, eye exam was 7-); Hyperlipidemia; and Hypothyroidism    Blood sugar and 90 day average sugar reviewed  Results for orders placed or performed in visit on 06/02/20   POC Glycosylated Hemoglobin (Hb A1C)   Result Value Ref Range    Hemoglobin A1C 7.0 %   POC Glucose Fingerstick   Result Value Ref Range    Glucose 172 (A) 70 - 130 mg/dL     Average sugar is 150   Is on low fat diet and lipitor 10 mg daily   Energy is good  Bilateral hammmertoes  Bilateral fungal nails   Recheck bp 148/72  She will start checking at home     Allergies   Allergen Reactions   • Penicillins Rash       Current Outpatient Medications:   •  aspirin 81 MG EC tablet, Take 81 mg by mouth Daily., Disp: , Rfl:   •  ACCU-CHEK GUIDE test strip, Test blood sugars QID, Disp: 400 each, Rfl: 3  •  amLODIPine (NORVASC) 5 MG tablet, TAKE 1 TABLET EVERY DAY, Disp: 90 tablet, Rfl: 1  •  atorvastatin (LIPITOR) 10 MG tablet, TAKE 1 TABLET EVERY DAY, Disp: 90 tablet, Rfl: 1  •  Blood Glucose Monitoring Suppl (ACCU-CHEK GUIDE) w/Device kit, 1 Device Daily., Disp: 1 kit, Rfl: 0  •  buPROPion XL (WELLBUTRIN XL) 150 MG 24 hr tablet, , Disp: , Rfl:   •  Calcium Citrate-Vitamin D 250-200 MG-UNIT tablet, , Disp: , Rfl:   •  Continuous Blood Gluc Sensor (FREESTYLE MIKKI 14 DAY SENSOR) mis, 1 Device Every 14 (Fourteen) Days., Disp: 6 each, Rfl: 3  •  Ferrous Fumarate (IRON) 18 MG tablet controlled-release, Take  by mouth daily., Disp: , Rfl:   •  glimepiride (AMARYL) 4 MG tablet, TAKE 1 TABLET EVERY DAY, Disp: 90 tablet, Rfl: 1  •  glucose blood (ONE TOUCH ULTRA TEST) test strip, Test blood sugars QID, Disp: 400 each, Rfl: 3  •  insulin aspart (NOVOLOG FLEXPEN) 100 UNIT/ML solution pen-injector sc pen, Inject 10 Units under the skin into the appropriate area as directed 3 (Three) Times a Day With Meals., Disp:  "30 mL, Rfl: 3  •  Insulin Glargine (LANTUS SOLOSTAR) 100 UNIT/ML injection pen, INJECT 30 UNITS UNDER THE SKIN ONCE NIGHTLY, Disp: 15 pen, Rfl: 0  •  Insulin Pen Needle (B-D ULTRAFINE III SHORT PEN) 31G X 8 MM misc, USE FOUR TIMES DAILY TO INJECT INSULIN, Disp: 400 each, Rfl: 3  •  Insulin Syringe-Needle U-100 (BD INSULIN SYRINGE ULTRAFINE) 31G X 5/16\" 0.5 ML misc, , Disp: , Rfl:   •  lansoprazole (PREVACID) 30 MG capsule, Take  by mouth., Disp: , Rfl:   •  losartan-hydrochlorothiazide (HYZAAR) 100-12.5 MG per tablet, TAKE 1 TABLET EVERY DAY, Disp: 90 tablet, Rfl: 0  •  metoprolol tartrate (LOPRESSOR) 25 MG tablet, Take 12.5 mg by mouth 2 (Two) Times a Day., Disp: , Rfl:   •  Multiple Vitamins-Minerals (MULTIVITAMIN ADULTS 50+) tablet, , Disp: , Rfl:   •  Omega-3 Fatty Acids (FISH OIL) 1000 MG capsule capsule, Take  by mouth daily., Disp: , Rfl:   •  ondansetron (ZOFRAN) 4 MG tablet, Take 1 tablet by mouth Daily As Needed for Nausea or Vomiting., Disp: 30 tablet, Rfl: 1  •  potassium chloride (K-DUR,KLOR-CON) 10 MEQ CR tablet, TAKE 1 TABLET EVERY DAY WITH FOOD, Disp: 90 tablet, Rfl: 0  •  PRODIGY TWIST TOP LANCETS 28G misc, Use 4 per day to test blood sugars, Disp: 400 each, Rfl: 3  •  zinc gluconate 50 MG tablet, zinc gluconate 50 mg tablet  Daily, Disp: , Rfl:   Patient Active Problem List    Diagnosis   • Abdominal bloating [R14.0]   • Preoperative evaluation to rule out surgical contraindication [Z01.818]   • Obstructive sleep apnea syndrome [G47.33]   • Peripheral neuropathy, idiopathic [G60.9]   • Stress fracture of foot [M84.376A]   • Foot pain [M79.673]   • Pain in left foot [M79.672]   • Abnormal liver function tests [R94.5]   • Anemia [D64.9]   • Benign essential hypertension [I10]   • 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 Negative for appetite change, chills, diaphoresis, fatigue, fever and unexpected weight change.   HENT: Negative for congestion, dental problem, drooling, ear discharge, ear pain, facial swelling, hearing loss, mouth sores, nosebleeds, postnasal drip, rhinorrhea, sinus pressure, sneezing, sore throat, tinnitus, trouble swallowing and voice change.    Eyes: Negative for photophobia, pain, discharge, redness, itching and visual disturbance.   Respiratory: Negative for apnea, cough, choking, chest tightness, shortness of breath, wheezing and stridor.    Cardiovascular: Negative for chest pain, palpitations and leg swelling.   Gastrointestinal: Negative for abdominal distention, abdominal pain, anal bleeding, blood in stool, constipation, diarrhea, nausea, rectal pain and vomiting.   Endocrine: Negative for cold intolerance, heat intolerance, polydipsia, polyphagia and polyuria.   Genitourinary: Negative for decreased urine volume, difficulty urinating, dysuria, enuresis, flank pain, frequency, genital sores, hematuria and urgency.   Musculoskeletal: Positive for arthralgias. Negative for back pain, gait problem, joint swelling, myalgias, neck pain and neck stiffness.   Skin: Negative for color change, pallor, rash and wound.        Bilateral gt toe fungal nails    Allergic/Immunologic: Negative for environmental allergies, food allergies and immunocompromised state.   Neurological: Negative for dizziness, tremors, seizures, syncope, facial asymmetry, speech difficulty, weakness, light-headedness, numbness and headaches.   Hematological: Negative for adenopathy. Does not bruise/bleed easily.   Psychiatric/Behavioral: Negative for agitation, behavioral problems, confusion, decreased concentration, dysphoric  "mood, hallucinations, self-injury, sleep disturbance and suicidal ideas. The patient is not nervous/anxious and is not hyperactive.      Social History     Socioeconomic History   • Marital status:      Spouse name: Not on file   • Number of children: Not on file   • Years of education: Not on file   • Highest education level: Not on file   Tobacco Use   • Smoking status: Former Smoker   • Smokeless tobacco: Never Used   Substance and Sexual Activity   • Alcohol use: Yes     Alcohol/week: 7.0 standard drinks     Types: 7 Glasses of wine per week   • Drug use: No   • Sexual activity: Defer     Family History   Problem Relation Age of Onset   • Diabetes Father    • Diabetes Maternal Grandfather    • Coronary artery disease Other      /80   Pulse 77   Ht 165.1 cm (65\")   Wt 82.7 kg (182 lb 6.4 oz)   SpO2 99%   BMI 30.35 kg/m²   Physical Exam   Constitutional: She is oriented to person, place, and time. She appears well-developed and well-nourished.   HENT:   Head: Normocephalic and atraumatic.   Nose: Nose normal.   Mouth/Throat: Oropharynx is clear and moist.   Eyes: Pupils are equal, round, and reactive to light. Conjunctivae, EOM and lids are normal.   Neck: Trachea normal and normal range of motion. Neck supple. Carotid bruit is not present. No tracheal deviation present. No thyroid mass and no thyromegaly present.   Cardiovascular: Normal rate, regular rhythm, normal heart sounds and intact distal pulses. Exam reveals no gallop and no friction rub.   No murmur heard.  Pulmonary/Chest: Effort normal and breath sounds normal. No respiratory distress. She has no wheezes.   Musculoskeletal: Normal range of motion. She exhibits no edema or deformity.    Chinyere had a diabetic foot exam performed today.   During the foot exam she had a monofilament test performed.    Neurological Sensory Findings - Unaltered hot/cold right ankle/foot discrimination and unaltered hot/cold left ankle/foot discrimination. " Unaltered sharp/dull right ankle/foot discrimination and unaltered sharp/dull left ankle/foot discrimination. No right ankle/foot altered proprioception and no left ankle/foot altered proprioception  Vascular Status -  Her right foot exhibits normal foot vasculature  and no edema. Her left foot exhibits normal foot vasculature  and no edema.  Skin Integrity  -  Her right foot skin is intact.Her left foot skin is intact..     Lymphadenopathy:     She has no cervical adenopathy.   Neurological: She is alert and oriented to person, place, and time. She has normal reflexes. She displays normal reflexes. No cranial nerve deficit.   Skin: Skin is warm and dry. No rash noted. No cyanosis or erythema. Nails show no clubbing.   Psychiatric: She has a normal mood and affect. Her speech is normal and behavior is normal. Judgment and thought content normal. Cognition and memory are normal.   Nursing note and vitals reviewed.    Results for orders placed or performed in visit on 01/31/20   POC Glycosylated Hemoglobin (Hb A1C)   Result Value Ref Range    Hemoglobin A1C 6.4 %   POC Glucose Fingerstick   Result Value Ref Range    Glucose 170 (A) 70 - 130 mg/dL     Problem List Items Addressed This Visit        Cardiovascular and Mediastinum    Hyperlipidemia     Is on low fat diet and lipitor 10 mg daily   Last LDL 68   Continue monitoring and medication          Benign essential hypertension     bp is high- continue current medications  Recheck improved- check bp at home and call if over 145/85  States she was stressed coming to office - forgot sensor, etc and had to rush             Digestive    Vitamin D deficiency     On vitamin D supplement  Continue monitoring and medication             Endocrine    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) - Primary     Blood sugar and 90 day average sugar reviewed  Results for orders placed or performed in visit on 06/02/20   POC Glycosylated Hemoglobin (Hb A1C)    Result Value Ref Range    Hemoglobin A1C 7.0 %   POC Glucose Fingerstick   Result Value Ref Range    Glucose 172 (A) 70 - 130 mg/dL     Average sugar is 150   Is utd with eye exam  Neuropathy with hammertoes, fungal nail infection bilateral gt toes  She is treating these with improvement   Ur alb neg   F/u 3-4 months   Downloaded royce and reviewed  Lower sugars in the morning and we discussed reducing lantus by 2 units  Snack at hs if blood sugar is below 120  71% of sugars are in target range   12 days of glucose data reviewed and discussed   She is sensing low sugars well   She loves the royce and feels that it has helped her blood sugar control a lot          Relevant Orders    POC Glycosylated Hemoglobin (Hb A1C) (Completed)    POC Glucose Fingerstick (Completed)        Return in about 3 months (around 9/2/2020) for Recheck 30 min .    Renetta Rosario MA  Signed Maria Eugenia Lawrence MD

## 2020-06-03 NOTE — ASSESSMENT & PLAN NOTE
bp is high- continue current medications  Recheck improved- check bp at home and call if over 145/85  States she was stressed coming to office - forgot sensor, etc and had to rush

## 2020-06-03 NOTE — ASSESSMENT & PLAN NOTE
Blood sugar and 90 day average sugar reviewed  Results for orders placed or performed in visit on 06/02/20   POC Glycosylated Hemoglobin (Hb A1C)   Result Value Ref Range    Hemoglobin A1C 7.0 %   POC Glucose Fingerstick   Result Value Ref Range    Glucose 172 (A) 70 - 130 mg/dL     Average sugar is 150   Is utd with eye exam  Neuropathy with hammertoes, fungal nail infection bilateral gt toes  She is treating these with improvement   Ur alb neg   F/u 3-4 months   Downloaded royce and reviewed  Lower sugars in the morning and we discussed reducing lantus by 2 units  Snack at hs if blood sugar is below 120  71% of sugars are in target range   12 days of glucose data reviewed and discussed   She is sensing low sugars well   She loves the royce and feels that it has helped her blood sugar control a lot

## 2020-07-29 ENCOUNTER — TELEPHONE (OUTPATIENT)
Dept: ENDOCRINOLOGY | Facility: CLINIC | Age: 75
End: 2020-07-29

## 2020-08-10 RX ORDER — FLASH GLUCOSE SENSOR
KIT MISCELLANEOUS
Qty: 6 EACH | Refills: 3 | Status: SHIPPED | OUTPATIENT
Start: 2020-08-10 | End: 2021-01-13 | Stop reason: SDUPTHER

## 2020-08-10 RX ORDER — GLIMEPIRIDE 4 MG/1
TABLET ORAL
Qty: 90 TABLET | Refills: 1 | Status: SHIPPED | OUTPATIENT
Start: 2020-08-10 | End: 2020-09-24

## 2020-08-10 RX ORDER — AMLODIPINE BESYLATE 5 MG/1
TABLET ORAL
Qty: 90 TABLET | Refills: 1 | Status: SHIPPED | OUTPATIENT
Start: 2020-08-10 | End: 2020-11-12 | Stop reason: SDUPTHER

## 2020-08-10 RX ORDER — ATORVASTATIN CALCIUM 10 MG/1
TABLET, FILM COATED ORAL
Qty: 90 TABLET | Refills: 1 | Status: SHIPPED | OUTPATIENT
Start: 2020-08-10 | End: 2021-01-13 | Stop reason: SDUPTHER

## 2020-08-10 RX ORDER — LOSARTAN POTASSIUM AND HYDROCHLOROTHIAZIDE 12.5; 1 MG/1; MG/1
TABLET ORAL
Qty: 90 TABLET | Refills: 0 | Status: SHIPPED | OUTPATIENT
Start: 2020-08-10 | End: 2020-11-12 | Stop reason: SDUPTHER

## 2020-09-24 ENCOUNTER — OFFICE VISIT (OUTPATIENT)
Dept: ENDOCRINOLOGY | Facility: CLINIC | Age: 75
End: 2020-09-24

## 2020-09-24 ENCOUNTER — LAB REQUISITION (OUTPATIENT)
Dept: LAB | Facility: HOSPITAL | Age: 75
End: 2020-09-24

## 2020-09-24 VITALS
BODY MASS INDEX: 30.35 KG/M2 | SYSTOLIC BLOOD PRESSURE: 140 MMHG | HEIGHT: 65 IN | WEIGHT: 182.2 LBS | DIASTOLIC BLOOD PRESSURE: 84 MMHG | HEART RATE: 70 BPM

## 2020-09-24 DIAGNOSIS — E10.42 TYPE 1 DIABETES MELLITUS WITH DIABETIC POLYNEUROPATHY (HCC): Primary | ICD-10-CM

## 2020-09-24 DIAGNOSIS — E78.00 PURE HYPERCHOLESTEROLEMIA: ICD-10-CM

## 2020-09-24 DIAGNOSIS — Z00.00 ROUTINE GENERAL MEDICAL EXAMINATION AT A HEALTH CARE FACILITY: ICD-10-CM

## 2020-09-24 DIAGNOSIS — I10 BENIGN ESSENTIAL HYPERTENSION: ICD-10-CM

## 2020-09-24 LAB
EXPIRATION DATE: ABNORMAL
EXPIRATION DATE: NORMAL
GLUCOSE BLDC GLUCOMTR-MCNC: 155 MG/DL (ref 70–130)
HBA1C MFR BLD: 6.4 %
Lab: ABNORMAL
Lab: NORMAL

## 2020-09-24 PROCEDURE — 90694 VACC AIIV4 NO PRSRV 0.5ML IM: CPT | Performed by: INTERNAL MEDICINE

## 2020-09-24 PROCEDURE — 99214 OFFICE O/P EST MOD 30 MIN: CPT | Performed by: INTERNAL MEDICINE

## 2020-09-24 PROCEDURE — 95251 CONT GLUC MNTR ANALYSIS I&R: CPT | Performed by: INTERNAL MEDICINE

## 2020-09-24 PROCEDURE — G0008 ADMIN INFLUENZA VIRUS VAC: HCPCS | Performed by: INTERNAL MEDICINE

## 2020-09-24 PROCEDURE — 83036 HEMOGLOBIN GLYCOSYLATED A1C: CPT | Performed by: INTERNAL MEDICINE

## 2020-09-24 NOTE — PROGRESS NOTES
Chinyere Olvera 75 y.o.  CC:Follow-up, Diabetes (Type I, eye exam August 2020 Dr Fink), Hyperlipidemia, and Hypothyroidism      [place]: Follow-up, Diabetes (Type I, eye exam August 2020 Dr Fink), Hyperlipidemia, and Hypothyroidism    Blood sugar and 90 day average sugar reviewed  Results for orders placed or performed in visit on 09/24/20   POC Glycosylated Hemoglobin (Hb A1C)    Specimen: Blood   Result Value Ref Range    Hemoglobin A1C 6.4 %    Lot Number [id number]     Expiration Date 04/22/2022    POC Glucose, Blood    Specimen: Blood   Result Value Ref Range    Glucose 155 (A) 70 - 130 mg/dL    Lot Number [id number]     Expiration Date 12/31/2020      Eating dinner before 7 pm   Is doing intermittent fasting   Thick left nail  Working on toenail fungus  Bilateral second hammer toe  Is utd with eye exam- Dr Fink  Discussed blood sugar average with her- is in good range  Is on low fat diet and lipitor 10 mg daily   Downloaded royce sensor and reviewed with her   76% of sugars were in target range   18% of sugars were high and 3# of sugars were very low  Low sugars usually between midnight and 6 am   12 days of sensor data reviewed     Allergies   Allergen Reactions   • Penicillins Rash       Current Outpatient Medications:   •  ACCU-CHEK GUIDE test strip, Test blood sugars QID, Disp: 400 each, Rfl: 3  •  amLODIPine (NORVASC) 5 MG tablet, TAKE 1 TABLET EVERY DAY, Disp: 90 tablet, Rfl: 1  •  aspirin 81 MG EC tablet, Take 81 mg by mouth Daily., Disp: , Rfl:   •  atorvastatin (LIPITOR) 10 MG tablet, TAKE 1 TABLET EVERY DAY, Disp: 90 tablet, Rfl: 1  •  Blood Glucose Monitoring Suppl (ACCU-CHEK GUIDE) w/Device kit, 1 Device Daily., Disp: 1 kit, Rfl: 0  •  buPROPion XL (WELLBUTRIN XL) 150 MG 24 hr tablet, , Disp: , Rfl:   •  Calcium Citrate-Vitamin D 250-200 MG-UNIT tablet, , Disp: , Rfl:   •  Continuous Blood Gluc Sensor (WevebobSTYLE ROYCE 14 DAY SENSOR) [facility] [place] CHANGE DEVICE EVERY 14 DAYS AS DIRECTED., Disp: 6 each,  "Rfl: 3  •  Ferrous Fumarate (IRON) 18 MG tablet controlled-release, Take  by mouth daily., Disp: , Rfl:   •  glucose blood (ONE TOUCH ULTRA TEST) test strip, Test blood sugars QID, Disp: 400 each, Rfl: 3  •  insulin aspart (NOVOLOG FLEXPEN) 100 UNIT/ML solution pen-injector sc pen, Inject 10 Units under the skin into the appropriate area as directed 3 (Three) Times a Day With Meals. (Patient taking differently: Inject 9 Units under the skin into the appropriate area as directed 3 (Three) Times a Day With Meals.), Disp: 30 mL, Rfl: 3  •  Insulin Glargine (Lantus SoloStar) 100 UNIT/ML injection pen, INJECT 30 UNITS UNDER THE SKIN ONCE NIGHTLY, Disp: 15 pen, Rfl: 1  •  Insulin Pen Needle (B-D ULTRAFINE III SHORT PEN) 31G X 8 MM misc, USE FOUR TIMES DAILY TO INJECT INSULIN, Disp: 400 each, Rfl: 3  •  Insulin Syringe-Needle U-100 (BD INSULIN SYRINGE ULTRAFINE) 31G X 5/16\" 0.5 ML misc, , Disp: , Rfl:   •  lansoprazole (PREVACID) 30 MG capsule, Take  by mouth., Disp: , Rfl:   •  losartan-hydrochlorothiazide (HYZAAR) 100-12.5 MG per tablet, TAKE 1 TABLET EVERY DAY, Disp: 90 tablet, Rfl: 0  •  metoprolol tartrate (LOPRESSOR) 25 MG tablet, Take 12.5 mg by mouth 2 (Two) Times a Day., Disp: , Rfl:   •  Multiple Vitamins-Minerals (MULTIVITAMIN ADULTS 50+) tablet, , Disp: , Rfl:   •  Omega-3 Fatty Acids (FISH OIL) 1000 MG capsule capsule, Take  by mouth daily., Disp: , Rfl:   •  ondansetron (ZOFRAN) 4 MG tablet, Take 1 tablet by mouth Daily As Needed for Nausea or Vomiting., Disp: 30 tablet, Rfl: 1  •  potassium chloride (K-DUR,KLOR-CON) 10 MEQ CR tablet, TAKE 1 TABLET EVERY DAY WITH FOOD, Disp: 90 tablet, Rfl: 0  •  PRODIGY TWIST TOP LANCETS 28G misc, Use 4 per day to test blood sugars, Disp: 400 each, Rfl: 3  •  zinc gluconate 50 MG tablet, zinc gluconate 50 mg tablet  Daily, Disp: , Rfl:   Patient Active Problem List    Diagnosis   • Abdominal bloating [R14.0]   • Preoperative evaluation to rule out surgical contraindication " [Z01.818]   • Obstructive sleep apnea syndrome [G47.33]   • Peripheral neuropathy, idiopathic [G60.9]   • Stress fracture of foot [M84.376A]   • Foot pain [M79.673]   • Pain in left foot [M79.672]   • Abnormal liver function tests [R94.5]   • Anemia [D64.9]   • Benign essential hypertension [I10]   • 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 Negative for appetite change, chills, diaphoresis, fatigue, fever and unexpected weight change.   HENT: Negative for congestion, dental problem, drooling, ear discharge, ear pain, facial swelling, hearing loss, mouth sores, nosebleeds, postnasal drip, rhinorrhea, sinus pressure, sneezing, sore throat, tinnitus, trouble swallowing and voice change.    Eyes: Negative for photophobia, pain, discharge, redness, itching and visual disturbance.   Respiratory: Negative for apnea, cough, choking, chest tightness, shortness of breath, wheezing and stridor.    Cardiovascular: Negative for chest pain, palpitations and leg swelling.   Gastrointestinal: Negative for abdominal distention, abdominal pain, anal bleeding, blood in stool, constipation, diarrhea, nausea, rectal pain and vomiting.   Endocrine: Negative for cold intolerance, heat intolerance, polydipsia, polyphagia and polyuria.   Genitourinary: Negative for decreased urine volume, difficulty urinating, dysuria, enuresis, flank pain, frequency, genital sores, hematuria and urgency.   Musculoskeletal: Positive for arthralgias. Negative for back pain, gait problem, joint swelling, myalgias, neck pain and neck stiffness.   Skin: Negative for color  "change, pallor, rash and wound.        Callus dorsal second toe    Allergic/Immunologic: Negative for environmental allergies, food allergies and immunocompromised state.   Neurological: Positive for numbness. Negative for dizziness, tremors, seizures, syncope, facial asymmetry, speech difficulty, weakness, light-headedness and headaches.   Hematological: Negative for adenopathy. Does not bruise/bleed easily.   Psychiatric/Behavioral: Negative for agitation, behavioral problems, confusion, decreased concentration, dysphoric mood, hallucinations, self-injury, sleep disturbance and suicidal ideas. The patient is not nervous/anxious and is not hyperactive.      Social History     Socioeconomic History   • Marital status:      Spouse name: Not on file   • Number of children: Not on file   • Years of education: Not on file   • Highest education level: Not on file   Tobacco Use   • Smoking status: Former Smoker   • Smokeless tobacco: Never Used   Substance and Sexual Activity   • Alcohol use: Yes     Alcohol/week: 7.0 standard drinks     Types: 7 Glasses of wine per week   • Drug use: No   • Sexual activity: Defer     Family History   Problem Relation Age of Onset   • Diabetes Father    • Diabetes Maternal Grandfather    • Coronary artery disease Other      /84   Pulse 70   Ht 165.1 cm (65\")   Wt 82.6 kg (182 lb 3.2 oz)   BMI 30.32 kg/m²   Physical Exam  Vitals signs and nursing note reviewed.   Constitutional:       Appearance: Normal appearance. She is well-developed.   HENT:      Head: Normocephalic and atraumatic.   Eyes:      General: Lids are normal.      Conjunctiva/sclera: Conjunctivae normal.      Pupils: Pupils are equal, round, and reactive to light.   Neck:      Musculoskeletal: Normal range of motion and neck supple.      Thyroid: No thyroid mass or thyromegaly.      Vascular: No carotid bruit.      Trachea: Trachea normal. No tracheal deviation.   Cardiovascular:      Rate and Rhythm: Normal " rate and regular rhythm.      Heart sounds: Normal heart sounds. No murmur. No friction rub. No gallop.    Pulmonary:      Effort: Pulmonary effort is normal. No respiratory distress.      Breath sounds: Normal breath sounds. No wheezing.   Musculoskeletal: Normal range of motion.         General: No deformity.        Feet:    Feet:      Right foot:      Skin integrity: Callus present.      Toenail Condition: Right toenails are abnormally thick.      Left foot:      Skin integrity: Callus present.      Toenail Condition: Left toenails are normal.   Lymphadenopathy:      Cervical: No cervical adenopathy.   Skin:     General: Skin is warm and dry.      Findings: No erythema or rash.      Nails: There is no clubbing.     Neurological:      Mental Status: She is alert and oriented to person, place, and time.      Cranial Nerves: No cranial nerve deficit.      Deep Tendon Reflexes: Reflexes abnormal.   Psychiatric:         Speech: Speech normal.         Behavior: Behavior normal.         Thought Content: Thought content normal.         Judgment: Judgment normal.       Results for orders placed or performed in visit on 09/24/20   POC Glycosylated Hemoglobin (Hb A1C)    Specimen: Blood   Result Value Ref Range    Hemoglobin A1C 6.4 %    Lot Number [id number]     Expiration Date 04/22/2022    POC Glucose, Blood    Specimen: Blood   Result Value Ref Range    Glucose 155 (A) 70 - 130 mg/dL    Lot Number [id number]     Expiration Date 12/31/2020      Chinyere was seen today for follow-up, diabetes, hyperlipidemia and hypothyroidism.    Diagnoses and all orders for this visit:    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 (CMS/HCC)  -     POC Glycosylated Hemoglobin (Hb A1C)  -     POC Glucose, Blood  -     Comprehensive Metabolic Panel  -     Microalbumin / Creatinine Urine Ratio - Urine, Clean Catch    Benign essential hypertension    Pure hypercholesterolemia  -     Lipid Panel  -     TSH    Other orders  -     Fluad  Quad 65+ yrs ([id number])      Problem List Items Addressed This Visit        Cardiovascular and Mediastinum    Hyperlipidemia     Check flp   Is on low fat diet and lipitor 10 mg daily          Relevant Orders    Lipid Panel    TSH    Benign essential hypertension     bp is good   Continue current monitoring and medication             Endocrine    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) - Primary     Blood sugar and 90 day average sugar reviewed  Results for orders placed or performed in visit on 09/24/20   POC Glycosylated Hemoglobin (Hb A1C)    Specimen: Blood   Result Value Ref Range    Hemoglobin A1C 6.4 %    Lot Number [id number]     Expiration Date 04/22/2022    POC Glucose, Blood    Specimen: Blood   Result Value Ref Range    Glucose 155 (A) 70 - 130 mg/dL    Lot Number [id number]     Expiration Date 12/31/2020      Average sugar is 135   Is utd with eye exam   Stable neuropathy with hammertoe and fungal nail   Ur alb neg   Continue monitoring   Stop amaryl - low overnight sugar on download royce - likely due to glimepiride  Discussed with her and discussed royce data review  76% of sugars are in range   Risk of nocturnal low sugars discussed  12 days of data from royce reviewed          Relevant Orders    POC Glycosylated Hemoglobin (Hb A1C) (Completed)    POC Glucose, Blood (Completed)    Comprehensive Metabolic Panel    Microalbumin / Creatinine Urine Ratio - Urine, Clean Catch        Return in about 4 months (around 1/24/2021) for Recheck.    Maria Eugenia Lawrence MD  Signed Maria Eugenia Lawrence MD

## 2020-09-25 NOTE — ASSESSMENT & PLAN NOTE
Blood sugar and 90 day average sugar reviewed  Results for orders placed or performed in visit on 09/24/20   POC Glycosylated Hemoglobin (Hb A1C)    Specimen: Blood   Result Value Ref Range    Hemoglobin A1C 6.4 %    Lot Number [id number]     Expiration Date 04/22/2022    POC Glucose, Blood    Specimen: Blood   Result Value Ref Range    Glucose 155 (A) 70 - 130 mg/dL    Lot Number [id number]     Expiration Date 12/31/2020      Average sugar is 135   Is utd with eye exam   Stable neuropathy with hammertoe and fungal nail   Ur alb neg   Continue monitoring   Stop amaryl - low overnight sugar on download royce - likely due to glimepiride  Discussed with her and discussed royce data review  76% of sugars are in range   Risk of nocturnal low sugars discussed  12 days of data from royce reviewed

## 2020-09-26 LAB
ALBUMIN SERPL-MCNC: 4.9 G/DL (ref 3.5–5.2)
ALBUMIN/CREAT UR: 38 MG/G CREAT (ref 0–29)
ALBUMIN/GLOB SERPL: 3.3 G/DL
ALP SERPL-CCNC: 75 U/L (ref 39–117)
ALT SERPL-CCNC: 20 U/L (ref 1–33)
AST SERPL-CCNC: 17 U/L (ref 1–32)
BILIRUB SERPL-MCNC: 0.6 MG/DL (ref 0–1.2)
BUN SERPL-MCNC: 22 MG/DL (ref 8–23)
BUN/CREAT SERPL: 18 (ref 7–25)
CALCIUM SERPL-MCNC: 9.4 MG/DL (ref 8.6–10.5)
CHLORIDE SERPL-SCNC: 101 MMOL/L (ref 98–107)
CHOLEST SERPL-MCNC: 142 MG/DL (ref 0–200)
CO2 SERPL-SCNC: 27 MMOL/L (ref 22–29)
CREAT SERPL-MCNC: 1.22 MG/DL (ref 0.57–1)
CREAT UR-MCNC: 50.9 MG/DL
GLOBULIN SER CALC-MCNC: 1.5 GM/DL
GLUCOSE SERPL-MCNC: 146 MG/DL (ref 65–99)
HDLC SERPL-MCNC: 45 MG/DL (ref 40–60)
LDLC SERPL CALC-MCNC: 73 MG/DL (ref 0–100)
MICROALBUMIN UR-MCNC: 19.1 UG/ML
POTASSIUM SERPL-SCNC: 4.1 MMOL/L (ref 3.5–5.2)
PROT SERPL-MCNC: 6.4 G/DL (ref 6–8.5)
SODIUM SERPL-SCNC: 137 MMOL/L (ref 136–145)
TRIGL SERPL-MCNC: 120 MG/DL (ref 0–150)
TSH SERPL DL<=0.005 MIU/L-ACNC: 1.96 UIU/ML (ref 0.27–4.2)
VLDLC SERPL CALC-MCNC: 24 MG/DL

## 2020-11-12 RX ORDER — AMLODIPINE BESYLATE 5 MG/1
5 TABLET ORAL DAILY
Qty: 90 TABLET | Refills: 1 | Status: SHIPPED | OUTPATIENT
Start: 2020-11-12 | End: 2021-01-13 | Stop reason: SDUPTHER

## 2020-11-12 RX ORDER — LOSARTAN POTASSIUM AND HYDROCHLOROTHIAZIDE 12.5; 1 MG/1; MG/1
1 TABLET ORAL DAILY
Qty: 90 TABLET | Refills: 1 | Status: SHIPPED | OUTPATIENT
Start: 2020-11-12 | End: 2021-01-13 | Stop reason: SDUPTHER

## 2020-11-12 NOTE — TELEPHONE ENCOUNTER
THEY ARE CALLING TO SEE IF DR. LE WOULD PRESCRIBE AND CONTINUE TO PRESCRIBE PATIENTS BLOOD PRESSURE MEDICATIONS. PATIENT HAS UNCONTROLLED BLOOD PRESSURE SINCE 6/02/2020. DR. LE PRESCRIBED 2 OUT OF THE THREE MEDICATION. THEY WOULD LIKE DR. LE TO CONTINUE TO FILL THESE MEDICATIONS FOR PATIENTS UNCONTROLLED DIABETES. PHONE NUMBER [phone]. [place] [id number]

## 2020-11-17 RX ORDER — INSULIN GLARGINE 100 [IU]/ML
INJECTION, SOLUTION SUBCUTANEOUS
Qty: 15 ML | Refills: 5 | Status: SHIPPED | OUTPATIENT
Start: 2020-11-17 | End: 2021-01-13 | Stop reason: SDUPTHER

## 2021-01-13 ENCOUNTER — OFFICE VISIT (OUTPATIENT)
Dept: ENDOCRINOLOGY | Facility: CLINIC | Age: 76
End: 2021-01-13

## 2021-01-13 VITALS
BODY MASS INDEX: 30.16 KG/M2 | OXYGEN SATURATION: 98 % | WEIGHT: 181 LBS | TEMPERATURE: 97.3 F | HEART RATE: 83 BPM | HEIGHT: 65 IN

## 2021-01-13 DIAGNOSIS — I10 BENIGN ESSENTIAL HYPERTENSION: ICD-10-CM

## 2021-01-13 DIAGNOSIS — E10.42 TYPE 1 DIABETES MELLITUS WITH DIABETIC POLYNEUROPATHY (HCC): Primary | ICD-10-CM

## 2021-01-13 DIAGNOSIS — E78.00 PURE HYPERCHOLESTEROLEMIA: ICD-10-CM

## 2021-01-13 LAB
EXPIRATION DATE: ABNORMAL
EXPIRATION DATE: NORMAL
GLUCOSE BLDC GLUCOMTR-MCNC: 170 MG/DL (ref 70–130)
HBA1C MFR BLD: 7.6 %
Lab: ABNORMAL
Lab: NORMAL

## 2021-01-13 PROCEDURE — 99214 OFFICE O/P EST MOD 30 MIN: CPT | Performed by: INTERNAL MEDICINE

## 2021-01-13 PROCEDURE — 83036 HEMOGLOBIN GLYCOSYLATED A1C: CPT | Performed by: INTERNAL MEDICINE

## 2021-01-13 PROCEDURE — 95251 CONT GLUC MNTR ANALYSIS I&R: CPT | Performed by: INTERNAL MEDICINE

## 2021-01-13 RX ORDER — INSULIN ASPART 100 [IU]/ML
10 INJECTION, SOLUTION INTRAVENOUS; SUBCUTANEOUS
Qty: 30 ML | Refills: 3 | Status: SHIPPED | OUTPATIENT
Start: 2021-01-13 | End: 2021-04-27 | Stop reason: SDUPTHER

## 2021-01-13 RX ORDER — POTASSIUM CHLORIDE 750 MG/1
10 TABLET, EXTENDED RELEASE ORAL 2 TIMES DAILY WITH MEALS
Qty: 90 TABLET | Refills: 0 | Status: SHIPPED | OUTPATIENT
Start: 2021-01-13 | End: 2021-05-18 | Stop reason: SDUPTHER

## 2021-01-13 RX ORDER — FLASH GLUCOSE SENSOR
1 KIT MISCELLANEOUS
Qty: 6 EACH | Refills: 3 | Status: SHIPPED | OUTPATIENT
Start: 2021-01-13 | End: 2021-01-21 | Stop reason: SDUPTHER

## 2021-01-13 RX ORDER — AMLODIPINE BESYLATE 5 MG/1
5 TABLET ORAL DAILY
Qty: 90 TABLET | Refills: 1 | Status: SHIPPED | OUTPATIENT
Start: 2021-01-13 | End: 2021-04-27 | Stop reason: SDUPTHER

## 2021-01-13 RX ORDER — LOSARTAN POTASSIUM AND HYDROCHLOROTHIAZIDE 12.5; 1 MG/1; MG/1
1 TABLET ORAL DAILY
Qty: 90 TABLET | Refills: 1 | Status: SHIPPED | OUTPATIENT
Start: 2021-01-13 | End: 2022-01-14 | Stop reason: SDUPTHER

## 2021-01-13 RX ORDER — INSULIN GLARGINE 100 [IU]/ML
INJECTION, SOLUTION SUBCUTANEOUS
Qty: 15 ML | Refills: 5 | Status: SHIPPED | OUTPATIENT
Start: 2021-01-13 | End: 2021-04-27 | Stop reason: SDUPTHER

## 2021-01-13 RX ORDER — ATORVASTATIN CALCIUM 10 MG/1
10 TABLET, FILM COATED ORAL DAILY
Qty: 90 TABLET | Refills: 1 | Status: SHIPPED | OUTPATIENT
Start: 2021-01-13 | End: 2021-04-27 | Stop reason: SDUPTHER

## 2021-01-13 NOTE — ASSESSMENT & PLAN NOTE
Vitals reviewed  bp is normal at home  Continue current medication and monitoring   On lopressor- pulse 83  .

## 2021-01-13 NOTE — ASSESSMENT & PLAN NOTE
Blood sugar and 90 day average sugar reviewed and are higher compared to recent testing  Results for orders placed or performed in visit on 01/13/21   POC Glucose, Blood    Specimen: Blood   Result Value Ref Range    Glucose 170 (A) 70 - 130 mg/dL    Lot Number [id number]     Expiration Date 07/01/2021    POC Glycosylated Hemoglobin (Hb A1C)    Specimen: Blood   Result Value Ref Range    Hemoglobin A1C 7.6 %    Lot Number [id number]     Expiration Date 09/29/2022      Continue sensor/pump   Avoid snacking at night   Goal a1c 7 or less  Goal sensor 70% or more at target  Foot care reviewed  Adjustment of insulin for larger meals discussed  premeal adjustment for higher blood sugar reviewed  Update eye exam  F/u 3-4 months
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
## 2021-01-21 RX ORDER — FLASH GLUCOSE SENSOR
1 KIT MISCELLANEOUS
Qty: 6 EACH | Refills: 3 | Status: SHIPPED | OUTPATIENT
Start: 2021-01-21 | End: 2021-12-10 | Stop reason: SDUPTHER

## 2021-01-21 NOTE — TELEPHONE ENCOUNTER
[facility] requested rx for freestyle sensors be corrected:  It was listed as subQ and not externally

## 2021-01-22 ENCOUNTER — IMMUNIZATION (OUTPATIENT)
Dept: VACCINE CLINIC | Facility: HOSPITAL | Age: 76
End: 2021-01-22

## 2021-01-22 PROCEDURE — 0001A: CPT | Performed by: THORACIC SURGERY (CARDIOTHORACIC VASCULAR SURGERY)

## 2021-01-22 PROCEDURE — 91300 HC SARSCOV02 VAC 30MCG/0.3ML IM: CPT | Performed by: THORACIC SURGERY (CARDIOTHORACIC VASCULAR SURGERY)

## 2021-02-12 ENCOUNTER — IMMUNIZATION (OUTPATIENT)
Dept: VACCINE CLINIC | Facility: HOSPITAL | Age: 76
End: 2021-02-12

## 2021-02-12 PROCEDURE — 0002A: CPT | Performed by: THORACIC SURGERY (CARDIOTHORACIC VASCULAR SURGERY)

## 2021-02-12 PROCEDURE — 91300 HC SARSCOV02 VAC 30MCG/0.3ML IM: CPT | Performed by: THORACIC SURGERY (CARDIOTHORACIC VASCULAR SURGERY)

## 2021-04-27 ENCOUNTER — OFFICE VISIT (OUTPATIENT)
Dept: ENDOCRINOLOGY | Facility: CLINIC | Age: 76
End: 2021-04-27

## 2021-04-27 VITALS
HEIGHT: 64 IN | TEMPERATURE: 97.7 F | WEIGHT: 183.8 LBS | DIASTOLIC BLOOD PRESSURE: 80 MMHG | SYSTOLIC BLOOD PRESSURE: 144 MMHG | BODY MASS INDEX: 31.38 KG/M2

## 2021-04-27 DIAGNOSIS — E78.00 PURE HYPERCHOLESTEROLEMIA: ICD-10-CM

## 2021-04-27 DIAGNOSIS — E11.65 UNCONTROLLED TYPE 2 DIABETES MELLITUS WITH HYPERGLYCEMIA (HCC): Primary | ICD-10-CM

## 2021-04-27 DIAGNOSIS — E10.42 TYPE 1 DIABETES MELLITUS WITH DIABETIC POLYNEUROPATHY (HCC): ICD-10-CM

## 2021-04-27 DIAGNOSIS — I10 BENIGN ESSENTIAL HYPERTENSION: ICD-10-CM

## 2021-04-27 LAB
EXPIRATION DATE: NORMAL
GLUCOSE BLDC GLUCOMTR-MCNC: 98 MG/DL (ref 70–130)
HBA1C MFR BLD: 7 %
Lab: NORMAL

## 2021-04-27 PROCEDURE — 83036 HEMOGLOBIN GLYCOSYLATED A1C: CPT | Performed by: INTERNAL MEDICINE

## 2021-04-27 PROCEDURE — 82947 ASSAY GLUCOSE BLOOD QUANT: CPT | Performed by: INTERNAL MEDICINE

## 2021-04-27 PROCEDURE — 99214 OFFICE O/P EST MOD 30 MIN: CPT | Performed by: INTERNAL MEDICINE

## 2021-04-27 RX ORDER — INSULIN ASPART 100 [IU]/ML
10 INJECTION, SOLUTION INTRAVENOUS; SUBCUTANEOUS
Qty: 30 ML | Refills: 3 | Status: SHIPPED | OUTPATIENT
Start: 2021-04-27 | End: 2021-08-31 | Stop reason: SDUPTHER

## 2021-04-27 RX ORDER — AMLODIPINE BESYLATE 5 MG/1
5 TABLET ORAL DAILY
Qty: 90 TABLET | Refills: 1 | Status: SHIPPED | OUTPATIENT
Start: 2021-04-27 | End: 2022-02-22 | Stop reason: SDUPTHER

## 2021-04-27 RX ORDER — INSULIN GLARGINE 100 [IU]/ML
INJECTION, SOLUTION SUBCUTANEOUS
Qty: 15 ML | Refills: 5 | Status: SHIPPED | OUTPATIENT
Start: 2021-04-27 | End: 2021-06-18 | Stop reason: SDUPTHER

## 2021-04-27 RX ORDER — ATORVASTATIN CALCIUM 10 MG/1
10 TABLET, FILM COATED ORAL DAILY
Qty: 90 TABLET | Refills: 1 | Status: SHIPPED | OUTPATIENT
Start: 2021-04-27 | End: 2022-02-22 | Stop reason: SDUPTHER

## 2021-04-27 NOTE — PROGRESS NOTES
Chinyere Olvera 76 y.o.  [facility]: Follow-up, Diabetes (type 2), and Diabetic Eye Exam (2 weeks ago going back in 2 weeks)      [place]: Follow-up, Diabetes (type 2), and Diabetic Eye Exam (2 weeks ago going back in 2 weeks)    Also on lipitor 10 mg daily   Is on norvasc, losartan- bp is higher  Having dental/ facial pain- thought maybe sinus (getting CT)  Feels well overall  Blood sugar and 90 day average sugar reviewed  Results for orders placed or performed in visit on 04/27/21   POC Glucose Fingerstick    Specimen: Blood   Result Value Ref Range    Glucose 98 70 - 130 mg/dL   POC Glycosylated Hemoglobin (Hb A1C)    Specimen: Blood   Result Value Ref Range    Hemoglobin A1C 7.0 %    Lot Number [id number]     Expiration Date 01/14/23      Average sugar is 150   Downloaded kike sensor and reviewed   Overall blood sugar is stable when fasting  Some higher sugars with meals   Discussed mixed meals and premeal dosing with novolog  12 days of data reviewed and discussed  We also discussed lower sugar today   Goal no sugar below 80 discussed, lashell with driving   She does keep juice and snacks in car in case of low sugar    Allergies   Allergen Reactions   • Polymyxin B-Trimethoprim Other (See Comments)   • Penicillins Rash       Current Outpatient Medications:   •  ACCU-CHEK GUIDE test strip, Test blood sugars QID, Disp: 400 each, Rfl: 3  •  amLODIPine (NORVASC) 5 MG tablet, Take 1 tablet by mouth Daily., Disp: 90 tablet, Rfl: 1  •  aspirin 81 MG EC tablet, Take 81 mg by mouth Daily., Disp: , Rfl:   •  atorvastatin (LIPITOR) 10 MG tablet, Take 1 tablet by mouth Daily., Disp: 90 tablet, Rfl: 1  •  Blood Glucose Monitoring Suppl (ACCU-CHEK GUIDE) w/Device kit, 1 Device Daily., Disp: 1 kit, Rfl: 0  •  buPROPion XL (WELLBUTRIN XL) 150 MG 24 hr tablet, , Disp: , Rfl:   •  Calcium Citrate-Vitamin D 250-200 MG-UNIT tablet, , Disp: , Rfl:   •  Continuous Blood Gluc Sensor (TytoStyle Kike 14 Day Sensor) misc, Apply 1 each topically  "Every 14 (Fourteen) Days., Disp: 6 each, Rfl: 3  •  Ferrous Fumarate (IRON) 18 MG tablet controlled-release, Take  by mouth daily., Disp: , Rfl:   •  glucose blood (ONE TOUCH ULTRA TEST) test strip, Test blood sugars QID, Disp: 400 each, Rfl: 3  •  insulin aspart (NovoLOG FlexPen) 100 UNIT/ML solution pen-injector sc pen, Inject 10 Units under the skin into the appropriate area as directed 3 (Three) Times a Day With Meals., Disp: 30 mL, Rfl: 3  •  Insulin Glargine (Lantus SoloStar) 100 UNIT/ML injection pen, INJECT 30 UNITS UNDER THE SKIN ONCE NIGHTLY, Disp: 15 mL, Rfl: 5  •  Insulin Pen Needle (B-D ULTRAFINE III SHORT PEN) 31G X 8 MM misc, USE FOUR TIMES DAILY TO INJECT INSULIN, Disp: 400 each, Rfl: 3  •  Insulin Syringe-Needle U-100 (BD INSULIN SYRINGE ULTRAFINE) 31G X 5/16\" 0.5 ML misc, , Disp: , Rfl:   •  lansoprazole (PREVACID) 30 MG capsule, Take  by mouth., Disp: , Rfl:   •  losartan-hydrochlorothiazide (HYZAAR) 100-12.5 MG per tablet, Take 1 tablet by mouth Daily., Disp: 90 tablet, Rfl: 1  •  metoprolol tartrate (LOPRESSOR) 25 MG tablet, Take 0.5 tablets by mouth 2 (Two) Times a Day., Disp: 90 tablet, Rfl: 1  •  Multiple Vitamins-Minerals (MULTIVITAMIN ADULTS 50+) tablet, , Disp: , Rfl:   •  Omega-3 Fatty Acids (FISH OIL) 1000 MG capsule capsule, Take  by mouth daily., Disp: , Rfl:   •  potassium chloride (K-DUR,KLOR-CON) 10 MEQ CR tablet, Take 1 tablet by mouth 2 (Two) Times a Day With Meals., Disp: 90 tablet, Rfl: 0  •  PRODIGY TWIST TOP LANCETS 28G misc, Use 4 per day to test blood sugars, Disp: 400 each, Rfl: 3  •  zinc gluconate 50 MG tablet, zinc gluconate 50 mg tablet  Daily, Disp: , Rfl:   Patient Active Problem List    Diagnosis    • Abdominal bloating [R14.0]    • Preoperative evaluation to rule out surgical contraindication [Z01.818]    • Obstructive sleep apnea syndrome [G47.33]    • Peripheral neuropathy, idiopathic [G60.9]    • Stress fracture of foot [M84.376A]    • Foot pain [M79.973]    • " "Pain in left foot [M79.672]    • Abnormal liver function tests [R94.5]    • Anemia [D64.9]    • Benign essential hypertension [I10]    • 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 Negative for fatigue and unexpected weight change.   HENT: Negative for congestion and rhinorrhea.    Eyes: Positive for visual disturbance (some vision changes- no floaters ).   Respiratory: Negative for cough and shortness of breath.    Cardiovascular: Negative for chest pain and palpitations.   Gastrointestinal: Negative for nausea and vomiting.   Musculoskeletal: Positive for arthralgias. Negative for gait problem and neck stiffness.   Skin: Negative for color change and wound.   Neurological: Negative for dizziness and light-headedness.   Psychiatric/Behavioral: The patient is not nervous/anxious.      Social History     Socioeconomic History   • Marital status:      Spouse name: Not on file   • Number of children: Not on file   • Years of education: Not on file   • Highest education level: Not on file   Tobacco Use   • Smoking status: Former Smoker   • Smokeless tobacco: Never Used   Vaping Use   • Vaping Use: Never used   Substance and Sexual Activity   • Alcohol use: Yes     Alcohol/week: 7.0 standard drinks     Types: 7 Glasses of wine per week   • Drug use: No   • Sexual activity: Defer     Family History   Problem Relation Age of Onset   • Diabetes Father    • Diabetes Maternal Grandfather    • Coronary artery disease Other      /80   Temp 97.7 °F (36.5 °C) (Infrared)   Ht 162.6 cm (64\")   Wt 83.4 kg (183 lb 12.8 oz) "   BMI 31.55 kg/m²   Physical Exam  Constitutional:       Appearance: Normal appearance.   HENT:      Head: Normocephalic and atraumatic.   Eyes:      Extraocular Movements: Extraocular movements intact.      Pupils: Pupils are equal, round, and reactive to light.   Cardiovascular:      Rate and Rhythm: Normal rate and regular rhythm.      Heart sounds: No murmur heard.     Pulmonary:      Effort: Pulmonary effort is normal. No respiratory distress.      Breath sounds: Normal breath sounds.   Musculoskeletal:         General: No swelling or tenderness. Normal range of motion.      Cervical back: Normal range of motion and neck supple.   Skin:     General: Skin is warm and dry.   Neurological:      General: No focal deficit present.      Mental Status: She is alert and oriented to person, place, and time.   Psychiatric:         Mood and Affect: Mood normal.         Behavior: Behavior normal.       Results for orders placed or performed in visit on 04/27/21   POC Glucose Fingerstick    Specimen: Blood   Result Value Ref Range    Glucose 98 70 - 130 mg/dL   POC Glycosylated Hemoglobin (Hb A1C)    Specimen: Blood   Result Value Ref Range    Hemoglobin A1C 7.0 %    Lot Number [id number]     Expiration Date 01/14/23      Problems Addressed this Visit        Other    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 (CMS/[facility])     Blood sugar and 90 day average sugar reviewed  Results for orders placed or performed in visit on 04/27/21   POC Glucose Fingerstick    Specimen: Blood   Result Value Ref Range    Glucose 98 70 - 130 mg/dL   POC Glycosylated Hemoglobin (Hb A1C)    Specimen: Blood   Result Value Ref Range    Hemoglobin A1C 7.0 %    Lot Number [id number]     Expiration Date 01/14/23      Average sugar is stable   Downloaded royce sensor and reviewed data- higher after meal sugars   Discussed mixed meals, pre meal dosing with novolog  Has f/u with eye doctor due to problems with near vision  Robert right,  callus on toe  F/u 3-4 months          Relevant Medications    Insulin Glargine (Lantus SoloStar) 100 UNIT/ML injection pen    insulin aspart (NovoLOG FlexPen) 100 UNIT/ML solution pen-injector sc pen    Hyperlipidemia     Is on low fat diet and lipitor 10 mg daily   Check flp          Relevant Medications    atorvastatin (LIPITOR) 10 MG tablet    Benign essential hypertension     Continue monitoring and current medication   Call if bp over 145/85 at home          Relevant Medications    amLODIPine (NORVASC) 5 MG tablet    metoprolol tartrate (LOPRESSOR) 25 MG tablet      Other Visit Diagnoses     Uncontrolled type 2 diabetes mellitus with hyperglycemia (CMS/HCC)    -  Primary    Relevant Medications    Insulin Glargine (Lantus SoloStar) 100 UNIT/ML injection pen    insulin aspart (NovoLOG FlexPen) 100 UNIT/ML solution pen-injector sc pen    Other Relevant Orders    POC Glucose Fingerstick (Completed)    POC Glycosylated Hemoglobin (Hb A1C) (Completed)    Comprehensive Metabolic Panel    Lipid Panel    TSH      Diagnoses       Codes Comments    Uncontrolled type 2 diabetes mellitus with hyperglycemia (CMS/HCC)    -  Primary ICD-10-CM: E11.65  ICD-9-CM: 250.02     Benign essential hypertension     ICD-10-CM: I10  ICD-9-CM: 401.1     Pure hypercholesterolemia     ICD-10-CM: E78.00  ICD-9-CM: 272.0     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 (CMS/HCC)     ICD-10-CM: E10.42  ICD-9-CM: 250.61, 357.2         Return in about 3 months (around 7/27/2021) for Recheck.    Maria Eugenia Lawrence MD  Signed Maria Eugenia Lawrence MD

## 2021-04-27 NOTE — ASSESSMENT & PLAN NOTE
Blood sugar and 90 day average sugar reviewed  Results for orders placed or performed in visit on 04/27/21   POC Glucose Fingerstick    Specimen: Blood   Result Value Ref Range    Glucose 98 70 - 130 mg/dL   POC Glycosylated Hemoglobin (Hb A1C)    Specimen: Blood   Result Value Ref Range    Hemoglobin A1C 7.0 %    Lot Number [id number]     Expiration Date 01/14/23      Average sugar is stable   Downloaded royce sensor and reviewed data- higher after meal sugars   Discussed mixed meals, pre meal dosing with novolog  Has f/u with eye doctor due to problems with near vision  [place]amm[place]oe right, callus on toe  F/u 3-4 months

## 2021-04-28 LAB
ALBUMIN SERPL-MCNC: 4.9 G/DL (ref 3.5–5.2)
ALBUMIN/GLOB SERPL: 2.5 G/DL
ALP SERPL-CCNC: 78 U/L (ref 39–117)
ALT SERPL-CCNC: 29 U/L (ref 1–33)
AST SERPL-CCNC: 26 U/L (ref 1–32)
BILIRUB SERPL-MCNC: 0.7 MG/DL (ref 0–1.2)
BUN SERPL-MCNC: 18 MG/DL (ref 8–23)
BUN/CREAT SERPL: 15.8 (ref 7–25)
CALCIUM SERPL-MCNC: 10.1 MG/DL (ref 8.6–10.5)
CHLORIDE SERPL-SCNC: 105 MMOL/L (ref 98–107)
CHOLEST SERPL-MCNC: 157 MG/DL (ref 0–200)
CO2 SERPL-SCNC: 29.2 MMOL/L (ref 22–29)
CREAT SERPL-MCNC: 1.14 MG/DL (ref 0.57–1)
GLOBULIN SER CALC-MCNC: 2 GM/DL
GLUCOSE SERPL-MCNC: 80 MG/DL (ref 65–99)
HDLC SERPL-MCNC: 54 MG/DL (ref 40–60)
LDLC SERPL CALC-MCNC: 83 MG/DL (ref 0–100)
POTASSIUM SERPL-SCNC: 4 MMOL/L (ref 3.5–5.2)
PROT SERPL-MCNC: 6.9 G/DL (ref 6–8.5)
SODIUM SERPL-SCNC: 143 MMOL/L (ref 136–145)
TRIGL SERPL-MCNC: 109 MG/DL (ref 0–150)
TSH SERPL DL<=0.005 MIU/L-ACNC: 1.89 UIU/ML (ref 0.27–4.2)
VLDLC SERPL CALC-MCNC: 20 MG/DL (ref 5–40)

## 2021-05-18 RX ORDER — POTASSIUM CHLORIDE 750 MG/1
10 TABLET, EXTENDED RELEASE ORAL 2 TIMES DAILY WITH MEALS
Qty: 90 TABLET | Refills: 1 | Status: SHIPPED | OUTPATIENT
Start: 2021-05-18 | End: 2021-10-28 | Stop reason: SDUPTHER

## 2021-05-18 NOTE — TELEPHONE ENCOUNTER
PT CALLED REQUESTING A REFILL OF POTASSIUM TO BE SENT IN TO [facility]. PLEASE AND THANK YOU

## 2021-06-18 RX ORDER — INSULIN GLARGINE 100 [IU]/ML
INJECTION, SOLUTION SUBCUTANEOUS
Qty: 15 ML | Refills: 5 | Status: SHIPPED | OUTPATIENT
Start: 2021-06-18 | End: 2022-03-18

## 2021-07-28 ENCOUNTER — OFFICE VISIT (OUTPATIENT)
Dept: ENDOCRINOLOGY | Facility: CLINIC | Age: 76
End: 2021-07-28

## 2021-07-28 VITALS
SYSTOLIC BLOOD PRESSURE: 142 MMHG | HEIGHT: 64 IN | DIASTOLIC BLOOD PRESSURE: 80 MMHG | BODY MASS INDEX: 31.07 KG/M2 | HEART RATE: 73 BPM | WEIGHT: 182 LBS | OXYGEN SATURATION: 98 %

## 2021-07-28 DIAGNOSIS — E78.00 PURE HYPERCHOLESTEROLEMIA: ICD-10-CM

## 2021-07-28 DIAGNOSIS — I10 BENIGN ESSENTIAL HYPERTENSION: ICD-10-CM

## 2021-07-28 DIAGNOSIS — E11.65 UNCONTROLLED TYPE 2 DIABETES MELLITUS WITH HYPERGLYCEMIA (HCC): Primary | ICD-10-CM

## 2021-07-28 DIAGNOSIS — E10.42 TYPE 1 DIABETES MELLITUS WITH DIABETIC POLYNEUROPATHY (HCC): ICD-10-CM

## 2021-07-28 LAB
EXPIRATION DATE: NORMAL
GLUCOSE BLDC GLUCOMTR-MCNC: 130 MG/DL (ref 70–130)
HBA1C MFR BLD: 6.9 %
Lab: NORMAL

## 2021-07-28 PROCEDURE — 99214 OFFICE O/P EST MOD 30 MIN: CPT | Performed by: INTERNAL MEDICINE

## 2021-07-28 PROCEDURE — 83036 HEMOGLOBIN GLYCOSYLATED A1C: CPT | Performed by: INTERNAL MEDICINE

## 2021-07-28 PROCEDURE — 95251 CONT GLUC MNTR ANALYSIS I&R: CPT | Performed by: INTERNAL MEDICINE

## 2021-07-28 PROCEDURE — 3044F HG A1C LEVEL LT 7.0%: CPT | Performed by: INTERNAL MEDICINE

## 2021-07-28 NOTE — PROGRESS NOTES
Chinyere Olvera 76 y.o.  CC: Diabetes (follow up)      [place]: Diabetes (follow up)    bp is good on lopressor and hyzaar   Takes potassium supplement as well  Is on lipitor 10 mg daily and low fat diet  Uses basal bolus insulin   Level of control reviewed  Results for orders placed or performed in visit on 07/28/21   POC Glucose, Blood    Specimen: Blood   Result Value Ref Range    Glucose 130 70 - 130 mg/dL    Lot Number [id number]     Expiration Date 01/29/2022    POC Glycosylated Hemoglobin (Hb A1C)    Specimen: Blood   Result Value Ref Range    Hemoglobin A1C 6.9 %     Average sugar is 150   This is stable from prior visit  Is utd with eye exam  No neuropathy or callus  Ur alb neg   Higher sugar after breakfast and dinner  Low sugar - not detecting well   Nosebleeds- nose drying out with cpap   Low sugar lashell after meals and if correction at night, or with increased activity  Downloaded sensor and reviewed 12 days of data  58% of sugars are at goal  Higher sugar pp dinner  Lowest sugar/hypoglycemia overnight     Allergies   Allergen Reactions   • Polymyxin B-Trimethoprim Other (See Comments)   • Penicillins Rash       Current Outpatient Medications:   •  ACCU-CHEK GUIDE test strip, Test blood sugars QID, Disp: 400 each, Rfl: 3  •  amLODIPine (NORVASC) 5 MG tablet, Take 1 tablet by mouth Daily., Disp: 90 tablet, Rfl: 1  •  aspirin 81 MG EC tablet, Take 81 mg by mouth Daily., Disp: , Rfl:   •  atorvastatin (LIPITOR) 10 MG tablet, Take 1 tablet by mouth Daily., Disp: 90 tablet, Rfl: 1  •  Blood Glucose Monitoring Suppl (ACCU-CHEK GUIDE) w/Device kit, 1 Device Daily., Disp: 1 kit, Rfl: 0  •  buPROPion XL (WELLBUTRIN XL) 150 MG 24 hr tablet, , Disp: , Rfl:   •  Calcium Citrate-Vitamin D 250-200 MG-UNIT tablet, , Disp: , Rfl:   •  Continuous Blood Gluc Sensor (FreeStyle Kike 14 Day Sensor) misc, Apply 1 each topically Every 14 (Fourteen) Days., Disp: 6 each, Rfl: 3  •  Ferrous Fumarate (IRON) 18 MG tablet  "controlled-release, Take  by mouth daily., Disp: , Rfl:   •  glucose blood (ONE TOUCH ULTRA TEST) test strip, Test blood sugars QID, Disp: 400 each, Rfl: 3  •  insulin aspart (NovoLOG FlexPen) 100 UNIT/ML solution pen-injector sc pen, Inject 10 Units under the skin into the appropriate area as directed 3 (Three) Times a Day With Meals., Disp: 30 mL, Rfl: 3  •  Insulin Glargine (Lantus SoloStar) 100 UNIT/ML injection pen, INJECT 30 UNITS UNDER THE SKIN ONCE NIGHTLY, Disp: 15 mL, Rfl: 5  •  Insulin Pen Needle (B-D ULTRAFINE III SHORT PEN) 31G X 8 MM misc, USE FOUR TIMES DAILY TO INJECT INSULIN, Disp: 400 each, Rfl: 3  •  Insulin Syringe-Needle U-100 (BD INSULIN SYRINGE ULTRAFINE) 31G X 5/16\" 0.5 ML misc, , Disp: , Rfl:   •  lansoprazole (PREVACID) 30 MG capsule, Take  by mouth., Disp: , Rfl:   •  losartan-hydrochlorothiazide (HYZAAR) 100-12.5 MG per tablet, Take 1 tablet by mouth Daily., Disp: 90 tablet, Rfl: 1  •  metoprolol tartrate (LOPRESSOR) 25 MG tablet, Take 0.5 tablets by mouth 2 (Two) Times a Day., Disp: 90 tablet, Rfl: 1  •  Multiple Vitamins-Minerals (MULTIVITAMIN ADULTS 50+) tablet, , Disp: , Rfl:   •  Omega-3 Fatty Acids (FISH OIL) 1000 MG capsule capsule, Take  by mouth daily., Disp: , Rfl:   •  potassium chloride (K-DUR,KLOR-CON) 10 MEQ CR tablet, Take 1 tablet by mouth 2 (Two) Times a Day With Meals., Disp: 90 tablet, Rfl: 1  •  PRODIGY TWIST TOP LANCETS 28G misc, Use 4 per day to test blood sugars, Disp: 400 each, Rfl: 3  •  zinc gluconate 50 MG tablet, zinc gluconate 50 mg tablet  Daily, Disp: , Rfl:   Patient Active Problem List    Diagnosis    • Abdominal bloating [R14.0]    • Preoperative evaluation to rule out surgical contraindication [Z01.818]    • Obstructive sleep apnea syndrome [G47.33]    • Peripheral neuropathy, idiopathic [G60.9]    • Stress fracture of foot [M84.376A]    • Foot pain [M79.673]    • Pain in left foot [M79.672]    • Abnormal liver function tests [R94.5]    • Anemia [D64.9]  "   • Benign essential hypertension [I10]    • 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) [E10.49]    • Gastroesophageal reflux disease [K21.9]    • Hyperlipidemia [E78.5]    • Hypokalemia [E87.6]    • Calculus of kidney [N20.0]    • Cyst of ovary [N83.209]    • Renal insufficiency [N28.9]    • Solitary pulmonary nodule [R91.1]    • Vitamin D deficiency [E55.9]    • Abnormal weight loss [R63.4]      Review of Systems   Constitutional: Negative for activity change, appetite change and unexpected weight change.   HENT: Positive for nosebleeds. Negative for congestion and rhinorrhea.    Eyes: Positive for visual disturbance (has f/u with eye md - considering injections for ? reason).   Respiratory: Negative for cough and shortness of breath.    Cardiovascular: Negative for palpitations and leg swelling.   Gastrointestinal: Negative for constipation, diarrhea and nausea.   Genitourinary: Negative for hematuria.   Musculoskeletal: Negative for arthralgias, back pain, gait problem, joint swelling and myalgias.   Skin: Negative for color change, rash and wound.   Allergic/Immunologic: Negative for environmental allergies, food allergies and immunocompromised state.   Neurological: Negative for dizziness, weakness and light-headedness.   Psychiatric/Behavioral: Negative for confusion, decreased concentration, dysphoric mood and sleep disturbance. The patient is not nervous/anxious.      Social History     Socioeconomic History   • Marital status:      Spouse name: Not on file   • Number of children: Not on file   • Years of education: Not on file   • Highest education level: Not on file   Tobacco Use   • Smoking status: Former Smoker   • Smokeless tobacco: Never Used   Vaping Use   • Vaping Use: Never used   Substance and Sexual Activity   • Alcohol use: Yes     Alcohol/week: 7.0 standard drinks     Types: 7 Glasses of wine per week   • Drug use: No   • Sexual activity: Defer     Family History  "  Problem Relation Age of Onset   • Diabetes Father    • Diabetes Maternal Grandfather    • Coronary artery disease Other      /80   Pulse 73   Ht 162.6 cm (64\")   Wt 82.6 kg (182 lb)   SpO2 98%   BMI 31.24 kg/m²   Physical Exam  Vitals and nursing note reviewed.   Constitutional:       Appearance: Normal appearance. She is well-developed.   HENT:      Head: Normocephalic and atraumatic.   Eyes:      General: Lids are normal.      Extraocular Movements: Extraocular movements intact.      Conjunctiva/sclera: Conjunctivae normal.      Pupils: Pupils are equal, round, and reactive to light.   Neck:      Thyroid: No thyroid mass or thyromegaly.      Vascular: No carotid bruit.      Trachea: Trachea normal. No tracheal deviation.   Cardiovascular:      Rate and Rhythm: Normal rate and regular rhythm.      Heart sounds: Normal heart sounds. No murmur heard.   No friction rub. No gallop.    Pulmonary:      Effort: Pulmonary effort is normal. No respiratory distress.      Breath sounds: Normal breath sounds. No wheezing.   Musculoskeletal:         General: No deformity. Normal range of motion.      Cervical back: Normal range of motion and neck supple.        Feet:    Feet:      Comments: Diabetic foot exam:   Left: Filament test present   Pulses Dorsalis Pedis:  present   Reflexes absent    Vibratory sensation normal   Proprioception normal   Sharp/dull discrimination normal       Right: Filament test present   Pulses Dorsalis Pedis:  present   Reflexes absent    Vibratory sensation normal   Proprioception normal   Sharp/dull discrimination normal  Diabetic Foot Exam Performed and Monofilament Test Performed    Lymphadenopathy:      Cervical: No cervical adenopathy.   Skin:     General: Skin is warm and dry.      Findings: No erythema or rash.      Nails: There is no clubbing.   Neurological:      Mental Status: She is alert and oriented to person, place, and time.      Cranial Nerves: No cranial nerve deficit. "      Deep Tendon Reflexes: Reflexes are normal and symmetric. Reflexes normal.   Psychiatric:         Speech: Speech normal.         Behavior: Behavior normal.         Thought Content: Thought content normal.         Judgment: Judgment normal.       Results for orders placed or performed in visit on 07/28/21   POC Glucose, Blood    Specimen: Blood   Result Value Ref Range    Glucose 130 70 - 130 mg/dL    Lot Number [id number]     Expiration Date 01/29/2022    POC Glycosylated Hemoglobin (Hb A1C)    Specimen: Blood   Result Value Ref Range    Hemoglobin A1C 6.9 %     Diagnoses and all orders for this visit:    1. Uncontrolled type 2 diabetes mellitus with hyperglycemia (CMS/[facility]) (Primary)  -     POC Glucose, Blood  -     POC Glycosylated Hemoglobin (Hb A1C)    2. 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 (CMS/[facility])  Assessment & Plan:  Blood sugar and 90 day average sugar reviewed  Results for orders placed or performed in visit on 07/28/21   POC Glucose, Blood    Specimen: Blood   Result Value Ref Range    Glucose 130 70 - 130 mg/dL    Lot Number [id number]     Expiration Date 01/29/2022    POC Glycosylated Hemoglobin (Hb A1C)    Specimen: Blood   Result Value Ref Range    Hemoglobin A1C 6.9 %     Average sugar is good but she is having significant hypoglycemia with unawaredness   Discussed change to alerting sensor but she just got new batch for plain royce  She does scan often  Discussed reducing pre meal insulin by 2 u, juice/snacks with activity   Avoid covering hs high sugar to avoid night low  Sensor data reviewed and strategy to tackle low sugar discussed  Goal no sugar below 80  F/u 3-4 months  Possible retinopathy- considering injections      3. Pure hypercholesterolemia  Assessment & Plan:  On statin   Last ldl 83  Continue and monitor       4. Benign essential hypertension  Assessment & Plan:  bp is stable on current dose losartan   Continue with monitoring     Return in about 3 months (around  10/28/2021) for Recheck.    Maria Eugenia Lawrence MD  Signed Maria Eugenia Lawrence MD

## 2021-07-28 NOTE — ASSESSMENT & PLAN NOTE
Blood sugar and 90 day average sugar reviewed  Results for orders placed or performed in visit on 07/28/21   POC Glucose, Blood    Specimen: Blood   Result Value Ref Range    Glucose 130 70 - 130 mg/dL    Lot Number [id number]     Expiration Date 01/29/2022    POC Glycosylated Hemoglobin (Hb A1C)    Specimen: Blood   Result Value Ref Range    Hemoglobin A1C 6.9 %     Average sugar is good but she is having significant hypoglycemia with unawaredness   Discussed change to alerting sensor but she just got new batch for plain royce  She does scan often  Discussed reducing pre meal insulin by 2 u, juice/snacks with activity   Avoid covering hs high sugar to avoid night low  Sensor data reviewed and strategy to tackle low sugar discussed  Goal no sugar below 80  F/u 3-4 months  Possible retinopathy- considering injections

## 2021-08-31 RX ORDER — INSULIN ASPART 100 [IU]/ML
10 INJECTION, SOLUTION INTRAVENOUS; SUBCUTANEOUS
Qty: 30 ML | Refills: 1 | Status: SHIPPED | OUTPATIENT
Start: 2021-08-31 | End: 2022-02-13

## 2021-08-31 NOTE — TELEPHONE ENCOUNTER
Patient called requesting a refill on insulin aspart (NovoLOG FlexPen) 100 UNIT/ML solution pen-injector sc pen. She asked this be sent to [facility]. Please advise.

## 2021-10-06 ENCOUNTER — TELEPHONE (OUTPATIENT)
Dept: ENDOCRINOLOGY | Facility: CLINIC | Age: 76
End: 2021-10-06

## 2021-10-06 NOTE — TELEPHONE ENCOUNTER
Pt is calling to say that she is needing to order her Juli one and had discussed with Dr. Lawrence about trying the juli 2. She was wondering if there were any samples of the juli 2 she could come  to try before she orders it.

## 2021-10-06 NOTE — TELEPHONE ENCOUNTER
Yes- I think they are keeping all royce 2 samples in the education room  I can put one up tonight for her to  tomorrow if ok  Thanks, teresita

## 2021-10-28 NOTE — TELEPHONE ENCOUNTER
Pt called requesting a refill on Potassium chloride 10 meq cr also pt needs a refill on Metoprolpl 25 mg. Pt asked for 90 day supply on both prescriptions. Pt last seen 07/28/21 pt next appt 11/02/21

## 2021-10-29 RX ORDER — POTASSIUM CHLORIDE 750 MG/1
10 TABLET, EXTENDED RELEASE ORAL 2 TIMES DAILY WITH MEALS
Qty: 90 TABLET | Refills: 1 | Status: SHIPPED | OUTPATIENT
Start: 2021-10-29 | End: 2022-05-24 | Stop reason: SDUPTHER

## 2021-11-02 ENCOUNTER — LAB (OUTPATIENT)
Dept: LAB | Facility: HOSPITAL | Age: 76
End: 2021-11-02

## 2021-11-02 ENCOUNTER — OFFICE VISIT (OUTPATIENT)
Dept: ENDOCRINOLOGY | Facility: CLINIC | Age: 76
End: 2021-11-02

## 2021-11-02 VITALS
WEIGHT: 182.2 LBS | SYSTOLIC BLOOD PRESSURE: 112 MMHG | BODY MASS INDEX: 30.35 KG/M2 | HEIGHT: 65 IN | DIASTOLIC BLOOD PRESSURE: 60 MMHG

## 2021-11-02 DIAGNOSIS — E78.00 PURE HYPERCHOLESTEROLEMIA: ICD-10-CM

## 2021-11-02 DIAGNOSIS — E10.42 TYPE 1 DIABETES MELLITUS WITH DIABETIC POLYNEUROPATHY (HCC): Primary | ICD-10-CM

## 2021-11-02 DIAGNOSIS — R79.89 ABNORMAL LIVER FUNCTION TESTS: ICD-10-CM

## 2021-11-02 LAB
EXPIRATION DATE: NORMAL
EXPIRATION DATE: NORMAL
GLUCOSE BLDC GLUCOMTR-MCNC: 114 MG/DL (ref 70–130)
HBA1C MFR BLD: 6.9 %
Lab: NORMAL
Lab: NORMAL

## 2021-11-02 PROCEDURE — 99214 OFFICE O/P EST MOD 30 MIN: CPT | Performed by: INTERNAL MEDICINE

## 2021-11-02 PROCEDURE — 3044F HG A1C LEVEL LT 7.0%: CPT | Performed by: INTERNAL MEDICINE

## 2021-11-02 PROCEDURE — 95251 CONT GLUC MNTR ANALYSIS I&R: CPT | Performed by: INTERNAL MEDICINE

## 2021-11-02 PROCEDURE — 83036 HEMOGLOBIN GLYCOSYLATED A1C: CPT | Performed by: INTERNAL MEDICINE

## 2021-11-02 NOTE — ASSESSMENT & PLAN NOTE
Blood sugar and 90 day average sugar reviewed  Results for orders placed or performed in visit on 11/02/21   POC Glycosylated Hemoglobin (Hb A1C)    Specimen: Blood   Result Value Ref Range    Hemoglobin A1C 6.9 %    Lot Number [id number]     Expiration Date 05/19/2023    POC Glucose, Blood    Specimen: Blood   Result Value Ref Range    Glucose 114 70 - 130 mg/dL    Lot Number [id number]     Expiration Date 04/07/2022      Average sugar is 150   Taking lantus and novolog   Using sensor with higher pp sugars especially after dinner  Discussed taking novolog prior to meals  Reduction in dose on sundays when she goes to [employer] and tends to have a low sugar  Time pm insulin prior to dinner and give sufficient for carb amount  F/u 3 months

## 2021-11-02 NOTE — PROGRESS NOTES
Chinyere Olvera 76 y.o.  CC:Follow-up, Diabetes (Type II, eye exam 9-16-21), Hyperlipidemia, Hypertension, and Hypothyroidism      [place]: Follow-up, Diabetes (Type II, eye exam 9-16-21), Hyperlipidemia, Hypertension, and Hypothyroidism    Blood sugar and 90 day average sugar reviewed  Results for orders placed or performed in visit on 11/02/21   POC Glycosylated Hemoglobin (Hb A1C)    Specimen: Blood   Result Value Ref Range    Hemoglobin A1C 6.9 %    Lot Number [id number]     Expiration Date 05/19/2023    POC Glucose, Blood    Specimen: Blood   Result Value Ref Range    Glucose 114 70 - 130 mg/dL    Lot Number [id number]     Expiration Date 04/07/2022      Average sugar is good  Having some low sugars at [employer] - discussed taking 1/2 dose of breakfast insulin on Sundays (tends to be more active these days)  Is taking lipitor 10 mg daily and eating low fat diet   bp is well controlled on current medication   Downloaded sensor and reviewed 10 days of sensor data    Allergies   Allergen Reactions   • Polymyxin B Other (See Comments)   • Polymyxin B-Trimethoprim Other (See Comments)   • Penicillins Rash       Current Outpatient Medications:   •  ACCU-CHEK GUIDE test strip, Test blood sugars QID, Disp: 400 each, Rfl: 3  •  amLODIPine (NORVASC) 5 MG tablet, Take 1 tablet by mouth Daily., Disp: 90 tablet, Rfl: 1  •  aspirin 81 MG EC tablet, Take 81 mg by mouth Daily., Disp: , Rfl:   •  atorvastatin (LIPITOR) 10 MG tablet, Take 1 tablet by mouth Daily., Disp: 90 tablet, Rfl: 1  •  Blood Glucose Monitoring Suppl (ACCU-CHEK GUIDE) w/Device kit, 1 Device Daily., Disp: 1 kit, Rfl: 0  •  buPROPion XL (WELLBUTRIN XL) 150 MG 24 hr tablet, , Disp: , Rfl:   •  Calcium Citrate-Vitamin D 250-200 MG-UNIT tablet, , Disp: , Rfl:   •  Continuous Blood Gluc Sensor (FreeStyle Kike 14 Day Sensor) misc, Apply 1 each topically Every 14 (Fourteen) Days., Disp: 6 each, Rfl: 3  •  Ferrous Fumarate (IRON) 18 MG tablet controlled-release, Take  by  "mouth daily., Disp: , Rfl:   •  glucose blood (ONE TOUCH ULTRA TEST) test strip, Test blood sugars QID, Disp: 400 each, Rfl: 3  •  insulin aspart (NovoLOG FlexPen) 100 UNIT/ML solution pen-injector sc pen, Inject 10 Units under the skin into the appropriate area as directed 3 (Three) Times a Day With Meals., Disp: 30 mL, Rfl: 1  •  Insulin Glargine (Lantus SoloStar) 100 UNIT/ML injection pen, INJECT 30 UNITS UNDER THE SKIN ONCE NIGHTLY, Disp: 15 mL, Rfl: 5  •  Insulin Pen Needle (B-D ULTRAFINE III SHORT PEN) 31G X 8 MM misc, USE FOUR TIMES DAILY TO INJECT INSULIN, Disp: 400 each, Rfl: 3  •  Insulin Syringe-Needle U-100 (BD INSULIN SYRINGE ULTRAFINE) 31G X 5/16\" 0.5 ML misc, , Disp: , Rfl:   •  lansoprazole (PREVACID) 30 MG capsule, Take  by mouth., Disp: , Rfl:   •  losartan-hydrochlorothiazide (HYZAAR) 100-12.5 MG per tablet, Take 1 tablet by mouth Daily., Disp: 90 tablet, Rfl: 1  •  metoprolol tartrate (LOPRESSOR) 25 MG tablet, Take 0.5 tablets by mouth 2 (Two) Times a Day., Disp: 90 tablet, Rfl: 1  •  Multiple Vitamins-Minerals (MULTIVITAMIN ADULTS 50+) tablet, , Disp: , Rfl:   •  Omega-3 Fatty Acids (FISH OIL) 1000 MG capsule capsule, Take  by mouth daily., Disp: , Rfl:   •  potassium chloride (K-DUR,KLOR-CON) 10 MEQ CR tablet, Take 1 tablet by mouth 2 (Two) Times a Day With Meals., Disp: 90 tablet, Rfl: 1  •  PRODIGY TWIST TOP LANCETS 28G misc, Use 4 per day to test blood sugars, Disp: 400 each, Rfl: 3  •  zinc gluconate 50 MG tablet, zinc gluconate 50 mg tablet  Daily, Disp: , Rfl:   Patient Active Problem List    Diagnosis    • Abdominal bloating [R14.0]    • Preoperative evaluation to rule out surgical contraindication [Z01.818]    • Obstructive sleep apnea syndrome [G47.33]    • Peripheral neuropathy, idiopathic [G60.9]    • Stress fracture of foot [M84.376A]    • Foot pain [M79.673]    • Pain in left foot [M79.712]    • Moderate nonproliferative diabetic retinopathy associated with type 2 diabetes mellitus " (HCC) [E11.3399]    • Abnormal liver function tests [R94.5]    • Anemia [D64.9]    • Benign essential hypertension [I10]    • 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 (HCC) [E10.49]    • Gastroesophageal reflux disease [K21.9]    • Hyperlipidemia [E78.5]    • Hypokalemia [E87.6]    • Calculus of kidney [N20.0]    • Cyst of ovary [N83.209]    • Renal insufficiency [N28.9]    • Solitary pulmonary nodule [R91.1]    • Vitamin D deficiency [E55.9]    • Abnormal weight loss [R63.4]      Review of Systems   Constitutional: Negative for activity change, appetite change and unexpected weight change.   HENT: Negative for congestion and rhinorrhea.    Eyes: Negative for visual disturbance.   Respiratory: Negative for cough and shortness of breath.    Cardiovascular: Negative for palpitations and leg swelling.   Gastrointestinal: Negative for constipation, diarrhea and nausea.   Genitourinary: Negative for hematuria.   Musculoskeletal: Negative for arthralgias, back pain, gait problem, joint swelling and myalgias.   Skin: Negative for color change, rash and wound.   Allergic/Immunologic: Negative for environmental allergies, food allergies and immunocompromised state.   Neurological: Negative for dizziness, weakness and light-headedness.   Psychiatric/Behavioral: Negative for confusion, decreased concentration, dysphoric mood and sleep disturbance. The patient is not nervous/anxious.      Social History     Socioeconomic History   • Marital status:    Tobacco Use   • Smoking status: Former Smoker   • Smokeless tobacco: Never Used   Vaping Use   • Vaping Use: Never used   Substance and Sexual Activity   • Alcohol use: Yes     Alcohol/week: 7.0 standard drinks     Types: 7 Glasses of wine per week   • Drug use: No   • Sexual activity: Defer     Family History   Problem Relation Age of Onset   • Diabetes Father    • Diabetes Maternal Grandfather    • Coronary artery disease Other      /60   Ht 165.1  "cm (65\")   Wt 82.6 kg (182 lb 3.2 oz)   BMI 30.32 kg/m²   Physical Exam  Vitals and nursing note reviewed.   Constitutional:       Appearance: Normal appearance. She is well-developed.   HENT:      Head: Normocephalic and atraumatic.   Eyes:      General: Lids are normal.      Extraocular Movements: Extraocular movements intact.      Conjunctiva/sclera: Conjunctivae normal.      Pupils: Pupils are equal, round, and reactive to light.   Neck:      Thyroid: No thyroid mass or thyromegaly.      Vascular: No carotid bruit.      Trachea: Trachea normal. No tracheal deviation.   Cardiovascular:      Rate and Rhythm: Normal rate and regular rhythm.      Pulses: Normal pulses.      Heart sounds: Normal heart sounds. No murmur heard.  No friction rub. No gallop.    Pulmonary:      Effort: Pulmonary effort is normal. No respiratory distress.      Breath sounds: Normal breath sounds. No wheezing.   Musculoskeletal:         General: No deformity. Normal range of motion.      Cervical back: Normal range of motion and neck supple.        Feet:    Feet:      Comments: Diabetic foot exam:   Left: Filament test absent   Pulses Dorsalis Pedis:  diminished   Reflexes absent    Vibratory sensation diminished   Proprioception normal   Sharp/dull discrimination normal       Right: Filament test absent   Pulses Dorsalis Pedis:  diminished   Reflexes absent    Vibratory sensation diminished   Proprioception normal   Sharp/dull discrimination normal  Diabetic Foot Exam Performed and Monofilament Test Performed    Lymphadenopathy:      Cervical: No cervical adenopathy.   Skin:     General: Skin is warm and dry.      Findings: No erythema or rash.      Nails: There is no clubbing.   Neurological:      General: No focal deficit present.      Mental Status: She is alert and oriented to person, place, and time.      Cranial Nerves: No cranial nerve deficit.      Deep Tendon Reflexes: Reflexes are normal and symmetric. Reflexes normal. "   Psychiatric:         Mood and Affect: Mood normal.         Speech: Speech normal.         Behavior: Behavior normal.         Thought Content: Thought content normal.         Judgment: Judgment normal.       Results for orders placed or performed in visit on 11/02/21   POC Glycosylated Hemoglobin (Hb A1C)    Specimen: Blood   Result Value Ref Range    Hemoglobin A1C 6.9 %    Lot Number [id number]     Expiration Date 05/19/2023    POC Glucose, Blood    Specimen: Blood   Result Value Ref Range    Glucose 114 70 - 130 mg/dL    Lot Number [id number]     Expiration Date 04/07/2022      Diagnoses and all orders for this visit:    1. 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 (HCC) (Primary)  Assessment & Plan:  Blood sugar and 90 day average sugar reviewed  Results for orders placed or performed in visit on 11/02/21   POC Glycosylated Hemoglobin (Hb A1C)    Specimen: Blood   Result Value Ref Range    Hemoglobin A1C 6.9 %    Lot Number [id number]     Expiration Date 05/19/2023    POC Glucose, Blood    Specimen: Blood   Result Value Ref Range    Glucose 114 70 - 130 mg/dL    Lot Number [id number]     Expiration Date 04/07/2022      Average sugar is 150   Taking lantus and novolog   Using sensor with higher pp sugars especially after dinner  Discussed taking novolog prior to meals  Reduction in dose on sundays when she goes to [employer] and tends to have a low sugar  Time pm insulin prior to dinner and give sufficient for carb amount  F/u 3 months     Orders:  -     POC Glycosylated Hemoglobin (Hb A1C)  -     POC Glucose, Blood  -     Comprehensive Metabolic Panel  -     Cancel: Microalbumin / Creatinine Urine Ratio - Urine, Clean Catch    2. Abnormal liver function tests  Assessment & Plan:  Prior abnormal lfts- u/s neg 2013  Update cmp with monitoring       3. Pure hypercholesterolemia  Assessment & Plan:  Is eating low fat diet, taking statin lipitor 10 mg daily   Check flp     Orders:  -     Lipid Panel  -      TSH  Return in about 3 months (around 2/2/2022) for Recheck.    Maria Eugenia Lawrecne MD  Signed Maria Eugenia Lawrence MD

## 2021-11-03 LAB
ALBUMIN SERPL-MCNC: 4.8 G/DL (ref 3.5–5.2)
ALBUMIN/GLOB SERPL: 2.4 G/DL
ALP SERPL-CCNC: 92 U/L (ref 39–117)
ALT SERPL-CCNC: 24 U/L (ref 1–33)
AST SERPL-CCNC: 25 U/L (ref 1–32)
BILIRUB SERPL-MCNC: 0.5 MG/DL (ref 0–1.2)
BUN SERPL-MCNC: 19 MG/DL (ref 8–23)
BUN/CREAT SERPL: 16.1 (ref 7–25)
CALCIUM SERPL-MCNC: 9.9 MG/DL (ref 8.6–10.5)
CHLORIDE SERPL-SCNC: 105 MMOL/L (ref 98–107)
CHOLEST SERPL-MCNC: 152 MG/DL (ref 0–200)
CO2 SERPL-SCNC: 29.6 MMOL/L (ref 22–29)
CREAT SERPL-MCNC: 1.18 MG/DL (ref 0.57–1)
GLOBULIN SER CALC-MCNC: 2 GM/DL
GLUCOSE SERPL-MCNC: 73 MG/DL (ref 65–99)
HDLC SERPL-MCNC: 49 MG/DL (ref 40–60)
LDLC SERPL CALC-MCNC: 80 MG/DL (ref 0–100)
POTASSIUM SERPL-SCNC: 4.9 MMOL/L (ref 3.5–5.2)
PROT SERPL-MCNC: 6.8 G/DL (ref 6–8.5)
SODIUM SERPL-SCNC: 147 MMOL/L (ref 136–145)
TRIGL SERPL-MCNC: 130 MG/DL (ref 0–150)
TSH SERPL DL<=0.005 MIU/L-ACNC: 1.89 UIU/ML (ref 0.27–4.2)
VLDLC SERPL CALC-MCNC: 23 MG/DL (ref 5–40)

## 2021-12-10 RX ORDER — FLASH GLUCOSE SENSOR
1 KIT MISCELLANEOUS
Qty: 6 EACH | Refills: 0 | Status: SHIPPED | OUTPATIENT
Start: 2021-12-10 | End: 2022-03-18

## 2022-01-14 RX ORDER — LOSARTAN POTASSIUM AND HYDROCHLOROTHIAZIDE 12.5; 1 MG/1; MG/1
1 TABLET ORAL DAILY
Qty: 90 TABLET | Refills: 1 | Status: SHIPPED | OUTPATIENT
Start: 2022-01-14 | End: 2022-06-13

## 2022-02-13 RX ORDER — INSULIN ASPART 100 [IU]/ML
INJECTION, SOLUTION INTRAVENOUS; SUBCUTANEOUS
Qty: 15 ML | Refills: 1 | Status: SHIPPED | OUTPATIENT
Start: 2022-02-13 | End: 2022-03-18

## 2022-02-22 ENCOUNTER — OFFICE VISIT (OUTPATIENT)
Dept: ENDOCRINOLOGY | Facility: CLINIC | Age: 77
End: 2022-02-22

## 2022-02-22 VITALS
SYSTOLIC BLOOD PRESSURE: 158 MMHG | DIASTOLIC BLOOD PRESSURE: 72 MMHG | HEIGHT: 65 IN | OXYGEN SATURATION: 98 % | BODY MASS INDEX: 30.42 KG/M2 | HEART RATE: 78 BPM | WEIGHT: 182.6 LBS

## 2022-02-22 DIAGNOSIS — E10.42 TYPE 1 DIABETES MELLITUS WITH DIABETIC POLYNEUROPATHY: Primary | ICD-10-CM

## 2022-02-22 DIAGNOSIS — I10 BENIGN ESSENTIAL HYPERTENSION: ICD-10-CM

## 2022-02-22 DIAGNOSIS — E78.00 PURE HYPERCHOLESTEROLEMIA: ICD-10-CM

## 2022-02-22 PROBLEM — F41.8 MIXED ANXIETY AND DEPRESSIVE DISORDER: Status: ACTIVE | Noted: 2022-01-31

## 2022-02-22 LAB
EXPIRATION DATE: ABNORMAL
EXPIRATION DATE: NORMAL
GLUCOSE BLDC GLUCOMTR-MCNC: 138 MG/DL (ref 70–130)
HBA1C MFR BLD: 7.2 %
Lab: ABNORMAL
Lab: NORMAL

## 2022-02-22 PROCEDURE — 95251 CONT GLUC MNTR ANALYSIS I&R: CPT | Performed by: INTERNAL MEDICINE

## 2022-02-22 PROCEDURE — 99214 OFFICE O/P EST MOD 30 MIN: CPT | Performed by: INTERNAL MEDICINE

## 2022-02-22 PROCEDURE — 83036 HEMOGLOBIN GLYCOSYLATED A1C: CPT | Performed by: INTERNAL MEDICINE

## 2022-02-22 PROCEDURE — 3051F HG A1C>EQUAL 7.0%<8.0%: CPT | Performed by: INTERNAL MEDICINE

## 2022-02-22 RX ORDER — ATORVASTATIN CALCIUM 10 MG/1
10 TABLET, FILM COATED ORAL DAILY
Qty: 90 TABLET | Refills: 1 | Status: SHIPPED | OUTPATIENT
Start: 2022-02-22 | End: 2022-06-13

## 2022-02-22 RX ORDER — AMLODIPINE BESYLATE 5 MG/1
5 TABLET ORAL DAILY
Qty: 90 TABLET | Refills: 1 | Status: SHIPPED | OUTPATIENT
Start: 2022-02-22 | End: 2022-06-13

## 2022-02-22 NOTE — PROGRESS NOTES
Chinyere Olvera 76 y.o.  CC:Follow-up, Diabetes (Type II, eye exam 9-), Hyperlipidemia, Hypertension, and Hypothyroidism      Teller: Follow-up, Diabetes (Type II, eye exam 9-), Hyperlipidemia, Hypertension, and Hypothyroidism    bp higher today- more stress   Is taking all medications as prescribed  Blood sugar and 90 day average sugar reviewed  Results for orders placed or performed in visit on 02/22/22   POC Glycosylated Hemoglobin (Hb A1C)    Specimen: Blood   Result Value Ref Range    Hemoglobin A1C 7.2 %    Lot Number [id number]     Expiration Date 09/28/2023    POC Glucose, Blood    Specimen: Blood   Result Value Ref Range    Glucose 138 (A) 70 - 130 mg/dL    Lot Number [id number]     Expiration Date 08/25/2022      Average sugar is 150  Is on low fat diet and lipitor 10 mg daily    Allergies   Allergen Reactions   • Polymyxin B Other (See Comments)   • Polymyxin B-Trimethoprim Other (See Comments)   • Penicillins Rash       Current Outpatient Medications:   •  ACCU-CHEK GUIDE test strip, Test blood sugars QID, Disp: 400 each, Rfl: 3  •  amLODIPine (NORVASC) 5 MG tablet, Take 1 tablet by mouth Daily., Disp: 90 tablet, Rfl: 1  •  aspirin 81 MG EC tablet, Take 81 mg by mouth Daily., Disp: , Rfl:   •  atorvastatin (LIPITOR) 10 MG tablet, Take 1 tablet by mouth Daily., Disp: 90 tablet, Rfl: 1  •  Blood Glucose Monitoring Suppl (ACCU-CHEK GUIDE) w/Device kit, 1 Device Daily., Disp: 1 kit, Rfl: 0  •  buPROPion XL (WELLBUTRIN XL) 150 MG 24 hr tablet, , Disp: , Rfl:   •  Calcium Citrate-Vitamin D 250-200 MG-UNIT tablet, , Disp: , Rfl:   •  Continuous Blood Gluc Sensor (FreeStyle Kike 14 Day Sensor) misc, Apply 1 each topically Every 14 (Fourteen) Days., Disp: 6 each, Rfl: 0  •  Ferrous Fumarate (IRON) 18 MG tablet controlled-release, Take  by mouth daily., Disp: , Rfl:   •  glucose blood (ONE TOUCH ULTRA TEST) test strip, Test blood sugars QID, Disp: 400 each, Rfl: 3  •  Insulin Glargine (Lantus SoloStar)  "100 UNIT/ML injection pen, INJECT 30 UNITS UNDER THE SKIN ONCE NIGHTLY, Disp: 15 mL, Rfl: 5  •  Insulin Pen Needle (B-D ULTRAFINE III SHORT PEN) 31G X 8 MM misc, USE FOUR TIMES DAILY TO INJECT INSULIN, Disp: 400 each, Rfl: 3  •  Insulin Syringe-Needle U-100 (BD INSULIN SYRINGE ULTRAFINE) 31G X 5/16\" 0.5 ML misc, , Disp: , Rfl:   •  lansoprazole (PREVACID) 30 MG capsule, Take  by mouth., Disp: , Rfl:   •  losartan-hydrochlorothiazide (HYZAAR) 100-12.5 MG per tablet, Take 1 tablet by mouth Daily., Disp: 90 tablet, Rfl: 1  •  metoprolol tartrate (LOPRESSOR) 25 MG tablet, Take 0.5 tablets by mouth 2 (Two) Times a Day., Disp: 90 tablet, Rfl: 1  •  Multiple Vitamins-Minerals (MULTIVITAMIN ADULTS 50+) tablet, , Disp: , Rfl:   •  NovoLOG FlexPen 100 UNIT/ML solution pen-injector sc pen, INJECT 10 UNITS UNDER THE SKIN INTO THE APPROPRIATE AREA AS DIRECTED THREE TIMES A DAY WITH MEALS, Disp: 15 mL, Rfl: 1  •  Omega-3 Fatty Acids (FISH OIL) 1000 MG capsule capsule, Take  by mouth daily., Disp: , Rfl:   •  potassium chloride (K-DUR,KLOR-CON) 10 MEQ CR tablet, Take 1 tablet by mouth 2 (Two) Times a Day With Meals., Disp: 90 tablet, Rfl: 1  •  PRODIGY TWIST TOP LANCETS 28G misc, Use 4 per day to test blood sugars, Disp: 400 each, Rfl: 3  •  zinc gluconate 50 MG tablet, zinc gluconate 50 mg tablet  Daily, Disp: , Rfl:   Patient Active Problem List    Diagnosis    • Mixed anxiety and depressive disorder [F41.8]    • Abdominal bloating [R14.0]    • Preoperative evaluation to rule out surgical contraindication [Z01.818]    • Obstructive sleep apnea syndrome [G47.33]    • Peripheral neuropathy, idiopathic [G60.9]    • Stress fracture of foot [M84.376A]    • Foot pain [M79.673]    • Pain in left foot [M79.672]    • Moderate nonproliferative diabetic retinopathy associated with type 2 diabetes mellitus (HCC) [E11.3399]    • Abnormal liver function tests [R94.5]    • Anemia [D64.9]    • Benign essential hypertension [I10]    • Type 1 " "diabetes mellitus with neurological manifestations (HCC) [E10.49]    • Gastroesophageal reflux disease [K21.9]    • Hyperlipidemia [E78.5]    • Hypokalemia [E87.6]    • Calculus of kidney [N20.0]    • Cyst of ovary [N83.209]    • Renal insufficiency [N28.9]    • Solitary pulmonary nodule [R91.1]    • Vitamin D deficiency [E55.9]    • Abnormal weight loss [R63.4]      Review of Systems   Constitutional: Negative for activity change, appetite change and unexpected weight change.   HENT: Negative for congestion and rhinorrhea.    Eyes: Negative for visual disturbance.   Respiratory: Negative for cough and shortness of breath.    Cardiovascular: Negative for palpitations and leg swelling.   Gastrointestinal: Negative for constipation, diarrhea and nausea.   Genitourinary: Negative for hematuria.   Musculoskeletal: Positive for arthralgias and gait problem. Negative for back pain, joint swelling and myalgias.   Skin: Negative for color change, rash and wound.   Allergic/Immunologic: Negative for environmental allergies, food allergies and immunocompromised state.   Neurological: Negative for dizziness, weakness and light-headedness.   Psychiatric/Behavioral: Negative for confusion, decreased concentration, dysphoric mood and sleep disturbance. The patient is not nervous/anxious.      Social History     Socioeconomic History   • Marital status:    Tobacco Use   • Smoking status: Former Smoker   • Smokeless tobacco: Never Used   Vaping Use   • Vaping Use: Never used   Substance and Sexual Activity   • Alcohol use: Yes     Alcohol/week: 7.0 standard drinks     Types: 7 Glasses of wine per week   • Drug use: No   • Sexual activity: Defer     Family History   Problem Relation Age of Onset   • Diabetes Father    • Diabetes Maternal Grandfather    • Coronary artery disease Other      /72   Pulse 78   Ht 165.1 cm (65\")   Wt 82.8 kg (182 lb 9.6 oz)   SpO2 98%   BMI 30.39 kg/m²   Physical Exam  Vitals and nursing " note reviewed.   Constitutional:       Appearance: Normal appearance. She is well-developed.   HENT:      Head: Normocephalic and atraumatic.   Eyes:      General: Lids are normal.      Extraocular Movements: Extraocular movements intact.      Conjunctiva/sclera: Conjunctivae normal.      Pupils: Pupils are equal, round, and reactive to light.   Neck:      Thyroid: No thyroid mass or thyromegaly.      Vascular: No carotid bruit.      Trachea: Trachea normal. No tracheal deviation.   Cardiovascular:      Rate and Rhythm: Normal rate and regular rhythm.      Heart sounds: Normal heart sounds. No murmur heard.  No friction rub. No gallop.    Pulmonary:      Effort: Pulmonary effort is normal. No respiratory distress.      Breath sounds: Normal breath sounds. No wheezing.   Musculoskeletal:         General: Normal range of motion.      Cervical back: Normal range of motion and neck supple.      Right foot: Deformity, bunion and prominent metatarsal heads present.      Left foot: Deformity, bunion and prominent metatarsal heads present.        Feet:    Feet:      Right foot:      Skin integrity: Callus present.      Left foot:      Skin integrity: Callus present.   Lymphadenopathy:      Cervical: No cervical adenopathy.   Skin:     General: Skin is warm and dry.      Findings: No erythema or rash.      Nails: There is no clubbing.   Neurological:      General: No focal deficit present.      Mental Status: She is alert and oriented to person, place, and time.      Cranial Nerves: No cranial nerve deficit.      Deep Tendon Reflexes: Reflexes abnormal.   Psychiatric:         Mood and Affect: Mood normal.         Speech: Speech normal.         Behavior: Behavior normal.         Thought Content: Thought content normal.         Judgment: Judgment normal.       Results for orders placed or performed in visit on 02/22/22   POC Glycosylated Hemoglobin (Hb A1C)    Specimen: Blood   Result Value Ref Range    Hemoglobin A1C 7.2 %     Lot Number [id number]     Expiration Date 09/28/2023    POC Glucose, Blood    Specimen: Blood   Result Value Ref Range    Glucose 138 (A) 70 - 130 mg/dL    Lot Number [id number]     Expiration Date 08/25/2022      Diagnoses and all orders for this visit:    1. 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 (HCC) (Primary)  Assessment & Plan:  Blood sugar and 90 day average sugar reviewed  Results for orders placed or performed in visit on 02/22/22   POC Glycosylated Hemoglobin (Hb A1C)    Specimen: Blood   Result Value Ref Range    Hemoglobin A1C 7.2 %    Lot Number [id number]     Expiration Date 09/28/2023    POC Glucose, Blood    Specimen: Blood   Result Value Ref Range    Glucose 138 (A) 70 - 130 mg/dL    Lot Number [id number]     Expiration Date 08/25/2022      Doing well overall   Downloaded and reviewed royce sensor, 11 days of sensor data  Sugar tends to rise as she starts eating for the day   Highest at night  Discussed giving correction for higher pre dinner sugar (raise novolog 2 units0  Is utd with eye exam  Foot care discussed   Ur alb neg  F/u 3-4 months     Orders:  -     POC Glycosylated Hemoglobin (Hb A1C)  -     POC Glucose, Blood    2. Pure hypercholesterolemia  Assessment & Plan:  Continue lipitor 10 mg daily   ldl 11/21 80      3. Benign essential hypertension  Assessment & Plan:  Marginal systolic bp elevation   Watch salt, increase activity level       Other orders  -     amLODIPine (NORVASC) 5 MG tablet; Take 1 tablet by mouth Daily.  Dispense: 90 tablet; Refill: 1  -     atorvastatin (LIPITOR) 10 MG tablet; Take 1 tablet by mouth Daily.  Dispense: 90 tablet; Refill: 1  -     metoprolol tartrate (LOPRESSOR) 25 MG tablet; Take 0.5 tablets by mouth 2 (Two) Times a Day.  Dispense: 90 tablet; Refill: 1  Return in about 3 months (around 5/22/2022) for Recheck.    Maria Eugenia Lawrence MD  Signed Maria Eugenia Lawrence MD

## 2022-02-22 NOTE — ASSESSMENT & PLAN NOTE
Blood sugar and 90 day average sugar reviewed  Results for orders placed or performed in visit on 02/22/22   POC Glycosylated Hemoglobin (Hb A1C)    Specimen: Blood   Result Value Ref Range    Hemoglobin A1C 7.2 %    Lot Number [id number]     Expiration Date 09/28/2023    POC Glucose, Blood    Specimen: Blood   Result Value Ref Range    Glucose 138 (A) 70 - 130 mg/dL    Lot Number [id number]     Expiration Date 08/25/2022      Doing well overall   Downloaded and reviewed royce sensor, 11 days of sensor data  Sugar tends to rise as she starts eating for the day   Highest at night  Discussed giving correction for higher pre dinner sugar (raise novolog 2 units0  Is utd with eye exam  Foot care discussed   Ur alb neg  F/u 3-4 months

## 2022-03-18 RX ORDER — INSULIN GLARGINE 100 [IU]/ML
INJECTION, SOLUTION SUBCUTANEOUS
Qty: 30 ML | Refills: 1 | Status: SHIPPED | OUTPATIENT
Start: 2022-03-18 | End: 2022-03-18 | Stop reason: SDUPTHER

## 2022-03-18 RX ORDER — FLASH GLUCOSE SENSOR
KIT MISCELLANEOUS
Qty: 6 EACH | Refills: 1 | Status: SHIPPED | OUTPATIENT
Start: 2022-03-18

## 2022-03-18 RX ORDER — INSULIN ASPART 100 [IU]/ML
INJECTION, SOLUTION INTRAVENOUS; SUBCUTANEOUS
Qty: 30 ML | Refills: 1 | Status: SHIPPED | OUTPATIENT
Start: 2022-03-18 | End: 2022-03-18 | Stop reason: SDUPTHER

## 2022-03-18 RX ORDER — INSULIN GLARGINE 100 [IU]/ML
30 INJECTION, SOLUTION SUBCUTANEOUS NIGHTLY
Qty: 30 ML | Refills: 1 | Status: SHIPPED | OUTPATIENT
Start: 2022-03-18 | End: 2023-01-31 | Stop reason: SDUPTHER

## 2022-03-18 RX ORDER — INSULIN ASPART 100 [IU]/ML
10 INJECTION, SOLUTION INTRAVENOUS; SUBCUTANEOUS
Qty: 30 ML | Refills: 1 | Status: SHIPPED | OUTPATIENT
Start: 2022-03-18 | End: 2023-01-31 | Stop reason: SDUPTHER

## 2022-04-05 ENCOUNTER — TELEPHONE (OUTPATIENT)
Dept: ENDOCRINOLOGY | Facility: CLINIC | Age: 77
End: 2022-04-05

## 2022-04-05 NOTE — TELEPHONE ENCOUNTER
Pt was advised we have not received forms from [facility] recently   She will call them and ask to be sent again

## 2022-04-05 NOTE — TELEPHONE ENCOUNTER
[facility] IS TELLING PATIENT THEY NEED INFORMATION FROM US IN ORDER TO GET PATIENT MORE MIKKI SENSORS. [facility] STATES THEY FAXED US THE REQUEST AND PATIENT IS CALLING TO CHECK STATUS. PHONE NUMBER [phone]

## 2022-04-12 NOTE — TELEPHONE ENCOUNTER
PATIENT CALLED TO CHECK STATUS OF REQUEST. ADVISED PATIENT THAT THESE RX WERE SENT TO [facility]. PATIENT STATES THAT BOTH INSULIN RX SHOULD GO TO [facility] INSTEAD.    1 or 2

## 2022-04-14 NOTE — TELEPHONE ENCOUNTER
Pt called is out of freestyle royce sensor pt waiting on [facility] to send supplies. Pt wants to knowif we have any samples. Pt last seen 02/22/22 pt next f/u 06/15/22

## 2022-04-14 NOTE — TELEPHONE ENCOUNTER
Pt was advised a freestyle royce sensor sample is at the  to be picked up and she voiced understanding

## 2022-05-24 RX ORDER — POTASSIUM CHLORIDE 750 MG/1
10 TABLET, EXTENDED RELEASE ORAL 2 TIMES DAILY WITH MEALS
Qty: 90 TABLET | Refills: 1 | Status: SHIPPED | OUTPATIENT
Start: 2022-05-24 | End: 2022-09-16

## 2022-05-24 NOTE — TELEPHONE ENCOUNTER
PATIENT CALLING TO REQUEST REFILL OF HER POTASSIUM PRESCRIPTION.    PATIENT STATES SHE HAS 5 DAYS SUPPLY LEFT.

## 2022-06-13 RX ORDER — AMLODIPINE BESYLATE 5 MG/1
TABLET ORAL
Qty: 90 TABLET | Refills: 1 | Status: SHIPPED | OUTPATIENT
Start: 2022-06-13

## 2022-06-13 RX ORDER — LOSARTAN POTASSIUM AND HYDROCHLOROTHIAZIDE 12.5; 1 MG/1; MG/1
TABLET ORAL
Qty: 90 TABLET | Refills: 1 | Status: SHIPPED | OUTPATIENT
Start: 2022-06-13 | End: 2023-04-06

## 2022-06-13 RX ORDER — ATORVASTATIN CALCIUM 10 MG/1
TABLET, FILM COATED ORAL
Qty: 90 TABLET | Refills: 1 | Status: SHIPPED | OUTPATIENT
Start: 2022-06-13

## 2022-06-13 RX ORDER — POTASSIUM CHLORIDE 750 MG/1
TABLET, EXTENDED RELEASE ORAL
Qty: 180 TABLET | OUTPATIENT
Start: 2022-06-13

## 2022-06-15 ENCOUNTER — OFFICE VISIT (OUTPATIENT)
Dept: ENDOCRINOLOGY | Facility: CLINIC | Age: 77
End: 2022-06-15

## 2022-06-15 VITALS
OXYGEN SATURATION: 96 % | HEART RATE: 72 BPM | BODY MASS INDEX: 29.16 KG/M2 | HEIGHT: 65 IN | WEIGHT: 175 LBS | SYSTOLIC BLOOD PRESSURE: 140 MMHG | DIASTOLIC BLOOD PRESSURE: 70 MMHG

## 2022-06-15 DIAGNOSIS — E55.9 VITAMIN D DEFICIENCY: ICD-10-CM

## 2022-06-15 DIAGNOSIS — E10.42 TYPE 1 DIABETES MELLITUS WITH DIABETIC POLYNEUROPATHY: Primary | ICD-10-CM

## 2022-06-15 DIAGNOSIS — E78.00 PURE HYPERCHOLESTEROLEMIA: ICD-10-CM

## 2022-06-15 DIAGNOSIS — I10 BENIGN ESSENTIAL HYPERTENSION: ICD-10-CM

## 2022-06-15 LAB
EXPIRATION DATE: ABNORMAL
EXPIRATION DATE: NORMAL
GLUCOSE BLDC GLUCOMTR-MCNC: 260 MG/DL (ref 70–130)
HBA1C MFR BLD: 6.7 %
Lab: ABNORMAL
Lab: NORMAL

## 2022-06-15 PROCEDURE — 83036 HEMOGLOBIN GLYCOSYLATED A1C: CPT | Performed by: INTERNAL MEDICINE

## 2022-06-15 PROCEDURE — 3044F HG A1C LEVEL LT 7.0%: CPT | Performed by: INTERNAL MEDICINE

## 2022-06-15 PROCEDURE — 99214 OFFICE O/P EST MOD 30 MIN: CPT | Performed by: INTERNAL MEDICINE

## 2022-06-15 PROCEDURE — 95251 CONT GLUC MNTR ANALYSIS I&R: CPT | Performed by: INTERNAL MEDICINE

## 2022-06-15 NOTE — PROGRESS NOTES
Chinyere Olvrea 77 y.o.  CC: Diabetes (Type I), Hypertension, and Hyperlipidemia      Manzanita: Diabetes (Type I), Hypertension, and Hyperlipidemia    Blood sugar and 90 day average sugar reviewed  Results for orders placed or performed in visit on 06/15/22   POC Glucose, Blood    Specimen: Blood   Result Value Ref Range    Glucose 260 (A) 70 - 130 mg/dL    Lot Number [id number]     Expiration Date 01/13/23    POC Glycosylated Hemoglobin (Hb A1C)    Specimen: Blood   Result Value Ref Range    Hemoglobin A1C 6.7 %    Lot Number [id number]     Expiration Date 02/18/24      Average sugar is excellent  Higher sugar this morning- forgot novolog  Eating less carbs  bp is good  Downloaded kike and reviewed  Is utd with eye exam    Allergies   Allergen Reactions   • Polymyxin B Other (See Comments)   • Polymyxin B-Trimethoprim Other (See Comments)   • Penicillins Rash       Current Outpatient Medications:   •  ACCU-CHEK GUIDE test strip, Test blood sugars QID, Disp: 400 each, Rfl: 3  •  amLODIPine (NORVASC) 5 MG tablet, TAKE 1 TABLET EVERY DAY, Disp: 90 tablet, Rfl: 1  •  aspirin 81 MG EC tablet, Take 81 mg by mouth Daily., Disp: , Rfl:   •  atorvastatin (LIPITOR) 10 MG tablet, TAKE 1 TABLET EVERY DAY, Disp: 90 tablet, Rfl: 1  •  Blood Glucose Monitoring Suppl (ACCU-CHEK GUIDE) w/Device kit, 1 Device Daily., Disp: 1 kit, Rfl: 0  •  buPROPion XL (WELLBUTRIN XL) 150 MG 24 hr tablet, , Disp: , Rfl:   •  Calcium Citrate-Vitamin D 250-200 MG-UNIT tablet, , Disp: , Rfl:   •  Continuous Blood Gluc Sensor (FreeStyle Kike 14 Day Sensor) misc, USE ON APPROPRIATE AREA AS DIRECTED AND CHANGE SENSOR EVERY 14 DAYS., Disp: 6 each, Rfl: 1  •  Ferrous Fumarate (IRON) 18 MG tablet controlled-release, Take  by mouth daily., Disp: , Rfl:   •  glucose blood (ONE TOUCH ULTRA TEST) test strip, Test blood sugars QID, Disp: 400 each, Rfl: 3  •  insulin aspart (NovoLOG FlexPen) 100 UNIT/ML solution pen-injector sc pen, Inject 10 Units under the skin  "into the appropriate area as directed 3 (Three) Times a Day Before Meals., Disp: 30 mL, Rfl: 1  •  Insulin Glargine (Lantus SoloStar) 100 UNIT/ML injection pen, Inject 30 Units under the skin into the appropriate area as directed Every Night., Disp: 30 mL, Rfl: 1  •  Insulin Pen Needle (B-D ULTRAFINE III SHORT PEN) 31G X 8 MM misc, USE FOUR TIMES DAILY TO INJECT INSULIN, Disp: 400 each, Rfl: 3  •  Insulin Syringe-Needle U-100 31G X 5/16\" 0.5 ML misc, , Disp: , Rfl:   •  lansoprazole (PREVACID) 30 MG capsule, Take  by mouth., Disp: , Rfl:   •  losartan-hydrochlorothiazide (HYZAAR) 100-12.5 MG per tablet, TAKE 1 TABLET EVERY DAY, Disp: 90 tablet, Rfl: 1  •  metoprolol tartrate (LOPRESSOR) 25 MG tablet, TAKE 1/2 TABLET TWICE DAILY, Disp: 90 tablet, Rfl: 1  •  Multiple Vitamins-Minerals (MULTIVITAMIN ADULTS 50+) tablet, , Disp: , Rfl:   •  Omega-3 Fatty Acids (FISH OIL) 1000 MG capsule capsule, Take  by mouth daily., Disp: , Rfl:   •  potassium chloride (K-DUR,KLOR-CON) 10 MEQ CR tablet, Take 1 tablet by mouth 2 (Two) Times a Day With Meals., Disp: 90 tablet, Rfl: 1  •  PRODIGY TWIST TOP LANCETS 28G misc, Use 4 per day to test blood sugars, Disp: 400 each, Rfl: 3  •  zinc gluconate 50 MG tablet, zinc gluconate 50 mg tablet  Daily, Disp: , Rfl:   Patient Active Problem List    Diagnosis    • Mixed anxiety and depressive disorder [F41.8]    • Abdominal bloating [R14.0]    • Preoperative evaluation to rule out surgical contraindication [Z01.818]    • Obstructive sleep apnea syndrome [G47.33]    • Peripheral neuropathy, idiopathic [G60.9]    • Stress fracture of foot [M84.376A]    • Foot pain [M79.673]    • Pain in left foot [M79.672]    • Moderate nonproliferative diabetic retinopathy associated with type 2 diabetes mellitus (HCC) [E11.3399]    • Abnormal liver function tests [R79.89]    • Anemia [D64.9]    • Benign essential hypertension [I10]    • 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 (HCC) [E10.49]  " "  • Gastroesophageal reflux disease [K21.9]    • Hyperlipidemia [E78.5]    • Hypokalemia [E87.6]    • Calculus of kidney [N20.0]    • Cyst of ovary [N83.209]    • Renal insufficiency [N28.9]    • Solitary pulmonary nodule [R91.1]    • Vitamin D deficiency [E55.9]    • Abnormal weight loss [R63.4]      Review of Systems   Constitutional: Positive for activity change, appetite change and unexpected weight change.   HENT: Negative for congestion and rhinorrhea.    Eyes: Negative for visual disturbance.   Respiratory: Negative for cough and shortness of breath.    Cardiovascular: Negative for palpitations and leg swelling.   Gastrointestinal: Negative for constipation, diarrhea and nausea.   Genitourinary: Negative for hematuria.   Musculoskeletal: Negative for arthralgias, back pain, gait problem, joint swelling and myalgias.   Skin: Negative for color change, rash and wound.   Allergic/Immunologic: Negative for environmental allergies, food allergies and immunocompromised state.   Neurological: Negative for dizziness, weakness and light-headedness.   Psychiatric/Behavioral: Negative for confusion, decreased concentration, dysphoric mood and sleep disturbance. The patient is not nervous/anxious.      Social History     Socioeconomic History   • Marital status:    Tobacco Use   • Smoking status: Former Smoker   • Smokeless tobacco: Never Used   Vaping Use   • Vaping Use: Never used   Substance and Sexual Activity   • Alcohol use: Yes     Alcohol/week: 7.0 standard drinks     Types: 7 Glasses of wine per week     Comment: social   • Drug use: No   • Sexual activity: Defer     Family History   Problem Relation Age of Onset   • Diabetes Father    • Diabetes Maternal Grandfather    • Coronary artery disease Other      /70 (BP Location: Left arm, Patient Position: Sitting, Cuff Size: Adult)   Pulse 72   Ht 165.1 cm (65\")   Wt 79.4 kg (175 lb)   SpO2 96%   BMI 29.12 kg/m²   Physical Exam  Vitals and nursing " note reviewed.   Constitutional:       Appearance: Normal appearance. She is well-developed.   HENT:      Head: Normocephalic and atraumatic.   Eyes:      General: Lids are normal.      Extraocular Movements: Extraocular movements intact.      Conjunctiva/sclera: Conjunctivae normal.      Pupils: Pupils are equal, round, and reactive to light.   Neck:      Thyroid: No thyroid mass or thyromegaly.      Vascular: No carotid bruit.      Trachea: Trachea normal. No tracheal deviation.   Cardiovascular:      Rate and Rhythm: Normal rate and regular rhythm.      Pulses: Normal pulses.      Heart sounds: Normal heart sounds. No murmur heard.    No friction rub. No gallop.   Pulmonary:      Effort: Pulmonary effort is normal. No respiratory distress.      Breath sounds: Normal breath sounds. No wheezing.   Musculoskeletal:         General: No deformity. Normal range of motion.      Cervical back: Normal range of motion and neck supple.   Lymphadenopathy:      Cervical: No cervical adenopathy.   Skin:     General: Skin is warm and dry.      Findings: No erythema or rash.      Nails: There is no clubbing.   Neurological:      General: No focal deficit present.      Mental Status: She is alert and oriented to person, place, and time.      Cranial Nerves: No cranial nerve deficit.      Deep Tendon Reflexes: Reflexes abnormal.   Psychiatric:         Mood and Affect: Mood normal.         Speech: Speech normal.         Behavior: Behavior normal.         Thought Content: Thought content normal.         Judgment: Judgment normal.       Results for orders placed or performed in visit on 06/15/22   POC Glucose, Blood    Specimen: Blood   Result Value Ref Range    Glucose 260 (A) 70 - 130 mg/dL    Lot Number [id number]     Expiration Date 01/13/23    POC Glycosylated Hemoglobin (Hb A1C)    Specimen: Blood   Result Value Ref Range    Hemoglobin A1C 6.7 %    Lot Number [id number]     Expiration Date 02/18/24      Diagnoses and all orders  for this visit:    1. 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 (HCC) (Primary)  Assessment & Plan:  Blood sugar and 90 day average sugar reviewed  Results for orders placed or performed in visit on 06/15/22   POC Glucose, Blood    Specimen: Blood   Result Value Ref Range    Glucose 260 (A) 70 - 130 mg/dL    Lot Number [id number]     Expiration Date 01/13/23    POC Glycosylated Hemoglobin (Hb A1C)    Specimen: Blood   Result Value Ref Range    Hemoglobin A1C 6.7 %    Lot Number [id number]     Expiration Date 02/18/24      Average sugar is 140   Is utd with eye exam  No foot lesion- does have hammertoes and callus dorsal 3rd toe  Ur alb neg  F/u 3-4 months   Downloaded sensor and reviewed 10 days of sensor data - higher sugars pp meals and we discussed evening correction prior to dinner to improve control   No severe low sugars noted      Orders:  -     POC Glucose, Blood  -     POC Glycosylated Hemoglobin (Hb A1C)  -     Comprehensive Metabolic Panel    2. Benign essential hypertension  Assessment & Plan:  bp is controlled  Continue monitoring and medication       3. Pure hypercholesterolemia  Assessment & Plan:  Is eating low fat diet, taking lipitor 10 mg daily   Check flp     Orders:  -     Lipid Panel  -     TSH  -     T4, Free    4. Vitamin D deficiency  Assessment & Plan:  Continue supplement  Update levels     Orders:  -     Vitamin D 25 Hydroxy    Maria Eugenia Lawrence MD  Signed Maria Eugenia Lawrence MD

## 2022-06-16 LAB
25(OH)D3+25(OH)D2 SERPL-MCNC: 54 NG/ML (ref 30–100)
ALBUMIN SERPL-MCNC: 4.5 G/DL (ref 3.5–5.2)
ALBUMIN/GLOB SERPL: 2.1 G/DL
ALP SERPL-CCNC: 82 U/L (ref 39–117)
ALT SERPL-CCNC: 23 U/L (ref 1–33)
AST SERPL-CCNC: 22 U/L (ref 1–32)
BILIRUB SERPL-MCNC: 0.5 MG/DL (ref 0–1.2)
BUN SERPL-MCNC: 21 MG/DL (ref 8–23)
BUN/CREAT SERPL: 19.1 (ref 7–25)
CALCIUM SERPL-MCNC: 9.6 MG/DL (ref 8.6–10.5)
CHLORIDE SERPL-SCNC: 103 MMOL/L (ref 98–107)
CHOLEST SERPL-MCNC: 143 MG/DL (ref 0–200)
CO2 SERPL-SCNC: 25.6 MMOL/L (ref 22–29)
CREAT SERPL-MCNC: 1.1 MG/DL (ref 0.57–1)
EGFRCR SERPLBLD CKD-EPI 2021: 51.9 ML/MIN/1.73
GLOBULIN SER CALC-MCNC: 2.1 GM/DL
GLUCOSE SERPL-MCNC: 207 MG/DL (ref 65–99)
HDLC SERPL-MCNC: 43 MG/DL (ref 40–60)
LDLC SERPL CALC-MCNC: 79 MG/DL (ref 0–100)
POTASSIUM SERPL-SCNC: 4.7 MMOL/L (ref 3.5–5.2)
PROT SERPL-MCNC: 6.6 G/DL (ref 6–8.5)
SODIUM SERPL-SCNC: 142 MMOL/L (ref 136–145)
T4 FREE SERPL-MCNC: 1.3 NG/DL (ref 0.93–1.7)
TRIGL SERPL-MCNC: 118 MG/DL (ref 0–150)
TSH SERPL DL<=0.005 MIU/L-ACNC: 1.82 UIU/ML (ref 0.27–4.2)
VLDLC SERPL CALC-MCNC: 21 MG/DL (ref 5–40)

## 2022-06-16 NOTE — ASSESSMENT & PLAN NOTE
Blood sugar and 90 day average sugar reviewed  Results for orders placed or performed in visit on 06/15/22   POC Glucose, Blood    Specimen: Blood   Result Value Ref Range    Glucose 260 (A) 70 - 130 mg/dL    Lot Number [id number]     Expiration Date 01/13/23    POC Glycosylated Hemoglobin (Hb A1C)    Specimen: Blood   Result Value Ref Range    Hemoglobin A1C 6.7 %    Lot Number [id number]     Expiration Date 02/18/24      Average sugar is 140   Is utd with eye exam  No foot lesion- does have hammertoes and callus dorsal 3rd toe  Ur alb neg  F/u 3-4 months   Downloaded sensor and reviewed 10 days of sensor data - higher sugars pp meals and we discussed evening correction prior to dinner to improve control   No severe low sugars noted

## 2022-09-16 RX ORDER — PEN NEEDLE, DIABETIC 31 GX5/16"
NEEDLE, DISPOSABLE MISCELLANEOUS
Qty: 400 EACH | Refills: 3 | Status: SHIPPED | OUTPATIENT
Start: 2022-09-16

## 2022-09-16 RX ORDER — POTASSIUM CHLORIDE 750 MG/1
TABLET, EXTENDED RELEASE ORAL
Qty: 180 TABLET | Refills: 0 | Status: SHIPPED | OUTPATIENT
Start: 2022-09-16 | End: 2023-02-08

## 2022-11-01 ENCOUNTER — OFFICE VISIT (OUTPATIENT)
Dept: ENDOCRINOLOGY | Facility: CLINIC | Age: 77
End: 2022-11-01

## 2022-11-01 VITALS
WEIGHT: 165 LBS | DIASTOLIC BLOOD PRESSURE: 64 MMHG | SYSTOLIC BLOOD PRESSURE: 118 MMHG | BODY MASS INDEX: 27.49 KG/M2 | HEART RATE: 79 BPM | HEIGHT: 65 IN | OXYGEN SATURATION: 98 %

## 2022-11-01 DIAGNOSIS — E10.42 TYPE 1 DIABETES MELLITUS WITH DIABETIC POLYNEUROPATHY: Primary | ICD-10-CM

## 2022-11-01 DIAGNOSIS — I10 BENIGN ESSENTIAL HYPERTENSION: ICD-10-CM

## 2022-11-01 LAB
EXPIRATION DATE: ABNORMAL
EXPIRATION DATE: NORMAL
GLUCOSE BLDC GLUCOMTR-MCNC: 134 MG/DL (ref 70–130)
HBA1C MFR BLD: 6.3 %
Lab: ABNORMAL
Lab: NORMAL

## 2022-11-01 PROCEDURE — 99214 OFFICE O/P EST MOD 30 MIN: CPT | Performed by: INTERNAL MEDICINE

## 2022-11-01 PROCEDURE — 3044F HG A1C LEVEL LT 7.0%: CPT | Performed by: INTERNAL MEDICINE

## 2022-11-01 PROCEDURE — 95251 CONT GLUC MNTR ANALYSIS I&R: CPT | Performed by: INTERNAL MEDICINE

## 2022-11-01 PROCEDURE — 83036 HEMOGLOBIN GLYCOSYLATED A1C: CPT | Performed by: INTERNAL MEDICINE

## 2022-11-01 NOTE — PROGRESS NOTES
Chinyere Olvera 77 y.o.  CC:Follow-up, Diabetes (Type I, eye exam June 2022 Dr Bowers/), Hypertension, and Hyperlipidemia      [place]: Follow-up, Diabetes (Type I, eye exam June 2022 Dr Bowers/), Hypertension, and Hyperlipidemia    Blood sugar and 90 day average sugar reviewed  Results for orders placed or performed in visit on 11/01/22   POC Glycosylated Hemoglobin (Hb A1C)    Specimen: Blood   Result Value Ref Range    Hemoglobin A1C 6.3 %    Lot Number [id number]     Expiration Date 07/04/2024    POC Glucose, Blood    Specimen: Blood   Result Value Ref Range    Glucose 134 (A) 70 - 130 mg/dL    Lot Number [id number]     Expiration Date 05/20/2023      Average sugar is 140  bp is good   Interim covid  Low iron - on supplement  Has had screening  Is eating low fat diet and atorvastatin 10 mg daily  Lab work reviewed  ckd stable 6/22  LDL 79  tsh normal    Allergies   Allergen Reactions   • Polymyxin B Other (See Comments)   • Polymyxin B-Trimethoprim Other (See Comments)   • Penicillins Rash       Current Outpatient Medications:   •  ACCU-CHEK GUIDE test strip, Test blood sugars QID, Disp: 400 each, Rfl: 3  •  amLODIPine (NORVASC) 5 MG tablet, TAKE 1 TABLET EVERY DAY, Disp: 90 tablet, Rfl: 1  •  aspirin 81 MG EC tablet, Take 81 mg by mouth Daily., Disp: , Rfl:   •  atorvastatin (LIPITOR) 10 MG tablet, TAKE 1 TABLET EVERY DAY, Disp: 90 tablet, Rfl: 1  •  Blood Glucose Monitoring Suppl (ACCU-CHEK GUIDE) w/Device kit, 1 Device Daily., Disp: 1 kit, Rfl: 0  •  buPROPion XL (WELLBUTRIN XL) 150 MG 24 hr tablet, , Disp: , Rfl:   •  Calcium Citrate-Vitamin D 250-200 MG-UNIT tablet, , Disp: , Rfl:   •  Continuous Blood Gluc Sensor (FreeStyle Kike 14 Day Sensor) misc, USE ON APPROPRIATE AREA AS DIRECTED AND CHANGE SENSOR EVERY 14 DAYS., Disp: 6 each, Rfl: 1  •  Ferrous Fumarate (IRON) 18 MG tablet controlled-release, Take  by mouth daily., Disp: , Rfl:   •  glucose blood (ONE TOUCH ULTRA TEST) test strip, Test blood  "sugars QID, Disp: 400 each, Rfl: 3  •  insulin aspart (NovoLOG FlexPen) 100 UNIT/ML solution pen-injector sc pen, Inject 10 Units under the skin into the appropriate area as directed 3 (Three) Times a Day Before Meals., Disp: 30 mL, Rfl: 1  •  Insulin Glargine (Lantus SoloStar) 100 UNIT/ML injection pen, Inject 30 Units under the skin into the appropriate area as directed Every Night., Disp: 30 mL, Rfl: 1  •  Insulin Pen Needle (B-D ULTRAFINE III SHORT PEN) 31G X 8 MM misc, USE FOUR TIMES DAILY TO INJECT INSULIN, Disp: 400 each, Rfl: 3  •  Insulin Syringe-Needle U-100 31G X 5/16\" 0.5 ML misc, , Disp: , Rfl:   •  lansoprazole (PREVACID) 30 MG capsule, Take  by mouth., Disp: , Rfl:   •  losartan-hydrochlorothiazide (HYZAAR) 100-12.5 MG per tablet, TAKE 1 TABLET EVERY DAY, Disp: 90 tablet, Rfl: 1  •  metoprolol tartrate (LOPRESSOR) 25 MG tablet, TAKE 1/2 TABLET TWICE DAILY, Disp: 90 tablet, Rfl: 1  •  Multiple Vitamins-Minerals (MULTIVITAMIN ADULTS 50+) tablet, , Disp: , Rfl:   •  Omega-3 Fatty Acids (FISH OIL) 1000 MG capsule capsule, Take  by mouth daily., Disp: , Rfl:   •  potassium chloride (K-DUR,KLOR-CON) 10 MEQ CR tablet, TAKE 1 TABLET TWICE DAILY WITH MEALS, Disp: 180 tablet, Rfl: 0  •  PRODIGY TWIST TOP LANCETS 28G misc, Use 4 per day to test blood sugars, Disp: 400 each, Rfl: 3  •  zinc gluconate 50 MG tablet, zinc gluconate 50 mg tablet  Daily, Disp: , Rfl:   Patient Active Problem List    Diagnosis    • Mixed anxiety and depressive disorder [F41.8]    • Abdominal bloating [R14.0]    • Preoperative evaluation to rule out surgical contraindication [Z01.818]    • Obstructive sleep apnea syndrome [G47.33]    • Peripheral neuropathy, idiopathic [G60.9]    • Stress fracture of foot [M84.376A]    • Foot pain [M79.673]    • Pain in left foot [M79.672]    • Moderate nonproliferative diabetic retinopathy associated with type 2 diabetes mellitus (HCC) [E11.4717]    • Abnormal liver function tests [R79.89]    • Anemia " "[D64.9]    • Benign essential hypertension [I10]    • 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 (HCC) [E10.49]    • Gastroesophageal reflux disease [K21.9]    • Hyperlipidemia [E78.5]    • Hypokalemia [E87.6]    • Calculus of kidney [N20.0]    • Cyst of ovary [N83.209]    • Renal insufficiency [N28.9]    • Solitary pulmonary nodule [R91.1]    • Vitamin D deficiency [E55.9]    • Abnormal weight loss [R63.4]      Review of Systems   Constitutional: Negative for activity change, appetite change and unexpected weight change.   HENT: Negative for congestion and rhinorrhea.    Eyes: Negative for visual disturbance.   Respiratory: Negative for cough and shortness of breath.    Cardiovascular: Negative for palpitations and leg swelling.   Gastrointestinal: Negative for constipation, diarrhea and nausea.   Genitourinary: Negative for hematuria.   Musculoskeletal: Negative for arthralgias, back pain, gait problem, joint swelling and myalgias.   Skin: Negative for color change, rash and wound.   Allergic/Immunologic: Negative for environmental allergies, food allergies and immunocompromised state.   Neurological: Negative for dizziness, weakness and light-headedness.   Psychiatric/Behavioral: Negative for confusion, decreased concentration, dysphoric mood and sleep disturbance. The patient is not nervous/anxious.      Social History     Socioeconomic History   • Marital status:    Tobacco Use   • Smoking status: Former   • Smokeless tobacco: Never   Vaping Use   • Vaping Use: Never used   Substance and Sexual Activity   • Alcohol use: Yes     Alcohol/week: 3.0 standard drinks     Types: 3 Glasses of wine per week     Comment: social   • Drug use: No   • Sexual activity: Defer     Family History   Problem Relation Age of Onset   • Diabetes Father    • Diabetes Maternal Grandfather    • Coronary artery disease Other      /64   Pulse 79   Ht 165.1 cm (65\")   Wt 74.8 kg (165 lb)   SpO2 98%   BMI " 27.46 kg/m²   Physical Exam  Vitals and nursing note reviewed.   Constitutional:       Appearance: Normal appearance. She is well-developed.   HENT:      Head: Normocephalic and atraumatic.   Eyes:      General: Lids are normal.      Extraocular Movements: Extraocular movements intact.      Conjunctiva/sclera: Conjunctivae normal.      Pupils: Pupils are equal, round, and reactive to light.   Neck:      Thyroid: No thyroid mass or thyromegaly.      Vascular: No carotid bruit.      Trachea: Trachea normal. No tracheal deviation.   Cardiovascular:      Rate and Rhythm: Normal rate and regular rhythm.      Pulses: Normal pulses.      Heart sounds: Normal heart sounds. No murmur heard.    No friction rub. No gallop.   Pulmonary:      Effort: Pulmonary effort is normal. No respiratory distress.      Breath sounds: Normal breath sounds. No wheezing.   Musculoskeletal:         General: Deformity present. Normal range of motion.      Cervical back: Normal range of motion and neck supple.      Comments: Bilateral second hammer toes   Lymphadenopathy:      Cervical: No cervical adenopathy.   Skin:     General: Skin is warm and dry.      Findings: No erythema or rash.      Nails: There is no clubbing.   Neurological:      General: No focal deficit present.      Mental Status: She is alert and oriented to person, place, and time.      Cranial Nerves: No cranial nerve deficit.      Deep Tendon Reflexes: Reflexes are normal and symmetric. Reflexes normal.   Psychiatric:         Speech: Speech normal.         Behavior: Behavior normal.         Thought Content: Thought content normal.         Judgment: Judgment normal.       Results for orders placed or performed in visit on 11/01/22   POC Glycosylated Hemoglobin (Hb A1C)    Specimen: Blood   Result Value Ref Range    Hemoglobin A1C 6.3 %    Lot Number [id number]     Expiration Date 07/04/2024    POC Glucose, Blood    Specimen: Blood   Result Value Ref Range    Glucose 134 (A) 70 -  130 mg/dL    Lot Number [id number]     Expiration Date 05/20/2023      Diagnoses and all orders for this visit:    1. 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 (HCC) (Primary)  Assessment & Plan:  Neuropathy with hammertoes  Blood sugar and 90 day average sugar reviewed  Results for orders placed or performed in visit on 11/01/22   POC Glycosylated Hemoglobin (Hb A1C)    Specimen: Blood   Result Value Ref Range    Hemoglobin A1C 6.3 %    Lot Number [id number]     Expiration Date 07/04/2024    POC Glucose, Blood    Specimen: Blood   Result Value Ref Range    Glucose 134 (A) 70 - 130 mg/dL    Lot Number [id number]     Expiration Date 05/20/2023      Has lost about 15 lbs  Has reduced novolog by half  Downloaded sensor and reviewed 11 days of sensor data- lower overnight sugar  Recommended reducing lantus to 24 u (down from 30 u)- risk of nocturnal low sugar discussed  Is utd with preventive care  Foot care reviewed  Can further reduce lantus if any overnight/fasting sugar less than 80  F/u 3-4 months     Orders:  -     POC Glycosylated Hemoglobin (Hb A1C)  -     POC Glucose, Blood    2. Benign essential hypertension  Assessment & Plan:  bp is well controlled taking amlodipine, lopressor and losartan hctz  Also taking potassium supplement with recent normal potassium     Return in about 3 months (around 2/1/2023).    Maria Eugenia Lawrence MD  Signed Maria Eugenia Lawrence MD

## 2022-11-01 NOTE — ASSESSMENT & PLAN NOTE
[facility]  Blood sugar and 90 day average sugar reviewed  Results for orders placed or performed in visit on 11/01/22   POC Glycosylated Hemoglobin (Hb A1C)    Specimen: Blood   Result Value Ref Range    Hemoglobin A1C 6.3 %    Lot Number [id number]     Expiration Date 07/04/2024    POC Glucose, Blood    Specimen: Blood   Result Value Ref Range    Glucose 134 (A) 70 - 130 mg/dL    Lot Number [id number]     Expiration Date 05/20/2023      Has lost about 15 lbs  Has reduced novolog by half  Downloaded sensor and reviewed 11 days of sensor data- lower overnight sugar  Recommended reducing lantus to 24 u (down from 30 u)- risk of nocturnal low sugar discussed  Is utd with preventive care  Foot care reviewed  Can further reduce lantus if any overnight/fasting sugar less than 80  F/u 3-4 months

## 2022-11-01 NOTE — ASSESSMENT & PLAN NOTE
bp is well controlled taking amlodipine, lopressor and losartan hctz  Also taking potassium supplement with recent normal potassium

## 2023-02-01 RX ORDER — INSULIN ASPART 100 [IU]/ML
10 INJECTION, SOLUTION INTRAVENOUS; SUBCUTANEOUS
Qty: 30 ML | Refills: 1 | Status: SHIPPED | OUTPATIENT
Start: 2023-02-01

## 2023-02-01 RX ORDER — INSULIN GLARGINE 100 [IU]/ML
30 INJECTION, SOLUTION SUBCUTANEOUS NIGHTLY
Qty: 30 ML | Refills: 1 | Status: SHIPPED | OUTPATIENT
Start: 2023-02-01

## 2023-02-06 ENCOUNTER — OFFICE VISIT (OUTPATIENT)
Dept: ENDOCRINOLOGY | Facility: CLINIC | Age: 78
End: 2023-02-06
Payer: MEDICARE

## 2023-02-06 VITALS
SYSTOLIC BLOOD PRESSURE: 138 MMHG | HEART RATE: 74 BPM | BODY MASS INDEX: 27.69 KG/M2 | DIASTOLIC BLOOD PRESSURE: 64 MMHG | WEIGHT: 166.2 LBS | OXYGEN SATURATION: 98 % | HEIGHT: 65 IN

## 2023-02-06 DIAGNOSIS — D64.9 ANEMIA, UNSPECIFIED TYPE: ICD-10-CM

## 2023-02-06 DIAGNOSIS — I10 BENIGN ESSENTIAL HYPERTENSION: ICD-10-CM

## 2023-02-06 DIAGNOSIS — E10.42 TYPE 1 DIABETES MELLITUS WITH DIABETIC POLYNEUROPATHY: Primary | ICD-10-CM

## 2023-02-06 DIAGNOSIS — E78.00 PURE HYPERCHOLESTEROLEMIA: ICD-10-CM

## 2023-02-06 DIAGNOSIS — E55.9 VITAMIN D DEFICIENCY: ICD-10-CM

## 2023-02-06 PROCEDURE — 36415 COLL VENOUS BLD VENIPUNCTURE: CPT | Performed by: INTERNAL MEDICINE

## 2023-02-06 PROCEDURE — 83036 HEMOGLOBIN GLYCOSYLATED A1C: CPT | Performed by: INTERNAL MEDICINE

## 2023-02-06 PROCEDURE — 3051F HG A1C>EQUAL 7.0%<8.0%: CPT | Performed by: INTERNAL MEDICINE

## 2023-02-06 PROCEDURE — 95251 CONT GLUC MNTR ANALYSIS I&R: CPT | Performed by: INTERNAL MEDICINE

## 2023-02-06 PROCEDURE — 99214 OFFICE O/P EST MOD 30 MIN: CPT | Performed by: INTERNAL MEDICINE

## 2023-02-06 NOTE — PROGRESS NOTES
Chinyere Olvera 77 y.o.  CC:Follow-up, Diabetes (Type I, eye exam June 2022), Hypertension, and Hyperlipidemia    [place]: Follow-up, Diabetes (Type I, eye exam June 2022), Hypertension, and Hyperlipidemia    Blood sugar and 90 day average sugar reviewed  Results for orders placed or performed in visit on 02/06/23   POC Glycosylated Hemoglobin (Hb A1C)    Specimen: Blood   Result Value Ref Range    Hemoglobin A1C 7.0 %    Lot Number [id number]     Expiration Date 11/7/2024    POC Glucose, Blood    Specimen: Blood   Result Value Ref Range    Glucose 124 70 - 130 mg/dL    Lot Number [id number]     Expiration Date 7/22/23      Blood sugars have been good overall  Average sugar is 130   bp is good  Doing well overall  Is utd with eye exam  No foot lesion   Ur alb neg  Is eating low fat diet, taking lipitor 10 mg daily     Allergies   Allergen Reactions   • Polymyxin B Other (See Comments)   • Polymyxin B-Trimethoprim Other (See Comments)   • Penicillins Rash       Current Outpatient Medications:   •  ACCU-CHEK GUIDE test strip, Test blood sugars QID, Disp: 400 each, Rfl: 3  •  amLODIPine (NORVASC) 5 MG tablet, TAKE 1 TABLET EVERY DAY, Disp: 90 tablet, Rfl: 1  •  aspirin 81 MG EC tablet, Take 81 mg by mouth Daily., Disp: , Rfl:   •  atorvastatin (LIPITOR) 10 MG tablet, TAKE 1 TABLET EVERY DAY, Disp: 90 tablet, Rfl: 1  •  Blood Glucose Monitoring Suppl (ACCU-CHEK GUIDE) w/Device kit, 1 Device Daily., Disp: 1 kit, Rfl: 0  •  buPROPion XL (WELLBUTRIN XL) 150 MG 24 hr tablet, , Disp: , Rfl:   •  Calcium Citrate-Vitamin D 250-200 MG-UNIT tablet, , Disp: , Rfl:   •  Continuous Blood Gluc Sensor (FreeStyle Kike 14 Day Sensor) misc, USE ON APPROPRIATE AREA AS DIRECTED AND CHANGE SENSOR EVERY 14 DAYS., Disp: 6 each, Rfl: 1  •  Ferrous Fumarate (IRON) 18 MG tablet controlled-release, Take  by mouth Every Other Day., Disp: , Rfl:   •  glucose blood (ONE TOUCH ULTRA TEST) test strip, Test blood sugars QID, Disp: 400 each, Rfl: 3  •   "insulin aspart (NovoLOG FlexPen) 100 UNIT/ML solution pen-injector sc pen, Inject 10 Units under the skin into the appropriate area as directed 3 (Three) Times a Day Before Meals. (Patient taking differently: Inject 5-8 Units under the skin into the appropriate area as directed 3 (Three) Times a Day Before Meals.), Disp: 30 mL, Rfl: 1  •  Insulin Glargine (Lantus SoloStar) 100 UNIT/ML injection pen, Inject 30 Units under the skin into the appropriate area as directed Every Night. (Patient taking differently: Inject 26 Units under the skin into the appropriate area as directed Every Night.), Disp: 30 mL, Rfl: 1  •  Insulin Pen Needle (B-D ULTRAFINE III SHORT PEN) 31G X 8 MM misc, USE FOUR TIMES DAILY TO INJECT INSULIN, Disp: 400 each, Rfl: 3  •  Insulin Syringe-Needle U-100 31G X 5/16\" 0.5 ML misc, , Disp: , Rfl:   •  lansoprazole (PREVACID) 30 MG capsule, Take  by mouth., Disp: , Rfl:   •  losartan-hydrochlorothiazide (HYZAAR) 100-12.5 MG per tablet, TAKE 1 TABLET EVERY DAY, Disp: 90 tablet, Rfl: 1  •  metoprolol tartrate (LOPRESSOR) 25 MG tablet, TAKE 1/2 TABLET TWICE DAILY, Disp: 90 tablet, Rfl: 1  •  Multiple Vitamins-Minerals (MULTIVITAMIN ADULTS 50+) tablet, , Disp: , Rfl:   •  Omega-3 Fatty Acids (FISH OIL) 1000 MG capsule capsule, Take  by mouth daily., Disp: , Rfl:   •  potassium chloride (K-DUR,KLOR-CON) 10 MEQ CR tablet, TAKE 1 TABLET TWICE DAILY WITH MEALS, Disp: 180 tablet, Rfl: 0  •  PRODIGY TWIST TOP LANCETS 28G misc, Use 4 per day to test blood sugars, Disp: 400 each, Rfl: 3  •  zinc gluconate 50 MG tablet, zinc gluconate 50 mg tablet  Daily, Disp: , Rfl:   Patient Active Problem List    Diagnosis    • Mixed anxiety and depressive disorder [F41.8]    • Abdominal bloating [R14.0]    • Preoperative evaluation to rule out surgical contraindication [Z01.818]    • Obstructive sleep apnea syndrome [G47.33]    • Peripheral neuropathy, idiopathic [G60.9]    • Stress fracture of foot [M84.376A]    • Foot pain " [M79.673]    • Pain in left foot [M79.672]    • Moderate nonproliferative diabetic retinopathy associated with type 2 diabetes mellitus (HCC) [E11.3399]    • Abnormal liver function tests [R79.89]    • Anemia [D64.9]    • Benign essential hypertension [I10]    • 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 (HCC) [E10.49]    • Gastroesophageal reflux disease [K21.9]    • Hyperlipidemia [E78.5]    • Hypokalemia [E87.6]    • Calculus of kidney [N20.0]    • Cyst of ovary [N83.209]    • Renal insufficiency [N28.9]    • Solitary pulmonary nodule [R91.1]    • Vitamin D deficiency [E55.9]    • Abnormal weight loss [R63.4]      Review of Systems   Constitutional: Negative for activity change, appetite change and unexpected weight change.   HENT: Negative for congestion and rhinorrhea.    Eyes: Negative for visual disturbance.   Respiratory: Negative for cough and shortness of breath.    Cardiovascular: Negative for palpitations and leg swelling.   Gastrointestinal: Negative for constipation, diarrhea and nausea.   Genitourinary: Negative for hematuria.   Musculoskeletal: Negative for arthralgias, back pain, gait problem, joint swelling and myalgias.   Skin: Negative for color change, rash and wound.   Allergic/Immunologic: Negative for environmental allergies, food allergies and immunocompromised state.   Neurological: Negative for dizziness, weakness and light-headedness.   Psychiatric/Behavioral: Negative for confusion, decreased concentration, dysphoric mood and sleep disturbance. The patient is not nervous/anxious.      Social History     Socioeconomic History   • Marital status:    Tobacco Use   • Smoking status: Former   • Smokeless tobacco: Never   Vaping Use   • Vaping Use: Never used   Substance and Sexual Activity   • Alcohol use: Yes     Alcohol/week: 3.0 standard drinks     Types: 3 Glasses of wine per week     Comment: social   • Drug use: No   • Sexual activity: Defer     Family History  "  Problem Relation Age of Onset   • Diabetes Father    • Diabetes Maternal Grandfather    • Coronary artery disease Other    • Anemia Maternal Aunt      /64   Pulse 74   Ht 165.1 cm (65\")   Wt 75.4 kg (166 lb 3.2 oz)   SpO2 98%   BMI 27.66 kg/m²   Physical Exam  Vitals and nursing note reviewed.   Constitutional:       Appearance: Normal appearance. She is well-developed.   HENT:      Head: Normocephalic and atraumatic.   Eyes:      General: Lids are normal.      Extraocular Movements: Extraocular movements intact.      Conjunctiva/sclera: Conjunctivae normal.      Pupils: Pupils are equal, round, and reactive to light.   Neck:      Thyroid: No thyroid mass or thyromegaly.      Vascular: No carotid bruit.      Trachea: Trachea normal. No tracheal deviation.   Cardiovascular:      Rate and Rhythm: Normal rate and regular rhythm.      Heart sounds: Normal heart sounds. No murmur heard.    No friction rub. No gallop.   Pulmonary:      Effort: Pulmonary effort is normal. No respiratory distress.      Breath sounds: Normal breath sounds. No wheezing.   Musculoskeletal:         General: No deformity. Normal range of motion.      Cervical back: Normal range of motion and neck supple.   Lymphadenopathy:      Cervical: No cervical adenopathy.   Skin:     General: Skin is warm and dry.      Findings: No erythema or rash.      Nails: There is no clubbing.      Comments: Bilateral hammertoes, callus    Neurological:      General: No focal deficit present.      Mental Status: She is alert and oriented to person, place, and time.      Cranial Nerves: No cranial nerve deficit.      Deep Tendon Reflexes: Reflexes are normal and symmetric. Reflexes normal.   Psychiatric:         Mood and Affect: Mood normal.         Speech: Speech normal.         Behavior: Behavior normal.         Thought Content: Thought content normal.         Judgment: Judgment normal.       Results for orders placed or performed in visit on 02/06/23 "   POC Glycosylated Hemoglobin (Hb A1C)    Specimen: Blood   Result Value Ref Range    Hemoglobin A1C 7.0 %    Lot Number [id number]     Expiration Date 11/7/2024    POC Glucose, Blood    Specimen: Blood   Result Value Ref Range    Glucose 124 70 - 130 mg/dL    Lot Number [id number]     Expiration Date 7/22/23      Diagnoses and all orders for this visit:    1. 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 (HCC) (Primary)  Assessment & Plan:  Doing well overall  No foot lesion   Ur alb due   Sugar and 90 day average sugar reviewed  Results for orders placed or performed in visit on 11/01/22   POC Glycosylated Hemoglobin (Hb A1C)    Specimen: Blood   Result Value Ref Range    Hemoglobin A1C 6.3 %    Lot Number [id number]     Expiration Date 07/04/2024    POC Glucose, Blood    Specimen: Blood   Result Value Ref Range    Glucose 134 (A) 70 - 130 mg/dL    Lot Number [id number]     Expiration Date 05/20/2023      Sensor downloaded with 63% of sugars at goal  11 days of sensor data discussed  Higher sugar during the day - assoc with food  Discussed carb counting and correction for high sugar  Low overnight sugar- discussed confirming with fsbs- pressure hypoglycemia discussed  Is utd with preventive care      Orders:  -     POC Glycosylated Hemoglobin (Hb A1C)  -     POC Glucose, Blood  -     Comprehensive Metabolic Panel; Future  -     Microalbumin / Creatinine Urine Ratio - Urine, Clean Catch; Future  -     Comprehensive Metabolic Panel  -     Microalbumin / Creatinine Urine Ratio - Urine, Clean Catch    2. Benign essential hypertension  Assessment & Plan:  bp is controlled   Continue monitoring and current medication   Low potassium, check cmp       3. Pure hypercholesterolemia  Assessment & Plan:  Is eating low fat diet, taking lipitor   Check flp     Orders:  -     Lipid Panel; Future  -     TSH; Future  -     TSH  -     Lipid Panel    4. Vitamin D deficiency  Assessment & Plan:  Continue supplement   Update  levels     Orders:  -     Vitamin D,25-Hydroxy; Future  -     Vitamin D,25-Hydroxy    5. Anemia, unspecified type  Assessment & Plan:  Update cbc, iron level     Orders:  -     Iron; Future  -     CBC Auto Differential; Future  -     CBC Auto Differential  -     Iron  Return in about 3 months (around 5/6/2023) for Recheck.    Maria Eugenia Lawrence MD  Signed Maria Eugenia Lawrence MD

## 2023-02-07 LAB
25(OH)D3+25(OH)D2 SERPL-MCNC: 47.4 NG/ML (ref 30–100)
ALBUMIN SERPL-MCNC: 4.7 G/DL (ref 3.5–5.2)
ALBUMIN/GLOB SERPL: 3.4 G/DL
ALP SERPL-CCNC: 91 U/L (ref 39–117)
ALT SERPL-CCNC: 24 U/L (ref 1–33)
AST SERPL-CCNC: 24 U/L (ref 1–32)
BASOPHILS # BLD AUTO: 0.09 10*3/MM3 (ref 0–0.2)
BASOPHILS NFR BLD AUTO: 1.5 % (ref 0–1.5)
BILIRUB SERPL-MCNC: 0.5 MG/DL (ref 0–1.2)
BUN SERPL-MCNC: 29 MG/DL (ref 8–23)
BUN/CREAT SERPL: 27.4 (ref 7–25)
CALCIUM SERPL-MCNC: 9.9 MG/DL (ref 8.6–10.5)
CHLORIDE SERPL-SCNC: 102 MMOL/L (ref 98–107)
CHOLEST SERPL-MCNC: 150 MG/DL (ref 0–200)
CO2 SERPL-SCNC: 29 MMOL/L (ref 22–29)
CREAT SERPL-MCNC: 1.06 MG/DL (ref 0.57–1)
EGFRCR SERPLBLD CKD-EPI 2021: 54.2 ML/MIN/1.73
EOSINOPHIL # BLD AUTO: 0.15 10*3/MM3 (ref 0–0.4)
EOSINOPHIL NFR BLD AUTO: 2.5 % (ref 0.3–6.2)
ERYTHROCYTE [DISTWIDTH] IN BLOOD BY AUTOMATED COUNT: 12.3 % (ref 12.3–15.4)
EXPIRATION DATE: NORMAL
EXPIRATION DATE: NORMAL
GLOBULIN SER CALC-MCNC: 1.4 GM/DL
GLUCOSE BLDC GLUCOMTR-MCNC: 124 MG/DL (ref 70–130)
GLUCOSE SERPL-MCNC: 123 MG/DL (ref 65–99)
HBA1C MFR BLD: 7 %
HCT VFR BLD AUTO: 39.9 % (ref 34–46.6)
HDLC SERPL-MCNC: 54 MG/DL (ref 40–60)
HGB BLD-MCNC: 13.2 G/DL (ref 12–15.9)
IMM GRANULOCYTES # BLD AUTO: 0.03 10*3/MM3 (ref 0–0.05)
IMM GRANULOCYTES NFR BLD AUTO: 0.5 % (ref 0–0.5)
IRON SERPL-MCNC: 114 MCG/DL (ref 37–145)
LDLC SERPL CALC-MCNC: 76 MG/DL (ref 0–100)
LYMPHOCYTES # BLD AUTO: 1.4 10*3/MM3 (ref 0.7–3.1)
LYMPHOCYTES NFR BLD AUTO: 23 % (ref 19.6–45.3)
Lab: NORMAL
Lab: NORMAL
MCH RBC QN AUTO: 30.8 PG (ref 26.6–33)
MCHC RBC AUTO-ENTMCNC: 33.1 G/DL (ref 31.5–35.7)
MCV RBC AUTO: 93.2 FL (ref 79–97)
MONOCYTES # BLD AUTO: 0.72 10*3/MM3 (ref 0.1–0.9)
MONOCYTES NFR BLD AUTO: 11.8 % (ref 5–12)
NEUTROPHILS # BLD AUTO: 3.69 10*3/MM3 (ref 1.7–7)
NEUTROPHILS NFR BLD AUTO: 60.7 % (ref 42.7–76)
NRBC BLD AUTO-RTO: 0 /100 WBC (ref 0–0.2)
PLATELET # BLD AUTO: 268 10*3/MM3 (ref 140–450)
POTASSIUM SERPL-SCNC: 5.5 MMOL/L (ref 3.5–5.2)
PROT SERPL-MCNC: 6.1 G/DL (ref 6–8.5)
RBC # BLD AUTO: 4.28 10*6/MM3 (ref 3.77–5.28)
SODIUM SERPL-SCNC: 139 MMOL/L (ref 136–145)
TRIGL SERPL-MCNC: 109 MG/DL (ref 0–150)
TSH SERPL DL<=0.005 MIU/L-ACNC: 1.75 UIU/ML (ref 0.27–4.2)
VLDLC SERPL CALC-MCNC: 20 MG/DL (ref 5–40)
WBC # BLD AUTO: 6.08 10*3/MM3 (ref 3.4–10.8)

## 2023-02-07 NOTE — ASSESSMENT & PLAN NOTE
Doing well overall  No foot lesion   Ur alb due   Sugar and 90 day average sugar reviewed  Results for orders placed or performed in visit on 11/01/22   POC Glycosylated Hemoglobin (Hb A1C)    Specimen: Blood   Result Value Ref Range    Hemoglobin A1C 6.3 %    Lot Number [id number]     Expiration Date 07/04/2024    POC Glucose, Blood    Specimen: Blood   Result Value Ref Range    Glucose 134 (A) 70 - 130 mg/dL    Lot Number [id number]     Expiration Date 05/20/2023      Sensor downloaded with 63% of sugars at goal  11 days of sensor data discussed  Higher sugar during the day - assoc with food  Discussed carb counting and correction for high sugar  Low overnight sugar- discussed confirming with fsbs- pressure hypoglycemia discussed  Is utd with preventive care

## 2023-02-08 ENCOUNTER — TELEPHONE (OUTPATIENT)
Dept: ENDOCRINOLOGY | Facility: CLINIC | Age: 78
End: 2023-02-08
Payer: MEDICARE

## 2023-02-08 LAB
CREAT UR-MCNC: NORMAL MG/DL
MICROALBUMIN UR-MCNC: NORMAL
SPECIMEN STATUS: NORMAL

## 2023-02-08 NOTE — TELEPHONE ENCOUNTER
"Patient returned call. Read her message from Dr. Lawrence, \"Please let her know that her potassium level was elevated and she should stop her potassium supplement.Thanks,   Adrián Lawrence MD\"     Patient verbalized understanding.  "

## 2023-02-08 NOTE — TELEPHONE ENCOUNTER
Please let her know that her potassium level was elevated and she should stop her potassium supplement  Thanks,   Adrián Lawrence MD

## 2023-04-06 RX ORDER — LOSARTAN POTASSIUM AND HYDROCHLOROTHIAZIDE 12.5; 1 MG/1; MG/1
TABLET ORAL
Qty: 90 TABLET | Refills: 0 | Status: SHIPPED | OUTPATIENT
Start: 2023-04-06

## 2023-05-08 RX ORDER — PEN NEEDLE, DIABETIC 31 GX5/16"
NEEDLE, DISPOSABLE MISCELLANEOUS
Qty: 400 EACH | Refills: 3 | Status: SHIPPED | OUTPATIENT
Start: 2023-05-08

## 2023-08-03 ENCOUNTER — TRANSCRIBE ORDERS (OUTPATIENT)
Dept: ADMINISTRATIVE | Facility: HOSPITAL | Age: 78
End: 2023-08-03
Payer: MEDICARE

## 2023-08-03 DIAGNOSIS — Z12.31 ENCOUNTER FOR SCREENING MAMMOGRAM FOR BREAST CANCER: Primary | ICD-10-CM

## 2023-08-10 ENCOUNTER — APPOINTMENT (OUTPATIENT)
Dept: OTHER | Facility: HOSPITAL | Age: 78
End: 2023-08-10
Payer: MEDICARE

## 2023-08-10 ENCOUNTER — HOSPITAL ENCOUNTER (OUTPATIENT)
Dept: MAMMOGRAPHY | Facility: HOSPITAL | Age: 78
Discharge: HOME | End: 2023-08-10
Payer: MEDICARE

## 2023-08-10 DIAGNOSIS — Z12.31 ENCOUNTER FOR SCREENING MAMMOGRAM FOR BREAST CANCER: ICD-10-CM

## 2023-08-10 PROCEDURE — 77063 BREAST TOMOSYNTHESIS BI: CPT

## 2023-08-10 PROCEDURE — 77067 SCR MAMMO BI INCL CAD: CPT

## 2023-09-05 ENCOUNTER — OFFICE VISIT (OUTPATIENT)
Dept: ENDOCRINOLOGY | Facility: CLINIC | Age: 78
End: 2023-09-05
Payer: MEDICARE

## 2023-09-05 VITALS
DIASTOLIC BLOOD PRESSURE: 72 MMHG | WEIGHT: 169 LBS | BODY MASS INDEX: 28.16 KG/M2 | OXYGEN SATURATION: 98 % | SYSTOLIC BLOOD PRESSURE: 138 MMHG | HEART RATE: 70 BPM | HEIGHT: 65 IN

## 2023-09-05 DIAGNOSIS — I10 BENIGN ESSENTIAL HYPERTENSION: ICD-10-CM

## 2023-09-05 DIAGNOSIS — E78.00 PURE HYPERCHOLESTEROLEMIA: ICD-10-CM

## 2023-09-05 DIAGNOSIS — E10.42 TYPE 1 DIABETES MELLITUS WITH DIABETIC POLYNEUROPATHY: Primary | ICD-10-CM

## 2023-09-05 PROBLEM — N81.10 PELVIC ORGAN PROLAPSE QUANTIFICATION STAGE 4 CYSTOCELE: Status: ACTIVE | Noted: 2023-09-05

## 2023-09-05 LAB
ALBUMIN SERPL-MCNC: 5 G/DL (ref 3.5–5.2)
ALBUMIN/GLOB SERPL: 2.9 G/DL
ALP SERPL-CCNC: 79 U/L (ref 39–117)
ALT SERPL-CCNC: 32 U/L (ref 1–33)
AST SERPL-CCNC: 21 U/L (ref 1–32)
BILIRUB SERPL-MCNC: 0.4 MG/DL (ref 0–1.2)
BUN SERPL-MCNC: 28 MG/DL (ref 8–23)
BUN/CREAT SERPL: 26.2 (ref 7–25)
CALCIUM SERPL-MCNC: 10.1 MG/DL (ref 8.6–10.5)
CHLORIDE SERPL-SCNC: 100 MMOL/L (ref 98–107)
CHOLEST SERPL-MCNC: 184 MG/DL (ref 0–200)
CO2 SERPL-SCNC: 28.3 MMOL/L (ref 22–29)
CREAT SERPL-MCNC: 1.07 MG/DL (ref 0.57–1)
EGFRCR SERPLBLD CKD-EPI 2021: 53.3 ML/MIN/1.73
EXPIRATION DATE: NORMAL
EXPIRATION DATE: NORMAL
GLOBULIN SER CALC-MCNC: 1.7 GM/DL
GLUCOSE BLDC GLUCOMTR-MCNC: 73 MG/DL (ref 70–130)
GLUCOSE SERPL-MCNC: 99 MG/DL (ref 65–99)
HBA1C MFR BLD: 6.7 %
HDLC SERPL-MCNC: 58 MG/DL (ref 40–60)
LDLC SERPL CALC-MCNC: 100 MG/DL (ref 0–100)
Lab: NORMAL
Lab: NORMAL
POTASSIUM SERPL-SCNC: 4.3 MMOL/L (ref 3.5–5.2)
PROT SERPL-MCNC: 6.7 G/DL (ref 6–8.5)
SODIUM SERPL-SCNC: 139 MMOL/L (ref 136–145)
TRIGL SERPL-MCNC: 148 MG/DL (ref 0–150)
TSH SERPL DL<=0.005 MIU/L-ACNC: 2.2 UIU/ML (ref 0.27–4.2)
VLDLC SERPL CALC-MCNC: 26 MG/DL (ref 5–40)

## 2023-09-05 RX ORDER — LOSARTAN POTASSIUM AND HYDROCHLOROTHIAZIDE 12.5; 1 MG/1; MG/1
TABLET ORAL
Qty: 90 TABLET | Refills: 0 | Status: SHIPPED | OUTPATIENT
Start: 2023-09-05

## 2023-09-05 NOTE — TELEPHONE ENCOUNTER
Rx Refill Note  Requested Prescriptions     Pending Prescriptions Disp Refills    losartan-hydrochlorothiazide (HYZAAR) 100-12.5 MG per tablet [Pharmacy Med Name: LOSARTAN POTASSIUM/HYDROCHLOROTHIAZIDE 100-12.5 MG Tablet] 90 tablet 0     Sig: TAKE 1 TABLET EVERY DAY    Next Scheduled appt 12/14/2023 with Dr Lawrence  Last office visit with prescribing clinician: Visit date not found   Last telemedicine visit with prescribing clinician: Visit date not found   Next office visit with prescribing clinician: Visit date not found                         Would you like a call back once the refill request has been completed: [] Yes [] No    If the office needs to give you a call back, can they leave a voicemail: [] Yes [] No    Jodie Mata MA  09/05/23, 14:12 EDT

## 2023-09-05 NOTE — ASSESSMENT & PLAN NOTE
Blood sugar and 90 day average sugar reviewed  Results for orders placed or performed in visit on 09/05/23   POC Glycosylated Hemoglobin (Hb A1C)    Specimen: Blood   Result Value Ref Range    Hemoglobin A1C 6.7 %    Lot Number [id number]     Expiration Date 05/07/2025    POC Glucose, Blood    Specimen: Blood   Result Value Ref Range    Glucose 73 70 - 130 mg/dL    Lot Number [id number]     Expiration Date 01/28/2024    Sensor data discussed - 11 days of sensor data reviewed with recommendations of change in lantus dosing, insulin prior to meals   Is utd with eye exam  Stable neuropathy with foot deformity and callus - monitor feet closely  Ur alb due NOV   F/u 3-4 months

## 2023-09-05 NOTE — PROGRESS NOTES
Chinyere Olvera 78 y.o.  CC:Follow-up, Diabetes (Type II, eye exam June 23 Dr Bowers/), Hypertension, and Hyperlipidemia    [place]: Follow-up, Diabetes (Type II, eye exam June 23 Dr Bowers/), Hypertension, and Hyperlipidemia    Interim back injury- doing PT  Blood sugar and 90 day average sugar reviewed  Results for orders placed or performed in visit on 09/05/23   POC Glycosylated Hemoglobin (Hb A1C)    Specimen: Blood   Result Value Ref Range    Hemoglobin A1C 6.7 %    Lot Number [id number]     Expiration Date 05/07/2025    POC Glucose, Blood    Specimen: Blood   Result Value Ref Range    Glucose 73 70 - 130 mg/dL    Lot Number [id number]     Expiration Date 01/28/2024      Average sugar is 140  Downloaded sensor and reviewed 11 days of sensor reviewed  Low sugar overnight- discussed reduction of lantus from 26 u to 22 u  Also some higher pp dinner sugars- discussed raising novolog   Based on tdd 1 unit should cover 10 carbs, correct 30 mg/dl sugar   Is on diet now and effect of diet discussed     Allergies   Allergen Reactions    Polymyxin B Other (See Comments)    Polymyxin B-Trimethoprim Other (See Comments)    Penicillins Rash       Current Outpatient Medications:     ACCU-CHEK GUIDE test strip, Test blood sugars QID, Disp: 400 each, Rfl: 3    amLODIPine (NORVASC) 5 MG tablet, TAKE 1 TABLET EVERY DAY, Disp: 90 tablet, Rfl: 1    aspirin 81 MG EC tablet, Take 81 mg by mouth Daily., Disp: , Rfl:     atorvastatin (LIPITOR) 10 MG tablet, TAKE 1 TABLET EVERY DAY, Disp: 90 tablet, Rfl: 1    Blood Glucose Monitoring Suppl (ACCU-CHEK GUIDE) w/Device kit, 1 Device Daily., Disp: 1 kit, Rfl: 0    buPROPion XL (WELLBUTRIN XL) 150 MG 24 hr tablet, , Disp: , Rfl:     Calcium Citrate-Vitamin D 250-200 MG-UNIT tablet, , Disp: , Rfl:     Continuous Blood Gluc Sensor (Magin[employer]Style Kike 14 Day Sensor) misc, USE ON APPROPRIATE AREA AS DIRECTED AND CHANGE SENSOR EVERY 14 DAYS., Disp: 6 each, Rfl: 1    Ferrous Fumarate (IRON)  "18 MG tablet controlled-release, Take  by mouth Every Other Day., Disp: , Rfl:     glucose blood (ONE TOUCH ULTRA TEST) test strip, Test blood sugars QID, Disp: 400 each, Rfl: 3    insulin aspart (NovoLOG FlexPen) 100 UNIT/ML solution pen-injector sc pen, Inject 10 Units under the skin into the appropriate area as directed 3 (Three) Times a Day Before Meals. (Patient taking differently: Inject 5-8 Units under the skin into the appropriate area as directed 3 (Three) Times a Day Before Meals.), Disp: 30 mL, Rfl: 1    Insulin Glargine (Lantus SoloStar) 100 UNIT/ML injection pen, Inject 30 Units under the skin into the appropriate area as directed Every Night. (Patient taking differently: Inject 28 Units under the skin into the appropriate area as directed Every Night.), Disp: 30 mL, Rfl: 1    Insulin Pen Needle (B-D ULTRAFINE III SHORT PEN) 31G X 8 MM misc, USE FOUR TIMES DAILY TO INJECT INSULIN, Disp: 400 each, Rfl: 3    Insulin Syringe-Needle U-100 31G X 5/16\" 0.5 ML misc, , Disp: , Rfl:     lansoprazole (PREVACID) 30 MG capsule, Take  by mouth., Disp: , Rfl:     losartan-hydrochlorothiazide (HYZAAR) 100-12.5 MG per tablet, TAKE 1 TABLET EVERY DAY, Disp: 90 tablet, Rfl: 0    metoprolol tartrate (LOPRESSOR) 25 MG tablet, TAKE 1/2 TABLET TWICE DAILY, Disp: 90 tablet, Rfl: 1    Multiple Vitamins-Minerals (MULTIVITAMIN ADULTS 50+) tablet, , Disp: , Rfl:     Omega-3 Fatty Acids (FISH OIL) 1000 MG capsule capsule, Take  by mouth daily., Disp: , Rfl:     PRODIGY TWIST TOP LANCETS 28G misc, Use 4 per day to test blood sugars, Disp: 400 each, Rfl: 3    zinc gluconate 50 MG tablet, zinc gluconate 50 mg tablet  Daily, Disp: , Rfl:   Patient Active Problem List    Diagnosis     Pelvic organ prolapse quantification stage 4 cystocele [N81.10]     Mixed anxiety and depressive disorder [F41.8]     Abdominal bloating [R14.0]     Preoperative evaluation to rule out surgical contraindication [Z01.818]     Obstructive sleep apnea " syndrome [G47.33]     Peripheral neuropathy, idiopathic [G60.9]     Stress fracture of foot [M84.376A]     Foot pain [M79.673]     Pain in left foot [M79.672]     Moderate nonproliferative diabetic retinopathy associated with type 2 diabetes mellitus [E11.3399]     Abnormal liver function tests [R79.89]     Anemia [D64.9]     Benign essential hypertension [I10]     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]     Gastroesophageal reflux disease [K21.9]     Hyperlipidemia [E78.5]     Hypokalemia [E87.6]     Calculus of kidney [N20.0]     Cyst of ovary [N83.209]     Renal insufficiency [N28.9]     Solitary pulmonary nodule [R91.1]     Vitamin D deficiency [E55.9]     Abnormal weight loss [R63.4]      Review of Systems   Constitutional:  Negative for activity change, appetite change and unexpected weight change.   HENT:  Negative for congestion and rhinorrhea.    Eyes:  Negative for visual disturbance.   Respiratory:  Negative for cough and shortness of breath.    Cardiovascular:  Negative for palpitations and leg swelling.   Gastrointestinal:  Negative for constipation, diarrhea and nausea.   Genitourinary:  Negative for hematuria.   Musculoskeletal:  Positive for arthralgias, back pain and gait problem. Negative for joint swelling and myalgias.   Skin:  Negative for color change, rash and wound.   Allergic/Immunologic: Negative for environmental allergies, food allergies and immunocompromised state.   Neurological:  Negative for dizziness, weakness and light-headedness.   Psychiatric/Behavioral:  Negative for confusion, decreased concentration, dysphoric mood and sleep disturbance. The patient is not nervous/anxious.    Social History     Socioeconomic History    Marital status:    Tobacco Use    Smoking status: Former    Smokeless tobacco: Never   Vaping Use    Vaping Use: Never used   Substance and Sexual Activity    Alcohol use: Yes     Alcohol/week: 3.0 standard drinks     Types: 3  "Glasses of wine per week     Comment: social    Drug use: No    Sexual activity: Defer     Family History   Problem Relation Age of Onset    Diabetes Father     Anemia Maternal Aunt     Diabetes Maternal Grandfather     Coronary artery disease Other     Breast cancer Neg Hx     Ovarian cancer Neg Hx      /72   Pulse 70   Ht 165.1 cm (65\")   Wt 76.7 kg (169 lb)   SpO2 98%   BMI 28.12 kg/m²   Physical Exam  Vitals and nursing note reviewed.   Constitutional:       Appearance: Normal appearance. She is well-developed.   HENT:      Head: Normocephalic and atraumatic.   Eyes:      General: Lids are normal.      Extraocular Movements: Extraocular movements intact.      Conjunctiva/sclera: Conjunctivae normal.      Pupils: Pupils are equal, round, and reactive to light.   Neck:      Thyroid: No thyroid mass or thyromegaly.      Vascular: No carotid bruit.      Trachea: Trachea normal. No tracheal deviation.   Cardiovascular:      Rate and Rhythm: Normal rate and regular rhythm.      Heart sounds: Normal heart sounds. No murmur heard.    No friction rub. No gallop.   Pulmonary:      Effort: Pulmonary effort is normal. No respiratory distress.      Breath sounds: Normal breath sounds. No wheezing.   Musculoskeletal:         General: No deformity. Normal range of motion.      Cervical back: Normal range of motion and neck supple.      Comments: Bilateral hammer toes with callus    Lymphadenopathy:      Cervical: No cervical adenopathy.   Skin:     General: Skin is warm and dry.      Findings: No erythema or rash.      Nails: There is no clubbing.   Neurological:      Mental Status: She is alert and oriented to person, place, and time.      Cranial Nerves: No cranial nerve deficit.      Sensory: Sensory deficit present.      Deep Tendon Reflexes: Reflexes are normal and symmetric. Reflexes normal.   Psychiatric:         Mood and Affect: Mood normal.         Speech: Speech normal.         Behavior: Behavior normal.   "       Thought Content: Thought content normal.         Judgment: Judgment normal.     Results for orders placed or performed in visit on 09/05/23   POC Glycosylated Hemoglobin (Hb A1C)    Specimen: Blood   Result Value Ref Range    Hemoglobin A1C 6.7 %    Lot Number [id number]     Expiration Date 05/07/2025    POC Glucose, Blood    Specimen: Blood   Result Value Ref Range    Glucose 73 70 - 130 mg/dL    Lot Number [id number]     Expiration Date 01/28/2024      Diagnoses and all orders for this visit:    1. 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 (Primary)  Assessment & Plan:  Blood sugar and 90 day average sugar reviewed  Results for orders placed or performed in visit on 09/05/23   POC Glycosylated Hemoglobin (Hb A1C)    Specimen: Blood   Result Value Ref Range    Hemoglobin A1C 6.7 %    Lot Number [id number]     Expiration Date 05/07/2025    POC Glucose, Blood    Specimen: Blood   Result Value Ref Range    Glucose 73 70 - 130 mg/dL    Lot Number [id number]     Expiration Date 01/28/2024    Sensor data discussed - 11 days of sensor data reviewed with recommendations of change in lantus dosing, insulin prior to meals   Is utd with eye exam  Stable neuropathy with foot deformity and callus - monitor feet closely  Ur alb due NOV   F/u 3-4 months     Orders:  -     POC Glycosylated Hemoglobin (Hb A1C)  -     POC Glucose, Blood  -     Comprehensive Metabolic Panel; Future  -     Comprehensive Metabolic Panel    2. Benign essential hypertension  Assessment & Plan:  BP is good- continue monitoring, norvasc       3. Pure hypercholesterolemia  Assessment & Plan:  Eating low fat diet and taking lipitor 10 mg daily   Check flp     Orders:  -     Lipid Panel; Future  -     TSH; Future  -     TSH  -     Lipid Panel    Return in about 3 months (around 12/5/2023) for Recheck.    Maria Eugenia Lawrence MD  Signed Maria Eugenia Lawrence MD

## 2023-10-18 RX ORDER — INSULIN GLARGINE 100 [IU]/ML
30 INJECTION, SOLUTION SUBCUTANEOUS NIGHTLY
Qty: 30 ML | Refills: 1 | Status: SHIPPED | OUTPATIENT
Start: 2023-10-18

## 2023-10-18 NOTE — TELEPHONE ENCOUNTER
Rx Refill Note  Requested Prescriptions     Pending Prescriptions Disp Refills    Lantus SoloStar 100 UNIT/ML injection pen [Pharmacy Med Name: LANTUS SOLOSTAR U-100  Solution Pen-injector] 30 mL 1     Sig: INJECT 30 UNITS UNDER THE SKIN INTO THE APPROPRIATE AREA AS DIRECTED EVERY NIGHT.    MDD 30 units  Last office visit with prescribing clinician: 9/5/2023   Last telemedicine visit with prescribing clinician: Visit date not found   Next office visit with prescribing clinician: 12/14/2023     Dx code E11.65                    Would you like a call back once the refill request has been completed: [] Yes [] No    If the office needs to give you a call back, can they leave a voicemail: [] Yes [] No    Ora Henriquez MA  10/18/23, 09:45 EDT

## 2023-12-06 RX ORDER — INSULIN ASPART 100 [IU]/ML
10 INJECTION, SOLUTION INTRAVENOUS; SUBCUTANEOUS
Qty: 30 ML | Refills: 1 | Status: SHIPPED | OUTPATIENT
Start: 2023-12-06

## 2023-12-06 NOTE — TELEPHONE ENCOUNTER
Rx Refill Note  Requested Prescriptions     Pending Prescriptions Disp Refills    NovoLOG FlexPen 100 UNIT/ML solution pen-injector sc pen [Pharmacy Med Name: NOVOLOG FLEXPEN 5X3ML 100 Solution Pen-injector] 30 mL 1     Sig: INJECT 10 UNITS UNDER THE SKIN INTO THE APPROPRIATE AREA AS DIRECTED 3 (THREE) TIMES A DAY BEFORE MEALS.      Last office visit with prescribing clinician: 9/5/2023     Next office visit with prescribing clinician: 12/14/2023

## 2023-12-14 ENCOUNTER — OFFICE VISIT (OUTPATIENT)
Dept: ENDOCRINOLOGY | Facility: CLINIC | Age: 78
End: 2023-12-14
Payer: MEDICARE

## 2023-12-14 VITALS
BODY MASS INDEX: 26.92 KG/M2 | SYSTOLIC BLOOD PRESSURE: 132 MMHG | WEIGHT: 161.6 LBS | OXYGEN SATURATION: 98 % | HEART RATE: 72 BPM | DIASTOLIC BLOOD PRESSURE: 70 MMHG | HEIGHT: 65 IN

## 2023-12-14 DIAGNOSIS — E10.42 TYPE 1 DIABETES MELLITUS WITH DIABETIC POLYNEUROPATHY: Primary | ICD-10-CM

## 2023-12-14 DIAGNOSIS — E78.00 PURE HYPERCHOLESTEROLEMIA: ICD-10-CM

## 2023-12-14 LAB
EXPIRATION DATE: ABNORMAL
EXPIRATION DATE: ABNORMAL
GLUCOSE BLDC GLUCOMTR-MCNC: 148 MG/DL (ref 70–130)
HBA1C MFR BLD: 6.4 % (ref 4.5–5.7)
Lab: ABNORMAL
Lab: ABNORMAL

## 2023-12-14 NOTE — ASSESSMENT & PLAN NOTE
Blood sugar and 90 day average sugar reviewed  Results for orders placed or performed in visit on 12/14/23   POC Glycosylated Hemoglobin (Hb A1C)    Specimen: Blood   Result Value Ref Range    Hemoglobin A1C 6.4 (A) 4.5 - 5.7 %    Lot Number [id number]     Expiration Date 09/18/2025    POC Glucose, Blood    Specimen: Blood   Result Value Ref Range    Glucose 148 (A) 70 - 130 mg/dL    Lot Number [id number]     Expiration Date 06/30/2024      Average sugar is 135   Is utd with eye exam  Bilateral foot callus- foot care discussed   Ur alb neg   F/u 3-4 months

## 2023-12-14 NOTE — PROGRESS NOTES
Chinyere Olvera 78 y.o.  CC:Follow-up, Diabetes (Type II, eye exam 6/2023), Hypertension, and Hyperlipidemia    [place]: Follow-up, Diabetes (Type II, eye exam 6/2023), Hypertension, and Hyperlipidemia    Blood sugar and 90 day average sugar reviewed  Results for orders placed or performed in visit on 12/14/23   POC Glycosylated Hemoglobin (Hb A1C)    Specimen: Blood   Result Value Ref Range    Hemoglobin A1C 6.4 (A) 4.5 - 5.7 %    Lot Number [id number]     Expiration Date 09/18/2025    POC Glucose, Blood    Specimen: Blood   Result Value Ref Range    Glucose 148 (A) 70 - 130 mg/dL    Lot Number [id number]     Expiration Date 06/30/2024      Is taking 22 u lantus and then 6 u novolog before meals   Downloaded sensor - 11 days of sensor data reviewed  Is catching low sugars better- alerts now turned on   Higher sugars associated with recent travel  Discussed blood sugar dropping overnight and adjustment of long acting insulin discussed  Bp is good   Is eating low fat diet and taking lipitor 10 mg daily     Allergies   Allergen Reactions    Polymyxin B Other (See Comments)    Polymyxin B-Trimethoprim Other (See Comments)    Penicillins Rash       Current Outpatient Medications:     ACCU-CHEK GUIDE test strip, Test blood sugars QID, Disp: 400 each, Rfl: 3    amLODIPine (NORVASC) 5 MG tablet, TAKE 1 TABLET EVERY DAY, Disp: 90 tablet, Rfl: 1    aspirin 81 MG EC tablet, Take 81 mg by mouth Daily., Disp: , Rfl:     atorvastatin (LIPITOR) 10 MG tablet, TAKE 1 TABLET EVERY DAY, Disp: 90 tablet, Rfl: 1    Blood Glucose Monitoring Suppl (ACCU-CHEK GUIDE) w/Device kit, 1 Device Daily., Disp: 1 kit, Rfl: 0    buPROPion XL (WELLBUTRIN XL) 150 MG 24 hr tablet, , Disp: , Rfl:     Calcium Citrate-Vitamin D 250-200 MG-UNIT tablet, , Disp: , Rfl:     Continuous Blood Gluc Sensor ([employer]Style Kike 14 Day Sensor) misc, USE ON APPROPRIATE AREA AS DIRECTED AND CHANGE SENSOR EVERY 14 DAYS., Disp: 6 each, Rfl: 1    Ferrous Fumarate (IRON) 18 MG  "tablet controlled-release, Take  by mouth Every Other Day., Disp: , Rfl:     glucose blood (ONE TOUCH ULTRA TEST) test strip, Test blood sugars QID, Disp: 400 each, Rfl: 3    insulin aspart (NovoLOG FlexPen) 100 UNIT/ML solution pen-injector sc pen, INJECT 10 UNITS UNDER THE SKIN INTO THE APPROPRIATE AREA AS DIRECTED 3 (THREE) TIMES A DAY BEFORE MEALS., Disp: 30 mL, Rfl: 1    Insulin Pen Needle (B-D ULTRAFINE III SHORT PEN) 31G X 8 MM misc, USE FOUR TIMES DAILY TO INJECT INSULIN, Disp: 400 each, Rfl: 3    Insulin Syringe-Needle U-100 31G X 5/16\" 0.5 ML misc, , Disp: , Rfl:     lansoprazole (PREVACID) 30 MG capsule, Take  by mouth., Disp: , Rfl:     Lantus SoloStar 100 UNIT/ML injection pen, INJECT 30 UNITS UNDER THE SKIN INTO THE APPROPRIATE AREA AS DIRECTED EVERY NIGHT., Disp: 30 mL, Rfl: 1    losartan-hydrochlorothiazide (HYZAAR) 100-12.5 MG per tablet, TAKE 1 TABLET EVERY DAY, Disp: 90 tablet, Rfl: 0    metoprolol tartrate (LOPRESSOR) 25 MG tablet, TAKE 1/2 TABLET TWICE DAILY, Disp: 90 tablet, Rfl: 1    Multiple Vitamins-Minerals (MULTIVITAMIN ADULTS 50+) tablet, , Disp: , Rfl:     Omega-3 Fatty Acids (FISH OIL) 1000 MG capsule capsule, Take  by mouth daily., Disp: , Rfl:     PRODIGY TWIST TOP LANCETS 28G misc, Use 4 per day to test blood sugars, Disp: 400 each, Rfl: 3    zinc gluconate 50 MG tablet, zinc gluconate 50 mg tablet  Daily, Disp: , Rfl:   Patient Active Problem List    Diagnosis     Pelvic organ prolapse quantification stage 4 cystocele [N81.10]     Mixed anxiety and depressive disorder [F41.8]     Abdominal bloating [R14.0]     Preoperative evaluation to rule out surgical contraindication [Z01.818]     Obstructive sleep apnea syndrome [G47.33]     Peripheral neuropathy, idiopathic [G60.9]     Stress fracture of foot [M84.376A]     Foot pain [M79.673]     Pain in left foot [M79.672]     Moderate nonproliferative diabetic retinopathy associated with type 2 diabetes mellitus [E11.3399]     Abnormal " liver function tests [R79.89]     Anemia [D64.9]     Benign essential hypertension [I10]     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]     Gastroesophageal reflux disease [K21.9]     Hyperlipidemia [E78.5]     Hypokalemia [E87.6]     Calculus of kidney [N20.0]     Cyst of ovary [N83.209]     Renal insufficiency [N28.9]     Solitary pulmonary nodule [R91.1]     Vitamin D deficiency [E55.9]     Abnormal weight loss [R63.4]      Review of Systems   Constitutional:  Negative for activity change, appetite change and unexpected weight change.   HENT:  Negative for congestion and rhinorrhea.    Eyes:  Negative for visual disturbance.   Respiratory:  Negative for cough and shortness of breath.    Cardiovascular:  Negative for palpitations and leg swelling.   Gastrointestinal:  Negative for constipation, diarrhea and nausea.   Genitourinary:  Negative for hematuria.   Musculoskeletal:  Negative for arthralgias, back pain, gait problem, joint swelling and myalgias.   Skin:  Negative for color change, rash and wound.   Allergic/Immunologic: Negative for environmental allergies, food allergies and immunocompromised state.   Neurological:  Negative for dizziness, weakness and light-headedness.   Psychiatric/Behavioral:  Negative for confusion, decreased concentration, dysphoric mood and sleep disturbance. The patient is not nervous/anxious.      Social History     Socioeconomic History    Marital status:    Tobacco Use    Smoking status: Former    Smokeless tobacco: Never   Vaping Use    Vaping Use: Never used   Substance and Sexual Activity    Alcohol use: Yes     Alcohol/week: 3.0 standard drinks of alcohol     Types: 3 Glasses of wine per week     Comment: social    Drug use: No    Sexual activity: Defer     Family History   Problem Relation Age of Onset    Diabetes Father     Anemia Maternal Aunt     Diabetes Maternal Grandfather     Coronary artery disease Other     Breast cancer Neg Hx      "Ovarian cancer Neg Hx      /70   Pulse 72   Ht 165.1 cm (65\")   Wt 73.3 kg (161 lb 9.6 oz)   SpO2 98%   BMI 26.89 kg/m²   Physical Exam  Vitals and nursing note reviewed.   Constitutional:       Appearance: Normal appearance. She is well-developed.   HENT:      Head: Normocephalic and atraumatic.   Eyes:      General: Lids are normal.      Extraocular Movements: Extraocular movements intact.      Conjunctiva/sclera: Conjunctivae normal.      Pupils: Pupils are equal, round, and reactive to light.   Neck:      Thyroid: No thyroid mass or thyromegaly.      Vascular: No carotid bruit.      Trachea: Trachea normal. No tracheal deviation.   Cardiovascular:      Rate and Rhythm: Normal rate and regular rhythm.      Pulses: Normal pulses.      Heart sounds: Normal heart sounds. No murmur heard.     No friction rub. No gallop.   Pulmonary:      Effort: Pulmonary effort is normal. No respiratory distress.      Breath sounds: Normal breath sounds. No wheezing.   Musculoskeletal:         General: Deformity (bilateral hammer toe deformity) present. Normal range of motion.      Cervical back: Normal range of motion and neck supple.   Lymphadenopathy:      Cervical: No cervical adenopathy.   Skin:     General: Skin is warm and dry.      Findings: No erythema or rash.      Nails: There is no clubbing.   Neurological:      General: No focal deficit present.      Mental Status: She is alert and oriented to person, place, and time.      Cranial Nerves: No cranial nerve deficit.      Deep Tendon Reflexes: Reflexes are normal and symmetric. Reflexes normal.   Psychiatric:         Mood and Affect: Mood normal.         Speech: Speech normal.         Behavior: Behavior normal.         Thought Content: Thought content normal.         Judgment: Judgment normal.       Results for orders placed or performed in visit on 12/14/23   POC Glycosylated Hemoglobin (Hb A1C)    Specimen: Blood   Result Value Ref Range    Hemoglobin A1C 6.4 " (A) 4.5 - 5.7 %    Lot Number [id number]     Expiration Date 09/18/2025    POC Glucose, Blood    Specimen: Blood   Result Value Ref Range    Glucose 148 (A) 70 - 130 mg/dL    Lot Number [id number]     Expiration Date 06/30/2024      Diagnoses and all orders for this visit:    1. 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 (Primary)  Assessment & Plan:  Blood sugar and 90 day average sugar reviewed  Results for orders placed or performed in visit on 12/14/23   POC Glycosylated Hemoglobin (Hb A1C)    Specimen: Blood   Result Value Ref Range    Hemoglobin A1C 6.4 (A) 4.5 - 5.7 %    Lot Number [id number]     Expiration Date 09/18/2025    POC Glucose, Blood    Specimen: Blood   Result Value Ref Range    Glucose 148 (A) 70 - 130 mg/dL    Lot Number [id number]     Expiration Date 06/30/2024      Average sugar is 135   Is utd with eye exam  Bilateral foot callus- foot care discussed   Ur alb neg   F/u 3-4 months     Orders:  -     POC Glycosylated Hemoglobin (Hb A1C)  -     POC Glucose, Blood    2. Pure hypercholesterolemia  Assessment & Plan:   on 9/23 - consider increase in lipitor   Update SHAVON Lawrence MD  Signed Maria Eugenia Lawrence MD

## 2023-12-15 ENCOUNTER — LAB (OUTPATIENT)
Dept: ENDOCRINOLOGY | Facility: CLINIC | Age: 78
End: 2023-12-15
Payer: MEDICARE

## 2023-12-15 DIAGNOSIS — E10.42 TYPE 1 DIABETES MELLITUS WITH DIABETIC POLYNEUROPATHY: Primary | ICD-10-CM

## 2023-12-16 LAB
ALBUMIN/CREAT UR: <6 MG/G CREAT (ref 0–29)
CREAT UR-MCNC: 52.2 MG/DL
MICROALBUMIN UR-MCNC: <3 UG/ML

## 2024-01-15 ENCOUNTER — TELEPHONE (OUTPATIENT)
Dept: ENDOCRINOLOGY | Facility: CLINIC | Age: 79
End: 2024-01-15

## 2024-01-15 RX ORDER — ACYCLOVIR 400 MG/1
1 TABLET ORAL
Qty: 9 EACH | Refills: 1 | Status: SHIPPED | OUTPATIENT
Start: 2024-01-15

## 2024-01-16 ENCOUNTER — TELEPHONE (OUTPATIENT)
Dept: ENDOCRINOLOGY | Facility: CLINIC | Age: 79
End: 2024-01-16
Payer: MEDICARE

## 2024-01-16 NOTE — TELEPHONE ENCOUNTER
Forms were received from  [employer] requesting medical records and order forDexcom G7    Pt was contacted and she did verify she will be using this company in stead of [facility] due to insurance changes

## 2024-01-17 ENCOUNTER — PRIOR AUTHORIZATION (OUTPATIENT)
Dept: ENDOCRINOLOGY | Facility: CLINIC | Age: 79
End: 2024-01-17
Payer: MEDICARE

## 2024-01-17 NOTE — TELEPHONE ENCOUNTER
Chinyere Olvera (Key: [id number])  PA Case ID #: [id number]  Rx #: [id number]  Need Help? Call us at [phone]  Outcome  Approved today  PA Case: [id number], Status: Approved, Coverage Starts on: 1/1/2024 12:00:00 AM, Coverage Ends on: 1/16/2025 12:00:00 AM.  Authorization Expiration Date: 1/15/2025  Drug  Dexcom G7 Sensor  ePA cloud logo  Form  [employer] Electronic PA Form (2017 NCPDP)

## 2024-02-20 RX ORDER — INSULIN GLARGINE 100 [IU]/ML
30 INJECTION, SOLUTION SUBCUTANEOUS NIGHTLY
Qty: 30 ML | Refills: 1 | Status: SHIPPED | OUTPATIENT
Start: 2024-02-20

## 2024-02-20 NOTE — TELEPHONE ENCOUNTER
Rx Refill Note    Requested Prescriptions     Pending Prescriptions Disp Refills    Lantus SoloStar 100 UNIT/ML injection pen [Pharmacy Med Name: LANTUS SOLOSTAR U-100  Solution Pen-injector] 30 mL 1     Sig: INJECT 30 UNITS UNDER THE SKIN INTO THE APPROPRIATE AREA AS DIRECTED EVERY NIGHT.        Last office visit with prescribing clinician: 12/14/2023     Next office visit with prescribing clinician: 4/9/2024   {

## 2024-04-18 ENCOUNTER — OFFICE VISIT (OUTPATIENT)
Dept: ENDOCRINOLOGY | Facility: CLINIC | Age: 79
End: 2024-04-18
Payer: MEDICARE

## 2024-04-18 VITALS
DIASTOLIC BLOOD PRESSURE: 78 MMHG | BODY MASS INDEX: 26.79 KG/M2 | OXYGEN SATURATION: 98 % | HEIGHT: 65 IN | WEIGHT: 160.8 LBS | SYSTOLIC BLOOD PRESSURE: 128 MMHG | HEART RATE: 78 BPM

## 2024-04-18 DIAGNOSIS — I10 BENIGN ESSENTIAL HYPERTENSION: ICD-10-CM

## 2024-04-18 DIAGNOSIS — E55.9 VITAMIN D DEFICIENCY: ICD-10-CM

## 2024-04-18 DIAGNOSIS — E78.00 PURE HYPERCHOLESTEROLEMIA: ICD-10-CM

## 2024-04-18 DIAGNOSIS — E10.42 TYPE 1 DIABETES MELLITUS WITH DIABETIC POLYNEUROPATHY: Primary | ICD-10-CM

## 2024-04-18 LAB
EXPIRATION DATE: ABNORMAL
EXPIRATION DATE: NORMAL
GLUCOSE BLDC GLUCOMTR-MCNC: 96 MG/DL (ref 70–130)
HBA1C MFR BLD: 6.2 % (ref 4.5–5.7)
Lab: ABNORMAL
Lab: NORMAL

## 2024-04-18 PROCEDURE — 3074F SYST BP LT 130 MM HG: CPT | Performed by: INTERNAL MEDICINE

## 2024-04-18 PROCEDURE — 80053 COMPREHEN METABOLIC PANEL: CPT | Performed by: INTERNAL MEDICINE

## 2024-04-18 PROCEDURE — 1159F MED LIST DOCD IN RCRD: CPT | Performed by: INTERNAL MEDICINE

## 2024-04-18 PROCEDURE — 82306 VITAMIN D 25 HYDROXY: CPT | Performed by: INTERNAL MEDICINE

## 2024-04-18 PROCEDURE — 84443 ASSAY THYROID STIM HORMONE: CPT | Performed by: INTERNAL MEDICINE

## 2024-04-18 PROCEDURE — 80061 LIPID PANEL: CPT | Performed by: INTERNAL MEDICINE

## 2024-04-18 PROCEDURE — 99214 OFFICE O/P EST MOD 30 MIN: CPT | Performed by: INTERNAL MEDICINE

## 2024-04-18 PROCEDURE — 3044F HG A1C LEVEL LT 7.0%: CPT | Performed by: INTERNAL MEDICINE

## 2024-04-18 PROCEDURE — 1160F RVW MEDS BY RX/DR IN RCRD: CPT | Performed by: INTERNAL MEDICINE

## 2024-04-18 PROCEDURE — 83036 HEMOGLOBIN GLYCOSYLATED A1C: CPT | Performed by: INTERNAL MEDICINE

## 2024-04-18 PROCEDURE — 84439 ASSAY OF FREE THYROXINE: CPT | Performed by: INTERNAL MEDICINE

## 2024-04-18 PROCEDURE — 95251 CONT GLUC MNTR ANALYSIS I&R: CPT | Performed by: INTERNAL MEDICINE

## 2024-04-18 PROCEDURE — 3078F DIAST BP <80 MM HG: CPT | Performed by: INTERNAL MEDICINE

## 2024-04-18 NOTE — PROGRESS NOTES
Chinyere Olvera 79 y.o.  CC:Follow-up, Diabetes (TYpe I, eye exam 6/23), Hypertension, and Hyperlipidemia    [place]: Follow-up, Diabetes (TYpe I, eye exam 6/23), Hypertension, and Hyperlipidemia    Blood sugar and 90 day average sugar reviewed  Results for orders placed or performed in visit on 04/18/24   Comprehensive Metabolic Panel    Specimen: Blood   Result Value Ref Range    Glucose 74 65 - 99 mg/dL    BUN 21 8 - 23 mg/dL    Creatinine 1.10 (H) 0.57 - 1.00 mg/dL    Sodium 140 136 - 145 mmol/L    Potassium 3.6 3.5 - 5.2 mmol/L    Chloride 103 98 - 107 mmol/L    CO2 26.4 22.0 - 29.0 mmol/L    Calcium 9.7 8.6 - 10.5 mg/dL    Total Protein 6.7 6.0 - 8.5 g/dL    Albumin 4.6 3.5 - 5.2 g/dL    ALT (SGPT) 21 1 - 33 U/L    AST (SGOT) 26 1 - 32 U/L    Alkaline Phosphatase 71 39 - 117 U/L    Total Bilirubin 0.4 0.0 - 1.2 mg/dL    Globulin 2.1 gm/dL    A/G Ratio 2.2 g/dL    BUN/Creatinine Ratio 19.1 7.0 - 25.0    Anion Gap 10.6 5.0 - 15.0 mmol/L    eGFR 51.2 (L) >60.0 mL/min/1.73   Lipid Panel    Specimen: Blood   Result Value Ref Range    Total Cholesterol 144 0 - 200 mg/dL    Triglycerides 70 0 - 150 mg/dL    HDL Cholesterol 59 40 - 60 mg/dL    LDL Cholesterol  71 0 - 100 mg/dL    VLDL Cholesterol 14 5 - 40 mg/dL    LDL/HDL Ratio 1.20    TSH    Specimen: Blood   Result Value Ref Range    TSH 1.390 0.270 - 4.200 uIU/mL   T4, Free    Specimen: Blood   Result Value Ref Range    Free T4 1.17 0.93 - 1.70 ng/dL   Vitamin D,25-Hydroxy    Specimen: Arm, Left; Blood   Result Value Ref Range    25 Hydroxy, Vitamin D 52.2 30.0 - 100.0 ng/ml   POC Glycosylated Hemoglobin (Hb A1C)    Specimen: Blood   Result Value Ref Range    Hemoglobin A1C 6.2 (A) 4.5 - 5.7 %    Lot Number [id number]     Expiration Date 01/24/2026    POC Glucose, Blood    Specimen: Blood   Result Value Ref Range    Glucose 96 70 - 130 mg/dL    Lot Number [id number]     Expiration Date 10/20/2024      Average sugar is 120   Bp is good   Is eating low fat diet    Downloaded sensor and she is in range 75% of the time  7 days of sensor data discussed- higher pp breakfast and dinner sugars- discussed adjustment of insulin   Higher pp sugars  She is working on diet, has lost about 9 lbs  Is utd with eye exam  No foot ulcer- callus and hammertoes stable     Allergies   Allergen Reactions    Polymyxin B Other (See Comments)    Polymyxin B-Trimethoprim Other (See Comments)    Penicillins Rash       Current Outpatient Medications:     amLODIPine (NORVASC) 5 MG tablet, Take 1 tablet by mouth Daily., Disp: 90 tablet, Rfl: 1    atorvastatin (LIPITOR) 10 MG tablet, Take 1 tablet by mouth Daily., Disp: 90 tablet, Rfl: 1    losartan-hydrochlorothiazide (HYZAAR) 100-12.5 MG per tablet, Take 1 tablet by mouth Daily., Disp: 90 tablet, Rfl: 1    metoprolol tartrate (LOPRESSOR) 25 MG tablet, Take 0.5 tablets by mouth 2 (Two) Times a Day., Disp: 90 tablet, Rfl: 1    ACCU-CHEK GUIDE test strip, Test blood sugars QID, Disp: 400 each, Rfl: 3    aspirin 81 MG EC tablet, Take 81 mg by mouth Daily., Disp: , Rfl:     Blood Glucose Monitoring Suppl (ACCU-CHEK GUIDE) w/Device kit, 1 Device Daily., Disp: 1 kit, Rfl: 0    buPROPion XL (WELLBUTRIN XL) 150 MG 24 hr tablet, , Disp: , Rfl:     Calcium Citrate-Vitamin D 250-200 MG-UNIT tablet, , Disp: , Rfl:     Continuous Blood Gluc Sensor (Dexcom G7 Sensor) misc, Use 1 Units Every 10 (Ten) Days., Disp: 9 each, Rfl: 1    Ferrous Fumarate (IRON) 18 MG tablet controlled-release, Take  by mouth Every Other Day., Disp: , Rfl:     glucose blood (ONE TOUCH ULTRA TEST) test strip, Test blood sugars QID, Disp: 400 each, Rfl: 3    insulin aspart (NovoLOG FlexPen) 100 UNIT/ML solution pen-injector sc pen, INJECT 10 UNITS UNDER THE SKIN INTO THE APPROPRIATE AREA AS DIRECTED 3 (THREE) TIMES A DAY BEFORE MEALS., Disp: 30 mL, Rfl: 1    Insulin Pen Needle (B-D ULTRAFINE III SHORT PEN) 31G X 8 MM misc, USE FOUR TIMES DAILY TO INJECT INSULIN, Disp: 400 each, Rfl: 3     "Insulin Syringe-Needle U-100 31G X 5/16\" 0.5 ML misc, , Disp: , Rfl:     lansoprazole (PREVACID) 30 MG capsule, Take  by mouth., Disp: , Rfl:     Lantus SoloStar 100 UNIT/ML injection pen, INJECT 30 UNITS UNDER THE SKIN INTO THE APPROPRIATE AREA AS DIRECTED EVERY NIGHT., Disp: 30 mL, Rfl: 1    Multiple Vitamins-Minerals (MULTIVITAMIN ADULTS 50+) tablet, , Disp: , Rfl:     Omega-3 Fatty Acids (FISH OIL) 1000 MG capsule capsule, Take  by mouth daily., Disp: , Rfl:     PRODIGY TWIST TOP LANCETS 28G misc, Use 4 per day to test blood sugars, Disp: 400 each, Rfl: 3    zinc gluconate 50 MG tablet, zinc gluconate 50 mg tablet  Daily, Disp: , Rfl:     Patient Active Problem List    Diagnosis     Stage 3a chronic kidney disease [N18.31]     Malaise and fatigue [R53.81, R53.83]     Pelvic organ prolapse quantification stage 4 cystocele [N81.10]     Lumbar radiculopathy [M54.16]     Chronic constipation [K59.09]     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]     Low back pain [M54.50]     Recurrent major depression [F33.9]     Severe nonproliferative diabetic retinopathy [E11.3499]     Mixed anxiety and depressive disorder [F41.8]     Abdominal bloating [R14.0]     Preoperative evaluation to rule out surgical contraindication [Z01.818]     Obstructive sleep apnea syndrome [G47.33]     Peripheral neuropathy, idiopathic [G60.9]     Stress fracture of foot [M84.376A]     Foot pain [M79.673]     Pain in left foot [M79.672]     Moderate nonproliferative diabetic retinopathy associated with type 2 diabetes mellitus [E11.3399]     Abnormal liver function tests [R79.89]     Anemia [D64.9]     Benign essential hypertension [I10]     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]     Gastroesophageal reflux disease [K21.9]     Hyperlipidemia [E78.5]     Hypokalemia [E87.6]     Calculus of kidney [N20.0]     Cyst of ovary [N83.209]     Renal insufficiency [N28.9]     Solitary pulmonary nodule " "[R91.1]     Vitamin D deficiency [E55.9]     Abnormal weight loss [R63.4]      Review of Systems   Constitutional:  Negative for activity change, appetite change and unexpected weight change.   HENT:  Negative for congestion and rhinorrhea.    Eyes:  Negative for visual disturbance.   Respiratory:  Negative for cough and shortness of breath.    Cardiovascular:  Negative for palpitations and leg swelling.   Gastrointestinal:  Negative for constipation, diarrhea and nausea.   Genitourinary:  Negative for hematuria.   Musculoskeletal:  Negative for arthralgias, back pain, gait problem, joint swelling and myalgias.   Skin:  Negative for color change, rash and wound.   Allergic/Immunologic: Negative for environmental allergies, food allergies and immunocompromised state.   Neurological:  Negative for dizziness, weakness and light-headedness.   Psychiatric/Behavioral:  Negative for confusion, decreased concentration, dysphoric mood and sleep disturbance. The patient is not nervous/anxious.        Social History     Socioeconomic History    Marital status:    Tobacco Use    Smoking status: Former    Smokeless tobacco: Never   Vaping Use    Vaping status: Never Used   Substance and Sexual Activity    Alcohol use: Yes     Alcohol/week: 3.0 standard drinks of alcohol     Types: 3 Glasses of wine per week     Comment: social    Drug use: No    Sexual activity: Defer     Family History   Problem Relation Age of Onset    Diabetes Father     Anemia Maternal Aunt     Diabetes Maternal Grandfather     Coronary artery disease Other     Breast cancer Neg Hx     Ovarian cancer Neg Hx      /78   Pulse 78   Ht 165.1 cm (65\")   Wt 72.9 kg (160 lb 12.8 oz)   SpO2 98%   BMI 26.76 kg/m²     Physical Exam  Vitals and nursing note reviewed.   Constitutional:       Appearance: Normal appearance. She is well-developed.   HENT:      Head: Normocephalic and atraumatic.   Eyes:      General: Lids are normal.      " Conjunctiva/sclera: Conjunctivae normal.      Pupils: Pupils are equal, round, and reactive to light.   Neck:      Thyroid: No thyroid mass or thyromegaly.      Vascular: No carotid bruit.      Trachea: Trachea normal. No tracheal deviation.   Cardiovascular:      Rate and Rhythm: Normal rate and regular rhythm.      Pulses: Normal pulses.      Heart sounds: Normal heart sounds. No murmur heard.     No friction rub. No gallop.   Pulmonary:      Effort: Pulmonary effort is normal. No respiratory distress.      Breath sounds: Normal breath sounds. No wheezing.   Musculoskeletal:         General: No deformity. Normal range of motion.      Cervical back: Normal range of motion and neck supple.   Lymphadenopathy:      Cervical: No cervical adenopathy.   Skin:     General: Skin is warm and dry.      Findings: No erythema or rash.      Nails: There is no clubbing.   Neurological:      General: No focal deficit present.      Mental Status: She is alert and oriented to person, place, and time.      Cranial Nerves: No cranial nerve deficit.      Deep Tendon Reflexes: Reflexes are normal and symmetric. Reflexes normal.   Psychiatric:         Speech: Speech normal.         Behavior: Behavior normal.         Thought Content: Thought content normal.         Judgment: Judgment normal.       Results for orders placed or performed in visit on 04/18/24   Comprehensive Metabolic Panel    Specimen: Blood   Result Value Ref Range    Glucose 74 65 - 99 mg/dL    BUN 21 8 - 23 mg/dL    Creatinine 1.10 (H) 0.57 - 1.00 mg/dL    Sodium 140 136 - 145 mmol/L    Potassium 3.6 3.5 - 5.2 mmol/L    Chloride 103 98 - 107 mmol/L    CO2 26.4 22.0 - 29.0 mmol/L    Calcium 9.7 8.6 - 10.5 mg/dL    Total Protein 6.7 6.0 - 8.5 g/dL    Albumin 4.6 3.5 - 5.2 g/dL    ALT (SGPT) 21 1 - 33 U/L    AST (SGOT) 26 1 - 32 U/L    Alkaline Phosphatase 71 39 - 117 U/L    Total Bilirubin 0.4 0.0 - 1.2 mg/dL    Globulin 2.1 gm/dL    A/G Ratio 2.2 g/dL    BUN/Creatinine  Ratio 19.1 7.0 - 25.0    Anion Gap 10.6 5.0 - 15.0 mmol/L    eGFR 51.2 (L) >60.0 mL/min/1.73   Lipid Panel    Specimen: Blood   Result Value Ref Range    Total Cholesterol 144 0 - 200 mg/dL    Triglycerides 70 0 - 150 mg/dL    HDL Cholesterol 59 40 - 60 mg/dL    LDL Cholesterol  71 0 - 100 mg/dL    VLDL Cholesterol 14 5 - 40 mg/dL    LDL/HDL Ratio 1.20    TSH    Specimen: Blood   Result Value Ref Range    TSH 1.390 0.270 - 4.200 uIU/mL   T4, Free    Specimen: Blood   Result Value Ref Range    Free T4 1.17 0.93 - 1.70 ng/dL   Vitamin D,25-Hydroxy    Specimen: Arm, Left; Blood   Result Value Ref Range    25 Hydroxy, Vitamin D 52.2 30.0 - 100.0 ng/ml   POC Glycosylated Hemoglobin (Hb A1C)    Specimen: Blood   Result Value Ref Range    Hemoglobin A1C 6.2 (A) 4.5 - 5.7 %    Lot Number [id number]     Expiration Date 01/24/2026    POC Glucose, Blood    Specimen: Blood   Result Value Ref Range    Glucose 96 70 - 130 mg/dL    Lot Number [id number]     Expiration Date 10/20/2024      Diagnoses and all orders for this visit:    1. 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 (Primary)  Assessment & Plan:  Blood sugar and 90 day average sugar reviewed  Results for orders placed or performed in visit on 04/18/24   Comprehensive Metabolic Panel    Specimen: Blood   Result Value Ref Range    Glucose 74 65 - 99 mg/dL    BUN 21 8 - 23 mg/dL    Creatinine 1.10 (H) 0.57 - 1.00 mg/dL    Sodium 140 136 - 145 mmol/L    Potassium 3.6 3.5 - 5.2 mmol/L    Chloride 103 98 - 107 mmol/L    CO2 26.4 22.0 - 29.0 mmol/L    Calcium 9.7 8.6 - 10.5 mg/dL    Total Protein 6.7 6.0 - 8.5 g/dL    Albumin 4.6 3.5 - 5.2 g/dL    ALT (SGPT) 21 1 - 33 U/L    AST (SGOT) 26 1 - 32 U/L    Alkaline Phosphatase 71 39 - 117 U/L    Total Bilirubin 0.4 0.0 - 1.2 mg/dL    Globulin 2.1 gm/dL    A/G Ratio 2.2 g/dL    BUN/Creatinine Ratio 19.1 7.0 - 25.0    Anion Gap 10.6 5.0 - 15.0 mmol/L    eGFR 51.2 (L) >60.0 mL/min/1.73   Lipid Panel    Specimen: Blood    Result Value Ref Range    Total Cholesterol 144 0 - 200 mg/dL    Triglycerides 70 0 - 150 mg/dL    HDL Cholesterol 59 40 - 60 mg/dL    LDL Cholesterol  71 0 - 100 mg/dL    VLDL Cholesterol 14 5 - 40 mg/dL    LDL/HDL Ratio 1.20    TSH    Specimen: Blood   Result Value Ref Range    TSH 1.390 0.270 - 4.200 uIU/mL   T4, Free    Specimen: Blood   Result Value Ref Range    Free T4 1.17 0.93 - 1.70 ng/dL   Vitamin D,25-Hydroxy    Specimen: Arm, Left; Blood   Result Value Ref Range    25 Hydroxy, Vitamin D 52.2 30.0 - 100.0 ng/ml   POC Glycosylated Hemoglobin (Hb A1C)    Specimen: Blood   Result Value Ref Range    Hemoglobin A1C 6.2 (A) 4.5 - 5.7 %    Lot Number [id number]     Expiration Date 01/24/2026    POC Glucose, Blood    Specimen: Blood   Result Value Ref Range    Glucose 96 70 - 130 mg/dL    Lot Number [id number]     Expiration Date 10/20/2024      Average sugar is good  Downloaded sensor and reviewed 7 days of sensor data  Continue adjustment novolog for higher pp sugars- she is working on her diet and adjustment of lantus discussed for weight loss   Is utd with eye exam  No foot ulceration- hammertoes and callus note  Ur alb due  F/u 3-4 months     Orders:  -     POC Glycosylated Hemoglobin (Hb A1C)  -     POC Glucose, Blood  -     Comprehensive Metabolic Panel; Future  -     Comprehensive Metabolic Panel    2. Pure hypercholesterolemia  Assessment & Plan:  Check flp - taking lipitor 10 mg daily     Orders:  -     Lipid Panel; Future  -     TSH; Future  -     T4, Free; Future  -     Lipid Panel  -     TSH  -     T4, Free    3. Benign essential hypertension  Assessment & Plan:  BP is well controlled  Continue norvasc, losartan hctz and metoprolol       4. Vitamin D deficiency  Assessment & Plan:  Continue supplement, update levels     Orders:  -     Vitamin D,25-Hydroxy; Future  -     Vitamin D,25-Hydroxy    Other orders  -     amLODIPine (NORVASC) 5 MG tablet; Take 1 tablet by mouth Daily.  Dispense: 90 tablet;  Refill: 1  -     atorvastatin (LIPITOR) 10 MG tablet; Take 1 tablet by mouth Daily.  Dispense: 90 tablet; Refill: 1  -     losartan-hydrochlorothiazide (HYZAAR) 100-12.5 MG per tablet; Take 1 tablet by mouth Daily.  Dispense: 90 tablet; Refill: 1  -     metoprolol tartrate (LOPRESSOR) 25 MG tablet; Take 0.5 tablets by mouth 2 (Two) Times a Day.  Dispense: 90 tablet; Refill: 1      Electronically signed by: Maria Eugenia Lawrence MD

## 2024-04-19 PROBLEM — F33.9 RECURRENT MAJOR DEPRESSION: Status: ACTIVE | Noted: 2023-07-12

## 2024-04-19 PROBLEM — M54.16 LUMBAR RADICULOPATHY: Status: ACTIVE | Noted: 2023-08-18

## 2024-04-19 PROBLEM — K59.09 CHRONIC CONSTIPATION: Status: ACTIVE | Noted: 2023-07-12

## 2024-04-19 PROBLEM — M54.50 LOW BACK PAIN: Status: ACTIVE | Noted: 2023-07-12

## 2024-04-19 PROBLEM — R53.81 MALAISE AND FATIGUE: Status: ACTIVE | Noted: 2023-09-18

## 2024-04-19 PROBLEM — N18.31 STAGE 3A CHRONIC KIDNEY DISEASE: Status: ACTIVE | Noted: 2024-04-19

## 2024-04-19 PROBLEM — E11.22 CHRONIC KIDNEY DISEASE DUE TO TYPE 2 DIABETES MELLITUS: Status: ACTIVE | Noted: 2023-07-12

## 2024-04-19 PROBLEM — R53.83 MALAISE AND FATIGUE: Status: ACTIVE | Noted: 2023-09-18

## 2024-04-19 PROBLEM — E11.3499 SEVERE NONPROLIFERATIVE DIABETIC RETINOPATHY: Status: ACTIVE | Noted: 2023-03-15

## 2024-04-19 LAB
25(OH)D3 SERPL-MCNC: 52.2 NG/ML (ref 30–100)
ALBUMIN SERPL-MCNC: 4.6 G/DL (ref 3.5–5.2)
ALBUMIN/GLOB SERPL: 2.2 G/DL
ALP SERPL-CCNC: 71 U/L (ref 39–117)
ALT SERPL W P-5'-P-CCNC: 21 U/L (ref 1–33)
ANION GAP SERPL CALCULATED.3IONS-SCNC: 10.6 MMOL/L (ref 5–15)
AST SERPL-CCNC: 26 U/L (ref 1–32)
BILIRUB SERPL-MCNC: 0.4 MG/DL (ref 0–1.2)
BUN SERPL-MCNC: 21 MG/DL (ref 8–23)
BUN/CREAT SERPL: 19.1 (ref 7–25)
CALCIUM SPEC-SCNC: 9.7 MG/DL (ref 8.6–10.5)
CHLORIDE SERPL-SCNC: 103 MMOL/L (ref 98–107)
CHOLEST SERPL-MCNC: 144 MG/DL (ref 0–200)
CO2 SERPL-SCNC: 26.4 MMOL/L (ref 22–29)
CREAT SERPL-MCNC: 1.1 MG/DL (ref 0.57–1)
EGFRCR SERPLBLD CKD-EPI 2021: 51.2 ML/MIN/1.73
GLOBULIN UR ELPH-MCNC: 2.1 GM/DL
GLUCOSE SERPL-MCNC: 74 MG/DL (ref 65–99)
HDLC SERPL-MCNC: 59 MG/DL (ref 40–60)
LDLC SERPL CALC-MCNC: 71 MG/DL (ref 0–100)
LDLC/HDLC SERPL: 1.2 {RATIO}
POTASSIUM SERPL-SCNC: 3.6 MMOL/L (ref 3.5–5.2)
PROT SERPL-MCNC: 6.7 G/DL (ref 6–8.5)
SODIUM SERPL-SCNC: 140 MMOL/L (ref 136–145)
T4 FREE SERPL-MCNC: 1.17 NG/DL (ref 0.93–1.7)
TRIGL SERPL-MCNC: 70 MG/DL (ref 0–150)
TSH SERPL DL<=0.05 MIU/L-ACNC: 1.39 UIU/ML (ref 0.27–4.2)
VLDLC SERPL-MCNC: 14 MG/DL (ref 5–40)

## 2024-04-19 RX ORDER — AMLODIPINE BESYLATE 5 MG/1
5 TABLET ORAL DAILY
Qty: 90 TABLET | Refills: 1 | Status: SHIPPED | OUTPATIENT
Start: 2024-04-19

## 2024-04-19 RX ORDER — ATORVASTATIN CALCIUM 10 MG/1
10 TABLET, FILM COATED ORAL DAILY
Qty: 90 TABLET | Refills: 1 | Status: SHIPPED | OUTPATIENT
Start: 2024-04-19

## 2024-04-19 RX ORDER — LOSARTAN POTASSIUM AND HYDROCHLOROTHIAZIDE 12.5; 1 MG/1; MG/1
1 TABLET ORAL DAILY
Qty: 90 TABLET | Refills: 1 | Status: SHIPPED | OUTPATIENT
Start: 2024-04-19

## 2024-04-19 NOTE — ASSESSMENT & PLAN NOTE
Blood sugar and 90 day average sugar reviewed  Results for orders placed or performed in visit on 04/18/24   Comprehensive Metabolic Panel    Specimen: Blood   Result Value Ref Range    Glucose 74 65 - 99 mg/dL    BUN 21 8 - 23 mg/dL    Creatinine 1.10 (H) 0.57 - 1.00 mg/dL    Sodium 140 136 - 145 mmol/L    Potassium 3.6 3.5 - 5.2 mmol/L    Chloride 103 98 - 107 mmol/L    CO2 26.4 22.0 - 29.0 mmol/L    Calcium 9.7 8.6 - 10.5 mg/dL    Total Protein 6.7 6.0 - 8.5 g/dL    Albumin 4.6 3.5 - 5.2 g/dL    ALT (SGPT) 21 1 - 33 U/L    AST (SGOT) 26 1 - 32 U/L    Alkaline Phosphatase 71 39 - 117 U/L    Total Bilirubin 0.4 0.0 - 1.2 mg/dL    Globulin 2.1 gm/dL    A/G Ratio 2.2 g/dL    BUN/Creatinine Ratio 19.1 7.0 - 25.0    Anion Gap 10.6 5.0 - 15.0 mmol/L    eGFR 51.2 (L) >60.0 mL/min/1.73   Lipid Panel    Specimen: Blood   Result Value Ref Range    Total Cholesterol 144 0 - 200 mg/dL    Triglycerides 70 0 - 150 mg/dL    HDL Cholesterol 59 40 - 60 mg/dL    LDL Cholesterol  71 0 - 100 mg/dL    VLDL Cholesterol 14 5 - 40 mg/dL    LDL/HDL Ratio 1.20    TSH    Specimen: Blood   Result Value Ref Range    TSH 1.390 0.270 - 4.200 uIU/mL   T4, Free    Specimen: Blood   Result Value Ref Range    Free T4 1.17 0.93 - 1.70 ng/dL   Vitamin D,25-Hydroxy    Specimen: Arm, Left; Blood   Result Value Ref Range    25 Hydroxy, Vitamin D 52.2 30.0 - 100.0 ng/ml   POC Glycosylated Hemoglobin (Hb A1C)    Specimen: Blood   Result Value Ref Range    Hemoglobin A1C 6.2 (A) 4.5 - 5.7 %    Lot Number [id number]     Expiration Date 01/24/2026    POC Glucose, Blood    Specimen: Blood   Result Value Ref Range    Glucose 96 70 - 130 mg/dL    Lot Number [id number]     Expiration Date 10/20/2024      Average sugar is good  Downloaded sensor and reviewed 7 days of sensor data  Continue adjustment novolog for higher pp sugars- she is working on her diet and adjustment of lantus discussed for weight loss   Is utd with eye exam  No foot ulceration- aashish and  callus note  Ur alb due  F/u 3-4 months

## 2024-05-28 RX ORDER — INSULIN LISPRO 100 [IU]/ML
INJECTION, SOLUTION INTRAVENOUS; SUBCUTANEOUS
Qty: 15 ML | Refills: 1 | Status: SHIPPED | OUTPATIENT
Start: 2024-05-28 | End: 2024-05-30 | Stop reason: ALTCHOICE

## 2024-05-28 NOTE — TELEPHONE ENCOUNTER
Rx Refill Note    Requested Prescriptions      No prescriptions requested or ordered in this encounter        Last office visit with prescribing clinician: 4/18/2024 th prescribing clinician: 8/6/2024   {

## 2024-05-30 ENCOUNTER — TELEPHONE (OUTPATIENT)
Dept: ENDOCRINOLOGY | Facility: CLINIC | Age: 79
End: 2024-05-30
Payer: MEDICARE

## 2024-05-30 RX ORDER — INSULIN LISPRO 100 [IU]/ML
INJECTION, SOLUTION INTRAVENOUS; SUBCUTANEOUS
Qty: 15 ML | Refills: 1 | Status: SHIPPED | OUTPATIENT
Start: 2024-05-30

## 2024-05-30 RX ORDER — INSULIN LISPRO 100 [IU]/ML
INJECTION, SOLUTION INTRAVENOUS; SUBCUTANEOUS
Qty: 15 ML | Refills: 1 | Status: SHIPPED | OUTPATIENT
Start: 2024-05-30 | End: 2024-05-30 | Stop reason: SDUPTHER

## 2024-05-30 NOTE — TELEPHONE ENCOUNTER
[facility] called stated they are having issues with their computer system this morning and patients rx deleted. They are requesting we resend her script for Humalog. Please resend.

## 2024-08-06 ENCOUNTER — OFFICE VISIT (OUTPATIENT)
Dept: ENDOCRINOLOGY | Facility: CLINIC | Age: 79
End: 2024-08-06
Payer: MEDICARE

## 2024-08-06 VITALS
HEIGHT: 65 IN | DIASTOLIC BLOOD PRESSURE: 70 MMHG | OXYGEN SATURATION: 99 % | SYSTOLIC BLOOD PRESSURE: 132 MMHG | HEART RATE: 62 BPM | WEIGHT: 161 LBS | BODY MASS INDEX: 26.82 KG/M2

## 2024-08-06 DIAGNOSIS — I10 BENIGN ESSENTIAL HYPERTENSION: ICD-10-CM

## 2024-08-06 DIAGNOSIS — E10.42 TYPE 1 DIABETES MELLITUS WITH DIABETIC POLYNEUROPATHY: Primary | ICD-10-CM

## 2024-08-06 LAB
EXPIRATION DATE: ABNORMAL
EXPIRATION DATE: NORMAL
GLUCOSE BLDC GLUCOMTR-MCNC: 130 MG/DL (ref 70–130)
HBA1C MFR BLD: 6.5 % (ref 4.5–5.7)
Lab: ABNORMAL
Lab: NORMAL

## 2024-08-06 PROCEDURE — 82043 UR ALBUMIN QUANTITATIVE: CPT | Performed by: INTERNAL MEDICINE

## 2024-08-06 PROCEDURE — 3078F DIAST BP <80 MM HG: CPT | Performed by: INTERNAL MEDICINE

## 2024-08-06 PROCEDURE — 3075F SYST BP GE 130 - 139MM HG: CPT | Performed by: INTERNAL MEDICINE

## 2024-08-06 PROCEDURE — 1160F RVW MEDS BY RX/DR IN RCRD: CPT | Performed by: INTERNAL MEDICINE

## 2024-08-06 PROCEDURE — 3044F HG A1C LEVEL LT 7.0%: CPT | Performed by: INTERNAL MEDICINE

## 2024-08-06 PROCEDURE — 82570 ASSAY OF URINE CREATININE: CPT | Performed by: INTERNAL MEDICINE

## 2024-08-06 PROCEDURE — 95251 CONT GLUC MNTR ANALYSIS I&R: CPT | Performed by: INTERNAL MEDICINE

## 2024-08-06 PROCEDURE — 1159F MED LIST DOCD IN RCRD: CPT | Performed by: INTERNAL MEDICINE

## 2024-08-06 PROCEDURE — 99214 OFFICE O/P EST MOD 30 MIN: CPT | Performed by: INTERNAL MEDICINE

## 2024-08-06 PROCEDURE — 83036 HEMOGLOBIN GLYCOSYLATED A1C: CPT | Performed by: INTERNAL MEDICINE

## 2024-08-06 RX ORDER — ESTRADIOL 0.1 MG/G
CREAM VAGINAL
COMMUNITY
Start: 2024-08-01

## 2024-08-06 NOTE — PROGRESS NOTES
"Chinyere Medina Wade 79 y.o.  CC: follow up Diabetes (IDDM - using sub q insulin and G7 ), Hypertension, and Hyperlipidemia      [place]: follow up Diabetes (IDDM - using sub q insulin and G7 ), Hypertension, and Hyperlipidemia    Hurt ankle- left ankle- bruising   Bp is good taking norvasc and hyzaar  Is eating low fat diet and taking lipitor 10 mg daily     Allergies   Allergen Reactions    Polymyxin B Other (See Comments)    Polymyxin B-Trimethoprim Other (See Comments)    Penicillins Rash       Current Outpatient Medications:     ACCU-CHEK GUIDE test strip, Test blood sugars QID, Disp: 400 each, Rfl: 3    amLODIPine (NORVASC) 5 MG tablet, Take 1 tablet by mouth Daily., Disp: 90 tablet, Rfl: 1    aspirin 81 MG EC tablet, Take 1 tablet by mouth Daily., Disp: , Rfl:     atorvastatin (LIPITOR) 10 MG tablet, Take 1 tablet by mouth Daily., Disp: 90 tablet, Rfl: 1    Blood Glucose Monitoring Suppl (ACCU-CHEK GUIDE) w/Device kit, 1 Device Daily., Disp: 1 kit, Rfl: 0    buPROPion XL (WELLBUTRIN XL) 150 MG 24 hr tablet, , Disp: , Rfl:     Calcium Citrate-Vitamin D 250-200 MG-UNIT tablet, , Disp: , Rfl:     Continuous Blood Gluc Sensor (Dexcom G7 Sensor) misc, Use 1 Units Every 10 (Ten) Days., Disp: 9 each, Rfl: 1    estradiol (ESTRACE) 0.1 MG/GM vaginal cream, USE ONE GRAM IN THE VAGINA 3 TIMES WEEKLY, Disp: , Rfl:     Ferrous Fumarate (IRON) 18 MG tablet controlled-release, Take  by mouth Every Other Day., Disp: , Rfl:     glucose blood (ONE TOUCH ULTRA TEST) test strip, Test blood sugars QID, Disp: 400 each, Rfl: 3    Insulin Lispro, 1 Unit Dial, (HumaLOG KwikPen) 100 UNIT/ML solution pen-injector, Inject 10U TID before meals, Disp: 15 mL, Rfl: 1    Insulin Pen Needle (B-D ULTRAFINE III SHORT PEN) 31G X 8 MM misc, USE FOUR TIMES DAILY TO INJECT INSULIN, Disp: 400 each, Rfl: 3    Insulin Syringe-Needle U-100 31G X 5/16\" 0.5 ML misc, , Disp: , Rfl:     lansoprazole (PREVACID) 30 MG capsule, Take  by mouth., Disp: , Rfl:     " Eduardo Jordanar 100 UNIT/ML injection pen, INJECT 30 UNITS UNDER THE SKIN INTO THE APPROPRIATE AREA AS DIRECTED EVERY NIGHT., Disp: 30 mL, Rfl: 1    losartan-hydrochlorothiazide (HYZAAR) 100-12.5 MG per tablet, Take 1 tablet by mouth Daily., Disp: 90 tablet, Rfl: 1    metoprolol tartrate (LOPRESSOR) 25 MG tablet, Take 0.5 tablets by mouth 2 (Two) Times a Day., Disp: 90 tablet, Rfl: 1    Multiple Vitamins-Minerals (MULTIVITAMIN ADULTS 50+) tablet, , Disp: , Rfl:     Omega-3 Fatty Acids (FISH OIL) 1000 MG capsule capsule, Take  by mouth daily., Disp: , Rfl:     PRODIGY TWIST TOP LANCETS 28G misc, Use 4 per day to test blood sugars, Disp: 400 each, Rfl: 3    zinc gluconate 50 MG tablet, zinc gluconate 50 mg tablet  Daily, Disp: , Rfl:   Patient Active Problem List    Diagnosis     Stage 3a chronic kidney disease [N18.31]     Malaise and fatigue [R53.81, R53.83]     Pelvic organ prolapse quantification stage 4 cystocele [N81.10]     Lumbar radiculopathy [M54.16]     Chronic constipation [K59.09]     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]     Low back pain [M54.50]     Recurrent major depression [F33.9]     Severe nonproliferative diabetic retinopathy [E11.3499]     Mixed anxiety and depressive disorder [F41.8]     Abdominal bloating [R14.0]     Preoperative evaluation to rule out surgical contraindication [Z01.818]     Obstructive sleep apnea syndrome [G47.33]     Peripheral neuropathy, idiopathic [G60.9]     Stress fracture of foot [M84.376A]     Foot pain [M79.673]     Pain in left foot [M79.672]     Moderate nonproliferative diabetic retinopathy associated with type 2 diabetes mellitus [E11.3399]     Abnormal liver function tests [R79.89]     Anemia [D64.9]     Benign essential hypertension [I10]     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]     Gastroesophageal reflux disease [K21.9]     Hyperlipidemia [E78.5]     Hypokalemia [E87.6]     Calculus of kidney [N20.0]   "   Cyst of ovary [N83.209]     Renal insufficiency [N28.9]     Solitary pulmonary nodule [R91.1]     Vitamin D deficiency [E55.9]     Abnormal weight loss [R63.4]      Review of Systems   Constitutional:  Positive for activity change. Negative for appetite change and unexpected weight change.   HENT:  Negative for congestion and rhinorrhea.    Eyes:  Negative for visual disturbance.   Respiratory:  Negative for cough and shortness of breath.    Cardiovascular:  Negative for palpitations and leg swelling.   Gastrointestinal:  Negative for constipation, diarrhea and nausea.   Genitourinary:  Negative for hematuria.   Musculoskeletal:  Positive for arthralgias and joint swelling. Negative for back pain, gait problem and myalgias.   Skin:  Negative for color change, rash and wound.   Allergic/Immunologic: Negative for environmental allergies, food allergies and immunocompromised state.   Neurological:  Negative for dizziness, weakness and light-headedness.   Psychiatric/Behavioral:  Negative for confusion, decreased concentration, dysphoric mood and sleep disturbance. The patient is not nervous/anxious.      Social History     Socioeconomic History    Marital status:    Tobacco Use    Smoking status: Former     Passive exposure: Past    Smokeless tobacco: Never   Vaping Use    Vaping status: Never Used   Substance and Sexual Activity    Alcohol use: Yes     Alcohol/week: 3.0 standard drinks of alcohol     Types: 3 Glasses of wine per week     Comment: social    Drug use: No    Sexual activity: Defer     Family History   Problem Relation Age of Onset    Diabetes Father     Anemia Maternal Aunt     Diabetes Maternal Grandfather     Coronary artery disease Other     Breast cancer Neg Hx     Ovarian cancer Neg Hx      /70 (BP Location: Left arm, Patient Position: Sitting)   Pulse 62   Ht 165.1 cm (65\")   Wt 73 kg (161 lb)   SpO2 99%   BMI 26.79 kg/m²   Physical Exam  Vitals and nursing note reviewed. "   Constitutional:       Appearance: Normal appearance. She is well-developed.   HENT:      Head: Normocephalic and atraumatic.   Eyes:      General: Lids are normal.      Conjunctiva/sclera: Conjunctivae normal.      Pupils: Pupils are equal, round, and reactive to light.   Neck:      Thyroid: No thyroid mass or thyromegaly.      Vascular: No carotid bruit.      Trachea: Trachea normal. No tracheal deviation.   Cardiovascular:      Rate and Rhythm: Normal rate and regular rhythm.      Heart sounds: Normal heart sounds. No murmur heard.     No friction rub. No gallop.   Pulmonary:      Effort: Pulmonary effort is normal. No respiratory distress.      Breath sounds: Normal breath sounds. No wheezing.   Musculoskeletal:         General: Swelling and tenderness present. No deformity. Normal range of motion.      Cervical back: Normal range of motion and neck supple.   Lymphadenopathy:      Cervical: No cervical adenopathy.   Skin:     General: Skin is warm and dry.      Findings: Bruising (left lateral ankle) present. No erythema or rash.      Nails: There is no clubbing.   Neurological:      Mental Status: She is alert and oriented to person, place, and time.      Cranial Nerves: No cranial nerve deficit.      Deep Tendon Reflexes: Reflexes are normal and symmetric. Reflexes normal.   Psychiatric:         Speech: Speech normal.         Behavior: Behavior normal.         Thought Content: Thought content normal.         Judgment: Judgment normal.       Results for orders placed or performed in visit on 08/06/24   POC Glucose, Blood    Specimen: Blood   Result Value Ref Range    Glucose 130 70 - 130 mg/dL    Lot Number [id number]     Expiration Date 12-7-24    POC Glycosylated Hemoglobin (Hb A1C)    Specimen: Blood   Result Value Ref Range    Hemoglobin A1C 6.5 (A) 4.5 - 5.7 %    Lot Number [id number]     Expiration Date 4-4-26      Diagnoses and all orders for this visit:    1. Type 1 diabetes mellitus with diabetic  polyneuropathy (Primary)  Assessment & Plan:  Blood sugar and 90 day average sugar reviewed  Results for orders placed or performed in visit on 08/06/24   POC Glucose, Blood    Specimen: Blood   Result Value Ref Range    Glucose 130 70 - 130 mg/dL    Lot Number [id number]     Expiration Date 12-7-24    POC Glycosylated Hemoglobin (Hb A1C)    Specimen: Blood   Result Value Ref Range    Hemoglobin A1C 6.5 (A) 4.5 - 5.7 %    Lot Number [id number]     Expiration Date 4-4-26      Downloaded 13 days of G7 sensor data and reviewed with higher evening sugars- she is using sensor and humalog to correct   Is utd with eye exam  No foot lesion other than bruising from twisting ankle - left lateral ankle   Ur alb due - gave cup and she will collect  Update eye exam  F/u 3-4 months     Orders:  -     Cancel: POC Glycosylated Hemoglobin (Hb A1C)  -     Cancel: POC Glucose, Blood  -     POC Glucose, Blood  -     POC Glycosylated Hemoglobin (Hb A1C)  -     Microalbumin / Creatinine Urine Ratio - Urine, Clean Catch; Future    2. Benign essential hypertension  Assessment & Plan:  BP is controlled taking hyzaar and lopressor   Continue monitoring and medication         Return in about 4 months (around 12/6/2024) for Recheck.    Electronically signed by: Maria Eugenia Lawrence MD

## 2024-08-06 NOTE — ASSESSMENT & PLAN NOTE
Blood sugar and 90 day average sugar reviewed  Results for orders placed or performed in visit on 08/06/24   POC Glucose, Blood    Specimen: Blood   Result Value Ref Range    Glucose 130 70 - 130 mg/dL    Lot Number [id number]     Expiration Date 12-7-24    POC Glycosylated Hemoglobin (Hb A1C)    Specimen: Blood   Result Value Ref Range    Hemoglobin A1C 6.5 (A) 4.5 - 5.7 %    Lot Number [id number]     Expiration Date 4-4-26      Downloaded 13 days of G7 sensor data and reviewed with higher evening sugars- she is using sensor and humalog to correct   Is utd with eye exam  No foot lesion other than bruising from twisting ankle - left lateral ankle   Ur alb due - gave cup and she will collect  Update eye exam  F/u 3-4 months

## 2024-08-07 LAB
ALBUMIN UR-MCNC: 25.7 MG/DL
CREAT UR-MCNC: 125.1 MG/DL
MICROALBUMIN/CREAT UR: 205.4 MG/G (ref 0–29)

## 2024-10-29 RX ORDER — METOPROLOL TARTRATE 25 MG/1
TABLET, FILM COATED ORAL
Qty: 90 TABLET | Refills: 1 | Status: SHIPPED | OUTPATIENT
Start: 2024-10-29

## 2024-10-29 RX ORDER — LOSARTAN POTASSIUM AND HYDROCHLOROTHIAZIDE 12.5; 1 MG/1; MG/1
1 TABLET ORAL DAILY
Qty: 90 TABLET | Refills: 1 | Status: SHIPPED | OUTPATIENT
Start: 2024-10-29

## 2024-10-29 RX ORDER — AMLODIPINE BESYLATE 5 MG/1
5 TABLET ORAL DAILY
Qty: 90 TABLET | Refills: 1 | Status: SHIPPED | OUTPATIENT
Start: 2024-10-29

## 2024-10-29 RX ORDER — ATORVASTATIN CALCIUM 10 MG/1
10 TABLET, FILM COATED ORAL DAILY
Qty: 90 TABLET | Refills: 1 | Status: SHIPPED | OUTPATIENT
Start: 2024-10-29

## 2024-10-29 NOTE — TELEPHONE ENCOUNTER
Rx Refill Note  Requested Prescriptions     Pending Prescriptions Disp Refills    atorvastatin (LIPITOR) 10 MG tablet [Pharmacy Med Name: ATORVASTATIN 10 MG TABLET 10 Tablet] 90 tablet 1     Sig: Take 1 tablet by mouth Daily.    amLODIPine (NORVASC) 5 MG tablet [Pharmacy Med Name: AMLODIPINE BESYLATE 5 MG TA 5 Tablet] 90 tablet 1     Sig: Take 1 tablet by mouth Daily.    metoprolol tartrate (LOPRESSOR) 25 MG tablet [Pharmacy Med Name: METOPROLOL TARTRATE 25 MG T 25 Tablet] 90 tablet 1     Sig: TAKE 1/2 TABLET BY MOUTH TWICE DAILY -TAKE WITH FOOD    losartan-hydrochlorothiazide (HYZAAR) 100-12.5 MG per tablet [Pharmacy Med Name: LOSARTAN-HCTZ 100-12.5 MG T 100-12.5 Tablet] 90 tablet 1     Sig: Take 1 tablet by mouth Daily.      Last office visit with prescribing clinician: 8/6/2024      Next office visit with prescribing clinician: 12/12/2024                  Leona Georges MA  10/29/24, 15:59 EDT

## 2024-11-07 RX ORDER — PEN NEEDLE, DIABETIC 31 GX5/16"
NEEDLE, DISPOSABLE MISCELLANEOUS
Qty: 400 EACH | Refills: 1 | Status: SHIPPED | OUTPATIENT
Start: 2024-11-07

## 2024-11-07 NOTE — TELEPHONE ENCOUNTER
Rx Refill Note  Requested Prescriptions     Pending Prescriptions Disp Refills    B-D ULTRAFINE III SHORT PEN 31G X 8 MM misc [Pharmacy Med Name: BD UF PEN NDL SHORT 8 X 31 31G X 8 MM Miscellaneous] 300 each 3     Sig: USE FOUR TIMES DAILY TO INJECT INSULIN      Last office visit with prescribing clinician: 8/6/2024     Next office visit with prescribing clinician: 12/12/2024

## 2024-12-05 RX ORDER — INSULIN LISPRO 100 [IU]/ML
INJECTION, SOLUTION INTRAVENOUS; SUBCUTANEOUS
Qty: 15 ML | Refills: 0 | Status: SHIPPED | OUTPATIENT
Start: 2024-12-05

## 2024-12-05 NOTE — TELEPHONE ENCOUNTER
Rx Refill Note    Requested Prescriptions     Pending Prescriptions Disp Refills    Insulin Lispro, 1 Unit Dial, (HumaLOG KwikPen) 100 UNIT/ML solution pen-injector [Pharmacy Med Name: HUMALOG KWIK  Solution Pen-injector] 15 mL 1     Sig: INJECT 10 UNITS THREE TIMES A DAY BEFORE MEALS - REPLACES NOVOLOG PER INSURANCE/DOCTOR        Last office visit with prescribing clinician: 8/6/2024     Next office visit with prescribing clinician: 12/12/2024   {

## 2024-12-12 ENCOUNTER — OFFICE VISIT (OUTPATIENT)
Dept: ENDOCRINOLOGY | Facility: CLINIC | Age: 79
End: 2024-12-12
Payer: MEDICARE

## 2024-12-12 VITALS
DIASTOLIC BLOOD PRESSURE: 68 MMHG | WEIGHT: 161 LBS | HEART RATE: 69 BPM | HEIGHT: 65 IN | BODY MASS INDEX: 26.82 KG/M2 | SYSTOLIC BLOOD PRESSURE: 130 MMHG

## 2024-12-12 DIAGNOSIS — I10 BENIGN ESSENTIAL HYPERTENSION: ICD-10-CM

## 2024-12-12 DIAGNOSIS — E78.00 PURE HYPERCHOLESTEROLEMIA: ICD-10-CM

## 2024-12-12 DIAGNOSIS — E10.42 TYPE 1 DIABETES MELLITUS WITH DIABETIC POLYNEUROPATHY: Primary | ICD-10-CM

## 2024-12-12 DIAGNOSIS — M79.89 LEFT LEG SWELLING: ICD-10-CM

## 2024-12-12 LAB
ALBUMIN SERPL-MCNC: 4.4 G/DL (ref 3.5–5.2)
ALBUMIN/GLOB SERPL: 1.6 G/DL
ALP SERPL-CCNC: 84 U/L (ref 39–117)
ALT SERPL W P-5'-P-CCNC: 29 U/L (ref 1–33)
ANION GAP SERPL CALCULATED.3IONS-SCNC: 13.4 MMOL/L (ref 5–15)
AST SERPL-CCNC: 31 U/L (ref 1–32)
BILIRUB SERPL-MCNC: 0.4 MG/DL (ref 0–1.2)
BUN SERPL-MCNC: 17 MG/DL (ref 8–23)
BUN/CREAT SERPL: 14.5 (ref 7–25)
CALCIUM SPEC-SCNC: 9.8 MG/DL (ref 8.6–10.5)
CHLORIDE SERPL-SCNC: 104 MMOL/L (ref 98–107)
CHOLEST SERPL-MCNC: 140 MG/DL (ref 0–200)
CO2 SERPL-SCNC: 26.6 MMOL/L (ref 22–29)
CREAT SERPL-MCNC: 1.17 MG/DL (ref 0.57–1)
EGFRCR SERPLBLD CKD-EPI 2021: 47.6 ML/MIN/1.73
EXPIRATION DATE: ABNORMAL
EXPIRATION DATE: NORMAL
GLOBULIN UR ELPH-MCNC: 2.8 GM/DL
GLUCOSE BLDC GLUCOMTR-MCNC: 78 MG/DL (ref 70–130)
GLUCOSE SERPL-MCNC: 71 MG/DL (ref 65–99)
HBA1C MFR BLD: 6.4 % (ref 4.5–5.7)
HDLC SERPL-MCNC: 57 MG/DL (ref 40–60)
LDLC SERPL CALC-MCNC: 69 MG/DL (ref 0–100)
LDLC/HDLC SERPL: 1.2 {RATIO}
Lab: ABNORMAL
Lab: NORMAL
POTASSIUM SERPL-SCNC: 4 MMOL/L (ref 3.5–5.2)
PROT SERPL-MCNC: 7.2 G/DL (ref 6–8.5)
SODIUM SERPL-SCNC: 144 MMOL/L (ref 136–145)
T4 FREE SERPL-MCNC: 1.43 NG/DL (ref 0.92–1.68)
TRIGL SERPL-MCNC: 72 MG/DL (ref 0–150)
TSH SERPL DL<=0.05 MIU/L-ACNC: 2.61 UIU/ML (ref 0.27–4.2)
VIT B12 BLD-MCNC: >2000 PG/ML (ref 211–946)
VLDLC SERPL-MCNC: 14 MG/DL (ref 5–40)

## 2024-12-12 PROCEDURE — 80061 LIPID PANEL: CPT | Performed by: INTERNAL MEDICINE

## 2024-12-12 PROCEDURE — 80053 COMPREHEN METABOLIC PANEL: CPT | Performed by: INTERNAL MEDICINE

## 2024-12-12 PROCEDURE — 84443 ASSAY THYROID STIM HORMONE: CPT | Performed by: INTERNAL MEDICINE

## 2024-12-12 PROCEDURE — 84439 ASSAY OF FREE THYROXINE: CPT | Performed by: INTERNAL MEDICINE

## 2024-12-12 PROCEDURE — 82607 VITAMIN B-12: CPT | Performed by: INTERNAL MEDICINE

## 2024-12-12 NOTE — ASSESSMENT & PLAN NOTE
Blood sugar and 90 day average sugar reviewed  Results for orders placed or performed in visit on 12/12/24   POC Glucose, Blood    Collection Time: 12/12/24  8:51 AM    Specimen: Blood   Result Value Ref Range    Glucose 78 70 - 130 mg/dL    Lot Number [id number]     Expiration Date 05/30/2025    POC Glycosylated Hemoglobin (Hb A1C)    Collection Time: 12/12/24  8:54 AM    Specimen: Blood   Result Value Ref Range    Hemoglobin A1C 6.4 (A) 4.5 - 5.7 %    Lot Number [id number]     Expiration Date 06/20/2026      Juice provided for low sugar, discussed not giving any humalog if not eating  Downloaded sensor and reviewed 13 days of sensor data  Some higher pp sugars- discussed carb counting and bolusing prior to meal  Higher overnight sugars discussed  Is utd with eye exam  No foot lesion- has foot deformity   Ur alb neg  F/u 3-4 months

## 2024-12-12 NOTE — PROGRESS NOTES
Chinyere Olvera 79 y.o.  CC: follow up IDDM, Hypertension    [place]: follow up IDDM, Hypertension    Swelling left leg over past 1-2 weeks  Has been on feet more   No injury   Not painful   More distal ankle and foot   BP is good today = does take norvasc   Is eating low fat diet and taking lipitor daily   Blood sugar and 90 day average sugar reviewed  Results for orders placed or performed in visit on 12/12/24   POC Glucose, Blood    Collection Time: 12/12/24  8:51 AM    Specimen: Blood   Result Value Ref Range    Glucose 78 70 - 130 mg/dL    Lot Number [id number]     Expiration Date 05/30/2025    POC Glycosylated Hemoglobin (Hb A1C)    Collection Time: 12/12/24  8:54 AM    Specimen: Blood   Result Value Ref Range    Hemoglobin A1C 6.4 (A) 4.5 - 5.7 %    Lot Number [id number]     Expiration Date 06/20/2026      Average sugar is good  Prior to being seen blood sugar dropped to 61 and she was given juice  She did take 5 u of short acting insulin this morning and did not eat   BP is good       Allergies   Allergen Reactions    Polymyxin B Other (See Comments)    Polymyxin B-Trimethoprim Other (See Comments)    Penicillins Rash       Current Outpatient Medications:     ACCU-CHEK GUIDE test strip, Test blood sugars QID, Disp: 400 each, Rfl: 3    amLODIPine (NORVASC) 5 MG tablet, TAKE 1 TABLET BY MOUTH DAILY., Disp: 90 tablet, Rfl: 1    aspirin 81 MG EC tablet, Take 1 tablet by mouth Daily., Disp: , Rfl:     atorvastatin (LIPITOR) 10 MG tablet, TAKE 1 TABLET BY MOUTH DAILY., Disp: 90 tablet, Rfl: 1    Blood Glucose Monitoring Suppl (ACCU-CHEK GUIDE) w/Device kit, 1 Device Daily., Disp: 1 kit, Rfl: 0    buPROPion XL (WELLBUTRIN XL) 150 MG 24 hr tablet, , Disp: , Rfl:     Calcium Citrate-Vitamin D 250-200 MG-UNIT tablet, , Disp: , Rfl:     Continuous Blood Gluc Sensor (Dexcom G7 Sensor) misc, Use 1 Units Every 10 (Ten) Days., Disp: 9 each, Rfl: 1    estradiol (ESTRACE) 0.1 MG/GM vaginal cream, USE ONE GRAM IN THE VAGINA 3  "TIMES WEEKLY, Disp: , Rfl:     Ferrous Fumarate (IRON) 18 MG tablet controlled-release, Take  by mouth Every Other Day., Disp: , Rfl:     glucose blood (ONE TOUCH ULTRA TEST) test strip, Test blood sugars QID, Disp: 400 each, Rfl: 3    Insulin Lispro, 1 Unit Dial, (HumaLOG KwikPen) 100 UNIT/ML solution pen-injector, INJECT 10 UNITS THREE TIMES A DAY BEFORE MEALS - REPLACES NOVOLOG PER INSURANCE/DOCTOR, Disp: 15 mL, Rfl: 0    Insulin Pen Needle (B-D ULTRAFINE III SHORT PEN) 31G X 8 MM misc, USE FOUR TIMES DAILY TO INJECT INSULIN, Disp: 400 each, Rfl: 1    Insulin Syringe-Needle U-100 31G X 5/16\" 0.5 ML misc, , Disp: , Rfl:     lansoprazole (PREVACID) 30 MG capsule, Take  by mouth., Disp: , Rfl:     Lantus SoloStar 100 UNIT/ML injection pen, INJECT 30 UNITS UNDER THE SKIN INTO THE APPROPRIATE AREA AS DIRECTED EVERY NIGHT., Disp: 30 mL, Rfl: 1    losartan-hydrochlorothiazide (HYZAAR) 100-12.5 MG per tablet, TAKE 1 TABLET BY MOUTH DAILY., Disp: 90 tablet, Rfl: 1    metoprolol tartrate (LOPRESSOR) 25 MG tablet, TAKE 1/2 TABLET BY MOUTH TWICE DAILY -TAKE WITH FOOD, Disp: 90 tablet, Rfl: 1    Multiple Vitamins-Minerals (MULTIVITAMIN ADULTS 50+) tablet, , Disp: , Rfl:     Omega-3 Fatty Acids (FISH OIL) 1000 MG capsule capsule, Take  by mouth daily., Disp: , Rfl:     PRODIGY TWIST TOP LANCETS 28G misc, Use 4 per day to test blood sugars, Disp: 400 each, Rfl: 3    zinc gluconate 50 MG tablet, zinc gluconate 50 mg tablet  Daily, Disp: , Rfl:     Patient Active Problem List    Diagnosis     Left leg swelling [M79.89]     Stage 3a chronic kidney disease [N18.31]     Malaise and fatigue [R53.81, R53.83]     Pelvic organ prolapse quantification stage 4 cystocele [N81.10]     Lumbar radiculopathy [M54.16]     Chronic constipation [K59.09]     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]     Low back pain [M54.50]     Recurrent major depression [F33.9]     Severe nonproliferative diabetic retinopathy [E11.8069]  "    Mixed anxiety and depressive disorder [F41.8]     Abdominal bloating [R14.0]     Preoperative evaluation to rule out surgical contraindication [Z01.818]     Obstructive sleep apnea syndrome [G47.33]     Peripheral neuropathy, idiopathic [G60.9]     Stress fracture of foot [M84.376A]     Foot pain [M79.673]     Pain in left foot [M79.672]     Moderate nonproliferative diabetic retinopathy associated with type 2 diabetes mellitus [E11.3399]     Abnormal liver function tests [R79.89]     Anemia [D64.9]     Benign essential hypertension [I10]     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]     Gastroesophageal reflux disease [K21.9]     Hyperlipidemia [E78.5]     Hypokalemia [E87.6]     Calculus of kidney [N20.0]     Cyst of ovary [N83.209]     Renal insufficiency [N28.9]     Solitary pulmonary nodule [R91.1]     Vitamin D deficiency [E55.9]     Abnormal weight loss [R63.4]      Review of Systems   Constitutional:  Negative for activity change, appetite change and unexpected weight change.   HENT:  Negative for congestion and rhinorrhea.    Eyes:  Negative for visual disturbance.   Respiratory:  Negative for cough and shortness of breath.    Cardiovascular:  Positive for leg swelling. Negative for palpitations.   Gastrointestinal:  Negative for constipation, diarrhea and nausea.   Genitourinary:  Negative for hematuria.   Musculoskeletal:  Positive for arthralgias. Negative for back pain, gait problem, joint swelling and myalgias.   Skin:  Negative for color change, rash and wound.   Allergic/Immunologic: Negative for environmental allergies, food allergies and immunocompromised state.   Neurological:  Negative for dizziness, weakness and light-headedness.   Psychiatric/Behavioral:  Negative for confusion, decreased concentration, dysphoric mood and sleep disturbance. The patient is not nervous/anxious.      Social History     Socioeconomic History    Marital status:    Tobacco Use     "Smoking status: Former     Passive exposure: Past    Smokeless tobacco: Never   Vaping Use    Vaping status: Never Used   Substance and Sexual Activity    Alcohol use: Yes     Alcohol/week: 3.0 standard drinks of alcohol     Types: 3 Glasses of wine per week     Comment: social    Drug use: No    Sexual activity: Defer     Family History   Problem Relation Age of Onset    Diabetes Father     Anemia Maternal Aunt     Diabetes Maternal Grandfather     Coronary artery disease Other     Breast cancer Neg Hx     Ovarian cancer Neg Hx      /68   Pulse 69   Ht 165.1 cm (65\")   Wt 73 kg (161 lb)   BMI 26.79 kg/m²   Physical Exam  Vitals and nursing note reviewed.   Constitutional:       Appearance: Normal appearance. She is well-developed.   HENT:      Head: Normocephalic and atraumatic.   Eyes:      General: Lids are normal.      Extraocular Movements: Extraocular movements intact.      Conjunctiva/sclera: Conjunctivae normal.      Pupils: Pupils are equal, round, and reactive to light.   Neck:      Thyroid: No thyroid mass or thyromegaly.      Vascular: No carotid bruit.      Trachea: Trachea normal. No tracheal deviation.   Cardiovascular:      Rate and Rhythm: Normal rate and regular rhythm.      Heart sounds: Normal heart sounds. No murmur heard.     No friction rub. No gallop.   Pulmonary:      Effort: Pulmonary effort is normal. No respiratory distress.      Breath sounds: Normal breath sounds. No wheezing.   Musculoskeletal:         General: No deformity. Normal range of motion.      Cervical back: Normal range of motion and neck supple.      Left lower leg: Edema present.   Lymphadenopathy:      Cervical: No cervical adenopathy.   Skin:     General: Skin is warm and dry.      Findings: No erythema or rash.      Nails: There is no clubbing.   Neurological:      Mental Status: She is alert and oriented to person, place, and time.      Cranial Nerves: No cranial nerve deficit.      Deep Tendon Reflexes: " Reflexes are normal and symmetric. Reflexes normal.   Psychiatric:         Speech: Speech normal.         Behavior: Behavior normal.         Thought Content: Thought content normal.         Judgment: Judgment normal.       Results for orders placed or performed in visit on 12/12/24   POC Glucose, Blood    Collection Time: 12/12/24  8:51 AM    Specimen: Blood   Result Value Ref Range    Glucose 78 70 - 130 mg/dL    Lot Number [id number]     Expiration Date 05/30/2025    POC Glycosylated Hemoglobin (Hb A1C)    Collection Time: 12/12/24  8:54 AM    Specimen: Blood   Result Value Ref Range    Hemoglobin A1C 6.4 (A) 4.5 - 5.7 %    Lot Number [id number]     Expiration Date 06/20/2026      Diagnoses and all orders for this visit:    1. 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 (Primary)  Assessment & Plan:  Blood sugar and 90 day average sugar reviewed  Results for orders placed or performed in visit on 12/12/24   POC Glucose, Blood    Collection Time: 12/12/24  8:51 AM    Specimen: Blood   Result Value Ref Range    Glucose 78 70 - 130 mg/dL    Lot Number [id number]     Expiration Date 05/30/2025    POC Glycosylated Hemoglobin (Hb A1C)    Collection Time: 12/12/24  8:54 AM    Specimen: Blood   Result Value Ref Range    Hemoglobin A1C 6.4 (A) 4.5 - 5.7 %    Lot Number [id number]     Expiration Date 06/20/2026      Juice provided for low sugar, discussed not giving any humalog if not eating  Downloaded sensor and reviewed 13 days of sensor data  Some higher pp sugars- discussed carb counting and bolusing prior to meal  Higher overnight sugars discussed  Is utd with eye exam  No foot lesion- has foot deformity   Ur alb neg  F/u 3-4 months     Orders:  -     POC Glucose, Blood  -     POC Glycosylated Hemoglobin (Hb A1C)  -     Comprehensive Metabolic Panel; Future  -     Vitamin B12; Future  -     Comprehensive Metabolic Panel  -     Vitamin B12    2. Left leg swelling  Assessment & Plan:  With palpable swelling  behind knee- suspect bakers cyst but will get doppler due to focal edema      Orders:  -     Duplex Venous Lower Extremity - Left CAR; Future    3. Pure hypercholesterolemia  Assessment & Plan:  Is eating low fat diet, taking lipitor   Check flp     Orders:  -     T4, Free; Future  -     TSH; Future  -     Lipid Panel; Future  -     T4, Free  -     TSH  -     Lipid Panel    4. Benign essential hypertension  Assessment & Plan:  BP is controlled- continue norvasc, losartan and metoprolol      Return in about 3 months (around 3/12/2025) for Recheck.    Electronically signed by: Maria Eugenia Lawrence MD

## 2025-01-03 ENCOUNTER — HOSPITAL ENCOUNTER (OUTPATIENT)
Dept: CARDIOLOGY | Facility: HOSPITAL | Age: 80
Discharge: HOME | End: 2025-01-03
Payer: MEDICARE

## 2025-01-03 VITALS — HEIGHT: 65 IN | WEIGHT: 161 LBS | BODY MASS INDEX: 26.82 KG/M2

## 2025-01-03 DIAGNOSIS — M79.89 LEFT LEG SWELLING: ICD-10-CM

## 2025-01-03 LAB
BH CV LOW VAS LEFT LESSER SAPH VESSEL: 1
BH CV LOWER VASCULAR LEFT COMMON FEMORAL AUGMENT: NORMAL
BH CV LOWER VASCULAR LEFT COMMON FEMORAL COMPETENT: NORMAL
BH CV LOWER VASCULAR LEFT COMMON FEMORAL COMPRESS: NORMAL
BH CV LOWER VASCULAR LEFT COMMON FEMORAL PHASIC: NORMAL
BH CV LOWER VASCULAR LEFT COMMON FEMORAL SPONT: NORMAL
BH CV LOWER VASCULAR LEFT DISTAL FEMORAL COMPRESS: NORMAL
BH CV LOWER VASCULAR LEFT GASTRONEMIUS COMPRESS: NORMAL
BH CV LOWER VASCULAR LEFT GREATER SAPH AK COMPRESS: NORMAL
BH CV LOWER VASCULAR LEFT GREATER SAPH BK COMPRESS: NORMAL
BH CV LOWER VASCULAR LEFT LESSER SAPH COMPRESS: NORMAL
BH CV LOWER VASCULAR LEFT LESSER SAPH THROMBUS: NORMAL
BH CV LOWER VASCULAR LEFT MID FEMORAL AUGMENT: NORMAL
BH CV LOWER VASCULAR LEFT MID FEMORAL COMPETENT: NORMAL
BH CV LOWER VASCULAR LEFT MID FEMORAL COMPRESS: NORMAL
BH CV LOWER VASCULAR LEFT MID FEMORAL PHASIC: NORMAL
BH CV LOWER VASCULAR LEFT MID FEMORAL SPONT: NORMAL
BH CV LOWER VASCULAR LEFT PERONEAL COMPRESS: NORMAL
BH CV LOWER VASCULAR LEFT POPLITEAL AUGMENT: NORMAL
BH CV LOWER VASCULAR LEFT POPLITEAL COMPETENT: NORMAL
BH CV LOWER VASCULAR LEFT POPLITEAL COMPRESS: NORMAL
BH CV LOWER VASCULAR LEFT POPLITEAL PHASIC: NORMAL
BH CV LOWER VASCULAR LEFT POPLITEAL SPONT: NORMAL
BH CV LOWER VASCULAR LEFT POSTERIOR TIBIAL COMPRESS: NORMAL
BH CV LOWER VASCULAR LEFT PROXIMAL FEMORAL COMPRESS: NORMAL
BH CV LOWER VASCULAR LEFT SAPHENOFEMORAL JUNCTION COMPRESS: NORMAL
BH CV LOWER VASCULAR RIGHT COMMON FEMORAL AUGMENT: NORMAL
BH CV LOWER VASCULAR RIGHT COMMON FEMORAL COMPETENT: NORMAL
BH CV LOWER VASCULAR RIGHT COMMON FEMORAL PHASIC: NORMAL
BH CV LOWER VASCULAR RIGHT COMMON FEMORAL SPONT: NORMAL
BH CV POP FLUID COLLECT LEFT: 1

## 2025-01-03 PROCEDURE — 93971 EXTREMITY STUDY: CPT

## 2025-01-30 RX ORDER — INSULIN LISPRO 100 [IU]/ML
INJECTION, SOLUTION INTRAVENOUS; SUBCUTANEOUS
Qty: 15 ML | Refills: 2 | Status: SHIPPED | OUTPATIENT
Start: 2025-01-30

## 2025-01-30 NOTE — TELEPHONE ENCOUNTER
Rx Refill Note  Requested Prescriptions     Pending Prescriptions Disp Refills    Insulin Lispro, 1 Unit Dial, (HumaLOG KwikPen) 100 UNIT/ML solution pen-injector [Pharmacy Med Name: HUMALOG KWIK  Solution Pen-injector] 15 mL 0     Sig: INJECT 10 UNITS THREE TIMES A DAY BEFORE MEALS - REPLACES NOVOLOG PER INSURANCE/DOCTOR      Last office visit with prescribing clinician: 12/12/2024     Next office visit with prescribing clinician: 4/22/2025   {Maile Patiño MA  01/30/25, 10:23 EST

## 2025-02-27 ENCOUNTER — TRANSCRIBE ORDERS (OUTPATIENT)
Dept: ADMINISTRATIVE | Facility: HOSPITAL | Age: 80
End: 2025-02-27
Payer: MEDICARE

## 2025-02-27 DIAGNOSIS — Z12.31 ENCOUNTER FOR SCREENING MAMMOGRAM FOR MALIGNANT NEOPLASM OF BREAST: Primary | ICD-10-CM

## 2025-03-21 ENCOUNTER — TELEPHONE (OUTPATIENT)
Dept: ENDOCRINOLOGY | Facility: CLINIC | Age: 80
End: 2025-03-21
Payer: MEDICARE

## 2025-03-21 RX ORDER — INSULIN GLARGINE 100 [IU]/ML
30 INJECTION, SOLUTION SUBCUTANEOUS NIGHTLY
Qty: 30 ML | Refills: 0 | Status: SHIPPED | OUTPATIENT
Start: 2025-03-21

## 2025-03-21 RX ORDER — INSULIN GLARGINE 100 [IU]/ML
INJECTION, SOLUTION SUBCUTANEOUS
Qty: 30 ML | Refills: 1 | OUTPATIENT
Start: 2025-03-21

## 2025-03-21 NOTE — TELEPHONE ENCOUNTER
"Caller: Chinyere Olvera \"Chinyere Medina\"    Relationship: Self    Best call back number: [phone]     Requested Prescriptions:   Requested Prescriptions      No prescriptions requested or ordered in this encounter    [facility]    Pharmacy where request should be sent:   [facility]     Last office visit with prescribing clinician: 12/12/2024     Next office visit with prescribing clinician: 4/22/2025     Does the patient have less than a 3 day supply:  [x] Yes  [] No    Would you like a call back once the refill request has been completed: [x] Yes [] No    If the office needs to give you a call back, can they leave a voicemail: [x] Yes [] No    Magali Rick Rep   03/21/25 09:20 EDT         "

## 2025-04-21 RX ORDER — LOSARTAN POTASSIUM AND HYDROCHLOROTHIAZIDE 12.5; 1 MG/1; MG/1
1 TABLET ORAL DAILY
Qty: 90 TABLET | Refills: 1 | Status: SHIPPED | OUTPATIENT
Start: 2025-04-21

## 2025-04-21 RX ORDER — METOPROLOL TARTRATE 25 MG/1
TABLET, FILM COATED ORAL
Qty: 90 TABLET | Refills: 1 | Status: SHIPPED | OUTPATIENT
Start: 2025-04-21

## 2025-04-21 RX ORDER — AMLODIPINE BESYLATE 5 MG/1
5 TABLET ORAL DAILY
Qty: 90 TABLET | Refills: 1 | Status: SHIPPED | OUTPATIENT
Start: 2025-04-21

## 2025-04-21 RX ORDER — ATORVASTATIN CALCIUM 10 MG/1
10 TABLET, FILM COATED ORAL DAILY
Qty: 90 TABLET | Refills: 1 | Status: SHIPPED | OUTPATIENT
Start: 2025-04-21

## 2025-04-21 NOTE — TELEPHONE ENCOUNTER
Rx Refill Note  Requested Prescriptions     Pending Prescriptions Disp Refills    losartan-hydrochlorothiazide (HYZAAR) 100-12.5 MG per tablet [Pharmacy Med Name: LOSARTAN-HCTZ 100/12.5 -12.5 Tablet] 90 tablet 1     Sig: TAKE 1 TABLET BY MOUTH DAILY.    metoprolol tartrate (LOPRESSOR) 25 MG tablet [Pharmacy Med Name: METOPROLOL TARTRATE 25 MG T 25 Tablet] 90 tablet 1     Sig: TAKE 1/2 TABLET BY MOUTH TWICE DAILY -TAKE WITH FOOD    amLODIPine (NORVASC) 5 MG tablet [Pharmacy Med Name: AMLODIPINE BESYLATE 5 MG TA 5 Tablet] 90 tablet 1     Sig: TAKE 1 TABLET BY MOUTH DAILY.    atorvastatin (LIPITOR) 10 MG tablet [Pharmacy Med Name: ATORVASTATIN 10 MG TABLET 10 Tablet] 90 tablet 1     Sig: TAKE 1 TABLET BY MOUTH DAILY.      Last office visit with prescribing clinician: 12/12/2024   Last telemedicine visit with prescribing clinician: Visit date not found   Next office visit with prescribing clinician: 4/22/2025                         Would you like a call back once the refill request has been completed: [] Yes [] No    If the office needs to give you a call back, can they leave a voicemail: [] Yes [] No    Ora Henriquez MA  04/21/25, 13:50 EDT

## 2025-04-22 ENCOUNTER — OFFICE VISIT (OUTPATIENT)
Dept: ENDOCRINOLOGY | Facility: CLINIC | Age: 80
End: 2025-04-22
Payer: MEDICARE

## 2025-04-22 VITALS
DIASTOLIC BLOOD PRESSURE: 60 MMHG | WEIGHT: 169.6 LBS | HEART RATE: 77 BPM | BODY MASS INDEX: 28.22 KG/M2 | SYSTOLIC BLOOD PRESSURE: 144 MMHG

## 2025-04-22 DIAGNOSIS — E10.42 TYPE 1 DIABETES MELLITUS WITH DIABETIC POLYNEUROPATHY: Primary | ICD-10-CM

## 2025-04-22 DIAGNOSIS — I10 BENIGN ESSENTIAL HYPERTENSION: ICD-10-CM

## 2025-04-22 DIAGNOSIS — E78.00 PURE HYPERCHOLESTEROLEMIA: ICD-10-CM

## 2025-04-22 LAB
EXPIRATION DATE: ABNORMAL
EXPIRATION DATE: NORMAL
GLUCOSE BLDC GLUCOMTR-MCNC: 116 MG/DL (ref 70–130)
HBA1C MFR BLD: 6.4 % (ref 4.5–5.7)
Lab: ABNORMAL
Lab: NORMAL

## 2025-04-22 PROCEDURE — 3044F HG A1C LEVEL LT 7.0%: CPT | Performed by: INTERNAL MEDICINE

## 2025-04-22 PROCEDURE — 3077F SYST BP >= 140 MM HG: CPT | Performed by: INTERNAL MEDICINE

## 2025-04-22 PROCEDURE — 95251 CONT GLUC MNTR ANALYSIS I&R: CPT | Performed by: INTERNAL MEDICINE

## 2025-04-22 PROCEDURE — 83036 HEMOGLOBIN GLYCOSYLATED A1C: CPT | Performed by: INTERNAL MEDICINE

## 2025-04-22 PROCEDURE — 3078F DIAST BP <80 MM HG: CPT | Performed by: INTERNAL MEDICINE

## 2025-04-22 PROCEDURE — 1160F RVW MEDS BY RX/DR IN RCRD: CPT | Performed by: INTERNAL MEDICINE

## 2025-04-22 PROCEDURE — 99214 OFFICE O/P EST MOD 30 MIN: CPT | Performed by: INTERNAL MEDICINE

## 2025-04-22 PROCEDURE — 1159F MED LIST DOCD IN RCRD: CPT | Performed by: INTERNAL MEDICINE

## 2025-04-22 NOTE — ASSESSMENT & PLAN NOTE
Bilateral hammertoes, callus left foot   New swan neck deformity left hand   Average sugar is stable  Results for orders placed or performed in visit on 04/22/25   POC Glucose, Blood    Collection Time: 04/22/25  1:49 PM    Specimen: Blood   Result Value Ref Range    Glucose 116 70 - 130 mg/dL    Lot Number [id number]     Expiration Date 9,132,025    POC Glycosylated Hemoglobin (Hb A1C)    Collection Time: 04/22/25  1:49 PM    Specimen: Blood   Result Value Ref Range    Hemoglobin A1C 6.4 (A) 4.5 - 5.7 %    Lot Number [id number]     Expiration Date 01/02/2027      Is utd with eye exam  No foot ulcer- callus care reviewed  Ur alb due NOV   F/u 3-4 months

## 2025-04-22 NOTE — PROGRESS NOTES
Chinyere Olvera 80 y.o.  CC: follow up IDDM, Hyperlipidemia, Hypertension    Hoh: follow up IDDM, Hyperlipidemia, Hypertension    Blood sugar and 90 day average sugar reviewed  Results for orders placed or performed in visit on 04/22/25   POC Glucose, Blood    Collection Time: 04/22/25  1:49 PM    Specimen: Blood   Result Value Ref Range    Glucose 116 70 - 130 mg/dL    Lot Number [id number]     Expiration Date 9,132,025    POC Glycosylated Hemoglobin (Hb A1C)    Collection Time: 04/22/25  1:49 PM    Specimen: Blood   Result Value Ref Range    Hemoglobin A1C 6.4 (A) 4.5 - 5.7 %    Lot Number [id number]     Expiration Date 01/02/2027      Average sugar is stable  BP is up slightly today - recheck 138/74  Downloaded sensor and reviewed 14 days of sensor data- higher pp sugars from 1 pm on   Better overnight   Discussed correction for persistent high sugar   Allergies   Allergen Reactions    Polymyxin B Other (See Comments)    Polymyxin B-Trimethoprim Other (See Comments)    Penicillins Rash       Current Outpatient Medications:     ACCU-CHEK GUIDE test strip, Test blood sugars QID, Disp: 400 each, Rfl: 3    amLODIPine (NORVASC) 5 MG tablet, TAKE 1 TABLET BY MOUTH DAILY., Disp: 90 tablet, Rfl: 1    aspirin 81 MG EC tablet, Take 1 tablet by mouth Daily., Disp: , Rfl:     atorvastatin (LIPITOR) 10 MG tablet, TAKE 1 TABLET BY MOUTH DAILY., Disp: 90 tablet, Rfl: 1    Blood Glucose Monitoring Suppl (ACCU-CHEK GUIDE) w/Device kit, 1 Device Daily., Disp: 1 kit, Rfl: 0    buPROPion XL (WELLBUTRIN XL) 150 MG 24 hr tablet, , Disp: , Rfl:     Calcium Citrate-Vitamin D 250-200 MG-UNIT tablet, , Disp: , Rfl:     Continuous Blood Gluc Sensor (Dexcom G7 Sensor) misc, Use 1 Units Every 10 (Ten) Days., Disp: 9 each, Rfl: 1    estradiol (ESTRACE) 0.1 MG/GM vaginal cream, USE ONE GRAM IN THE VAGINA 3 TIMES WEEKLY, Disp: , Rfl:     Ferrous Fumarate (IRON) 18 MG tablet controlled-release, Take  by mouth Every Other Day., Disp: , Rfl:      "glucose blood (ONE TOUCH ULTRA TEST) test strip, Test blood sugars QID, Disp: 400 each, Rfl: 3    Insulin Glargine (Lantus SoloStar) 100 UNIT/ML injection pen, Inject 30 Units under the skin into the appropriate area as directed Every Night., Disp: 30 mL, Rfl: 0    Insulin Lispro, 1 Unit Dial, (HumaLOG KwikPen) 100 UNIT/ML solution pen-injector, INJECT 10 UNITS THREE TIMES A DAY BEFORE MEALS - REPLACES NOVOLOG PER INSURANCE/DOCTOR, Disp: 15 mL, Rfl: 2    Insulin Pen Needle (B-D ULTRAFINE III SHORT PEN) 31G X 8 MM misc, USE FOUR TIMES DAILY TO INJECT INSULIN, Disp: 400 each, Rfl: 1    Insulin Syringe-Needle U-100 31G X 5/16\" 0.5 ML misc, , Disp: , Rfl:     lansoprazole (PREVACID) 30 MG capsule, Take  by mouth., Disp: , Rfl:     losartan-hydrochlorothiazide (HYZAAR) 100-12.5 MG per tablet, TAKE 1 TABLET BY MOUTH DAILY., Disp: 90 tablet, Rfl: 1    metoprolol tartrate (LOPRESSOR) 25 MG tablet, TAKE 1/2 TABLET BY MOUTH TWICE DAILY -TAKE WITH FOOD, Disp: 90 tablet, Rfl: 1    Multiple Vitamins-Minerals (MULTIVITAMIN ADULTS 50+) tablet, , Disp: , Rfl:     Omega-3 Fatty Acids (FISH OIL) 1000 MG capsule capsule, Take  by mouth daily., Disp: , Rfl:     PRODIGY TWIST TOP LANCETS 28G misc, Use 4 per day to test blood sugars, Disp: 400 each, Rfl: 3    zinc gluconate 50 MG tablet, zinc gluconate 50 mg tablet  Daily, Disp: , Rfl:     Patient Active Problem List    Diagnosis     Left leg swelling [M79.89]     Stage 3a chronic kidney disease [N18.31]     Malaise and fatigue [R53.81, R53.83]     Pelvic organ prolapse quantification stage 4 cystocele [N81.10]     Lumbar radiculopathy [M54.16]     Chronic constipation [K59.09]     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]     Low back pain [M54.50]     Recurrent major depression [F33.9]     Severe nonproliferative diabetic retinopathy [E11.3499]     Mixed anxiety and depressive disorder [F41.8]     Abdominal bloating [R14.0]     Preoperative evaluation to rule out " surgical contraindication [Z01.818]     Obstructive sleep apnea syndrome [G47.33]     Peripheral neuropathy, idiopathic [G60.9]     Stress fracture of foot [M84.376A]     Foot pain [M79.673]     Pain in left foot [M79.672]     Moderate nonproliferative diabetic retinopathy associated with type 2 diabetes mellitus [E11.3399]     Abnormal liver function tests [R79.89]     Anemia [D64.9]     Benign essential hypertension [I10]     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]     Gastroesophageal reflux disease [K21.9]     Hyperlipidemia [E78.5]     Hypokalemia [E87.6]     Calculus of kidney [N20.0]     Cyst of ovary [N83.209]     Renal insufficiency [N28.9]     Solitary pulmonary nodule [R91.1]     Vitamin D deficiency [E55.9]     Abnormal weight loss [R63.4]      Review of Systems   Constitutional:  Negative for activity change, appetite change and unexpected weight change.   HENT:  Negative for congestion and rhinorrhea.    Eyes:  Negative for visual disturbance.   Respiratory:  Negative for cough and shortness of breath.    Cardiovascular:  Negative for palpitations and leg swelling.   Gastrointestinal:  Negative for constipation, diarrhea and nausea.   Genitourinary:  Negative for hematuria.   Musculoskeletal:  Negative for arthralgias, back pain, gait problem, joint swelling and myalgias.   Skin:  Negative for color change, rash and wound.   Allergic/Immunologic: Negative for environmental allergies, food allergies and immunocompromised state.   Neurological:  Negative for dizziness, weakness and light-headedness.   Psychiatric/Behavioral:  Negative for confusion, decreased concentration, dysphoric mood and sleep disturbance. The patient is not nervous/anxious.      Social History     Socioeconomic History    Marital status:    Tobacco Use    Smoking status: Former     Passive exposure: Past    Smokeless tobacco: Never   Vaping Use    Vaping status: Never Used   Substance and Sexual  Activity    Alcohol use: Yes     Alcohol/week: 3.0 standard drinks of alcohol     Types: 3 Glasses of wine per week     Comment: social    Drug use: No    Sexual activity: Defer     Family History   Problem Relation Age of Onset    Diabetes Father     Anemia Maternal Aunt     Diabetes Maternal Grandfather     Coronary artery disease Other     Breast cancer Neg Hx     Ovarian cancer Neg Hx      /60   Pulse 77   Wt 76.9 kg (169 lb 9.6 oz)   BMI 28.22 kg/m²   Physical Exam  Vitals and nursing note reviewed.   Constitutional:       Appearance: Normal appearance. She is well-developed.   HENT:      Head: Normocephalic and atraumatic.   Eyes:      General: Lids are normal.      Extraocular Movements: Extraocular movements intact.      Conjunctiva/sclera: Conjunctivae normal.      Pupils: Pupils are equal, round, and reactive to light.   Neck:      Thyroid: No thyroid mass or thyromegaly.      Vascular: No carotid bruit.      Trachea: Trachea normal. No tracheal deviation.   Cardiovascular:      Rate and Rhythm: Normal rate and regular rhythm.      Pulses: Normal pulses.      Heart sounds: Normal heart sounds. No murmur heard.     No friction rub. No gallop.   Pulmonary:      Effort: Pulmonary effort is normal. No respiratory distress.      Breath sounds: Normal breath sounds. No wheezing.   Musculoskeletal:         General: No deformity. Normal range of motion.      Cervical back: Normal range of motion and neck supple.   Lymphadenopathy:      Cervical: No cervical adenopathy.   Skin:     General: Skin is warm and dry.      Findings: No erythema or rash.      Nails: There is no clubbing.   Neurological:      General: No focal deficit present.      Mental Status: She is alert and oriented to person, place, and time.      Cranial Nerves: No cranial nerve deficit.      Deep Tendon Reflexes: Reflexes are normal and symmetric. Reflexes normal.   Psychiatric:         Mood and Affect: Mood normal.         Speech: Speech  normal.         Behavior: Behavior normal.         Thought Content: Thought content normal.         Judgment: Judgment normal.       Results for orders placed or performed in visit on 04/22/25   POC Glucose, Blood    Collection Time: 04/22/25  1:49 PM    Specimen: Blood   Result Value Ref Range    Glucose 116 70 - 130 mg/dL    Lot Number [id number]     Expiration Date 9,132,025    POC Glycosylated Hemoglobin (Hb A1C)    Collection Time: 04/22/25  1:49 PM    Specimen: Blood   Result Value Ref Range    Hemoglobin A1C 6.4 (A) 4.5 - 5.7 %    Lot Number [id number]     Expiration Date 01/02/2027      Diagnoses and all orders for this visit:    1. 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 (Primary)  Assessment & Plan:  Bilateral hammertoes, callus left foot   New swan neck deformity left hand   Average sugar is stable  Results for orders placed or performed in visit on 04/22/25   POC Glucose, Blood    Collection Time: 04/22/25  1:49 PM    Specimen: Blood   Result Value Ref Range    Glucose 116 70 - 130 mg/dL    Lot Number [id number]     Expiration Date 9,132,025    POC Glycosylated Hemoglobin (Hb A1C)    Collection Time: 04/22/25  1:49 PM    Specimen: Blood   Result Value Ref Range    Hemoglobin A1C 6.4 (A) 4.5 - 5.7 %    Lot Number [id number]     Expiration Date 01/02/2027      Is utd with eye exam  No foot ulcer- callus care reviewed  Ur alb due NOV   F/u 3-4 months     Orders:  -     POC Glucose, Blood  -     POC Glycosylated Hemoglobin (Hb A1C)    2. Benign essential hypertension  Assessment & Plan:  Recheck bp is good- continue monitoring, norvasc, losartan and metoprolol      3. Pure hypercholesterolemia  Assessment & Plan:  Taking lipitor 10 mg daily   Continue low fat diet   Update flp nov       Return in about 4 months (around 8/22/2025) for Recheck.    Electronically signed by: Maria Eugenia Lawrence MD

## 2025-07-02 NOTE — TELEPHONE ENCOUNTER
Rx Refill Note  Requested Prescriptions     Pending Prescriptions Disp Refills    Insulin Lispro, 1 Unit Dial, (HumaLOG KwikPen) 100 UNIT/ML solution pen-injector [Pharmacy Med Name: HUMALOG KWIK  Solution Pen-injector] 15 mL 2     Sig: INJECT 10 UNITS THREE TIMES A DAY BEFORE MEALS - REPLACES NOVOLOG PER INSURANCE/DOCTOR      Last office visit with prescribing clinician: 4/22/2025      Next office visit with prescribing clinician: 8/28/2025   {  Sylvia Patterson MA  07/02/25, 08:47 EDT

## 2025-07-03 RX ORDER — INSULIN LISPRO 100 [IU]/ML
INJECTION, SOLUTION INTRAVENOUS; SUBCUTANEOUS
Qty: 15 ML | Refills: 2 | Status: SHIPPED | OUTPATIENT
Start: 2025-07-03

## 2025-07-08 RX ORDER — INSULIN GLARGINE 100 [IU]/ML
30 INJECTION, SOLUTION SUBCUTANEOUS NIGHTLY
Qty: 30 ML | Refills: 0 | Status: SHIPPED | OUTPATIENT
Start: 2025-07-08

## 2025-07-08 NOTE — TELEPHONE ENCOUNTER
Rx Refill Note  Requested Prescriptions     Pending Prescriptions Disp Refills    Lantus SoloStar 100 UNIT/ML injection pen [Pharmacy Med Name: LANTUS SOLOSTAR U-100  Solution Pen-injector] 30 mL 0     Sig: INJECT 30 UNITS UNDER THE SKIN INTO THE APPROPRIATE AREA AS DIRECTED EVERY NIGHT.      Last office visit with prescribing clinician: 4/22/2025     Next office visit with prescribing clinician: 08/28/2025       Digna Lee  07/08/25, 15:18 EDT

## 2025-07-17 ENCOUNTER — TELEPHONE (OUTPATIENT)
Dept: ENDOCRINOLOGY | Facility: CLINIC | Age: 80
End: 2025-07-17
Payer: MEDICARE

## 2025-07-17 NOTE — TELEPHONE ENCOUNTER
[facility] from  [facility] called regarding a fax that was sent on 7/14 for patients CGM supplies. Stated that patient is almost out and they are wanting to know if order can be expedited. Stated I did not see where we received fax. They are going to refax order.

## 2025-08-01 RX ORDER — AMLODIPINE BESYLATE 5 MG/1
5 TABLET ORAL DAILY
Qty: 90 TABLET | Refills: 1 | OUTPATIENT
Start: 2025-08-01

## 2025-08-01 RX ORDER — LOSARTAN POTASSIUM AND HYDROCHLOROTHIAZIDE 12.5; 1 MG/1; MG/1
1 TABLET ORAL DAILY
Qty: 90 TABLET | Refills: 1 | OUTPATIENT
Start: 2025-08-01

## 2025-08-01 RX ORDER — METOPROLOL TARTRATE 25 MG/1
TABLET, FILM COATED ORAL
Qty: 90 TABLET | Refills: 1 | OUTPATIENT
Start: 2025-08-01

## 2025-08-01 RX ORDER — ATORVASTATIN CALCIUM 10 MG/1
10 TABLET, FILM COATED ORAL DAILY
Qty: 90 TABLET | Refills: 1 | OUTPATIENT
Start: 2025-08-01

## 2025-08-01 NOTE — TELEPHONE ENCOUNTER
Patient has appt scheduled for 08/08/25.  All requested prescriptions were sent on 04/01/25 with one refill.  She should be sustained until appt.

## 2025-08-28 ENCOUNTER — OFFICE VISIT (OUTPATIENT)
Dept: ENDOCRINOLOGY | Facility: CLINIC | Age: 80
End: 2025-08-28
Payer: MEDICARE

## 2025-08-28 VITALS
WEIGHT: 173 LBS | HEART RATE: 78 BPM | HEIGHT: 65 IN | OXYGEN SATURATION: 95 % | BODY MASS INDEX: 28.82 KG/M2 | SYSTOLIC BLOOD PRESSURE: 142 MMHG | DIASTOLIC BLOOD PRESSURE: 68 MMHG

## 2025-08-28 DIAGNOSIS — E55.9 VITAMIN D DEFICIENCY: ICD-10-CM

## 2025-08-28 DIAGNOSIS — E10.42 TYPE 1 DIABETES MELLITUS WITH DIABETIC POLYNEUROPATHY: Primary | ICD-10-CM

## 2025-08-28 DIAGNOSIS — E78.00 PURE HYPERCHOLESTEROLEMIA: ICD-10-CM

## 2025-08-28 DIAGNOSIS — I10 BENIGN ESSENTIAL HYPERTENSION: ICD-10-CM

## 2025-08-28 LAB
EXPIRATION DATE: ABNORMAL
EXPIRATION DATE: NORMAL
GLUCOSE BLDC GLUCOMTR-MCNC: 128 MG/DL (ref 70–130)
HBA1C MFR BLD: 6.5 % (ref 4.5–5.7)
Lab: ABNORMAL
Lab: NORMAL

## 2025-08-28 PROCEDURE — 80061 LIPID PANEL: CPT | Performed by: INTERNAL MEDICINE

## 2025-08-28 PROCEDURE — 80053 COMPREHEN METABOLIC PANEL: CPT | Performed by: INTERNAL MEDICINE

## 2025-08-28 PROCEDURE — 82043 UR ALBUMIN QUANTITATIVE: CPT | Performed by: INTERNAL MEDICINE

## 2025-08-28 PROCEDURE — 82306 VITAMIN D 25 HYDROXY: CPT | Performed by: INTERNAL MEDICINE

## 2025-08-28 PROCEDURE — 82570 ASSAY OF URINE CREATININE: CPT | Performed by: INTERNAL MEDICINE

## 2025-08-28 PROCEDURE — 84443 ASSAY THYROID STIM HORMONE: CPT | Performed by: INTERNAL MEDICINE

## 2025-08-28 PROCEDURE — 84439 ASSAY OF FREE THYROXINE: CPT | Performed by: INTERNAL MEDICINE

## 2025-08-29 LAB
25(OH)D3 SERPL-MCNC: 46.4 NG/ML (ref 30–100)
ALBUMIN SERPL-MCNC: 4.6 G/DL (ref 3.5–5.2)
ALBUMIN UR-MCNC: 4.7 MG/DL
ALBUMIN/GLOB SERPL: 1.6 G/DL
ALP SERPL-CCNC: 83 U/L (ref 39–117)
ALT SERPL W P-5'-P-CCNC: 21 U/L (ref 1–33)
ANION GAP SERPL CALCULATED.3IONS-SCNC: 13.8 MMOL/L (ref 5–15)
AST SERPL-CCNC: 23 U/L (ref 1–32)
BILIRUB SERPL-MCNC: 0.4 MG/DL (ref 0–1.2)
BUN SERPL-MCNC: 23 MG/DL (ref 8–23)
BUN/CREAT SERPL: 22.3 (ref 7–25)
CALCIUM SPEC-SCNC: 10.1 MG/DL (ref 8.6–10.5)
CHLORIDE SERPL-SCNC: 102 MMOL/L (ref 98–107)
CHOLEST SERPL-MCNC: 152 MG/DL (ref 0–200)
CO2 SERPL-SCNC: 24.2 MMOL/L (ref 22–29)
CREAT SERPL-MCNC: 1.03 MG/DL (ref 0.57–1)
CREAT UR-MCNC: 56 MG/DL
EGFRCR SERPLBLD CKD-EPI 2021: 55.1 ML/MIN/1.73
GLOBULIN UR ELPH-MCNC: 2.8 GM/DL
GLUCOSE SERPL-MCNC: 95 MG/DL (ref 65–99)
HDLC SERPL-MCNC: 63 MG/DL (ref 40–60)
LDLC SERPL CALC-MCNC: 71 MG/DL (ref 0–100)
LDLC/HDLC SERPL: 1.1 {RATIO}
MICROALBUMIN/CREAT UR: 83.9 MG/G (ref 0–29)
POTASSIUM SERPL-SCNC: 4.1 MMOL/L (ref 3.5–5.2)
PROT SERPL-MCNC: 7.4 G/DL (ref 6–8.5)
SODIUM SERPL-SCNC: 140 MMOL/L (ref 136–145)
T4 FREE SERPL-MCNC: 1.21 NG/DL (ref 0.92–1.68)
TRIGL SERPL-MCNC: 100 MG/DL (ref 0–150)
TSH SERPL DL<=0.05 MIU/L-ACNC: 1.74 UIU/ML (ref 0.27–4.2)
VLDLC SERPL-MCNC: 18 MG/DL (ref 5–40)